# Patient Record
Sex: MALE | Race: WHITE | HISPANIC OR LATINO | Employment: OTHER | URBAN - METROPOLITAN AREA
[De-identification: names, ages, dates, MRNs, and addresses within clinical notes are randomized per-mention and may not be internally consistent; named-entity substitution may affect disease eponyms.]

---

## 2017-02-08 ENCOUNTER — OFFICE VISIT (OUTPATIENT)
Dept: FAMILY MEDICINE | Facility: CLINIC | Age: 77
End: 2017-02-08
Payer: MEDICARE

## 2017-02-08 ENCOUNTER — HOSPITAL ENCOUNTER (OUTPATIENT)
Dept: RADIOLOGY | Facility: HOSPITAL | Age: 77
Discharge: HOME | End: 2017-02-08
Attending: FAMILY MEDICINE
Payer: MEDICARE

## 2017-02-08 VITALS
WEIGHT: 205.69 LBS | OXYGEN SATURATION: 97 % | TEMPERATURE: 99 F | SYSTOLIC BLOOD PRESSURE: 120 MMHG | DIASTOLIC BLOOD PRESSURE: 78 MMHG | HEIGHT: 72 IN | HEART RATE: 78 BPM | BODY MASS INDEX: 27.86 KG/M2

## 2017-02-08 DIAGNOSIS — L97.912 LEG ULCER, RIGHT, WITH FAT LAYER EXPOSED: ICD-10-CM

## 2017-02-08 DIAGNOSIS — I87.8 VENOUS STASIS: ICD-10-CM

## 2017-02-08 DIAGNOSIS — I87.8 VENOUS STASIS: Primary | ICD-10-CM

## 2017-02-08 DIAGNOSIS — H40.9 GLAUCOMA, UNSPECIFIED GLAUCOMA, UNSPECIFIED LATERALITY: ICD-10-CM

## 2017-02-08 DIAGNOSIS — I10 ESSENTIAL HYPERTENSION: ICD-10-CM

## 2017-02-08 DIAGNOSIS — E78.5 HYPERLIPIDEMIA, UNSPECIFIED HYPERLIPIDEMIA TYPE: ICD-10-CM

## 2017-02-08 PROCEDURE — 93971 EXTREMITY STUDY: CPT | Mod: TC,PO

## 2017-02-08 PROCEDURE — 1157F ADVNC CARE PLAN IN RCRD: CPT | Mod: S$GLB,,, | Performed by: FAMILY MEDICINE

## 2017-02-08 PROCEDURE — 1125F AMNT PAIN NOTED PAIN PRSNT: CPT | Mod: S$GLB,,, | Performed by: FAMILY MEDICINE

## 2017-02-08 PROCEDURE — 1159F MED LIST DOCD IN RCRD: CPT | Mod: S$GLB,,, | Performed by: FAMILY MEDICINE

## 2017-02-08 PROCEDURE — 93926 LOWER EXTREMITY STUDY: CPT | Mod: TC,PO

## 2017-02-08 PROCEDURE — 99214 OFFICE O/P EST MOD 30 MIN: CPT | Mod: S$GLB,,, | Performed by: FAMILY MEDICINE

## 2017-02-08 PROCEDURE — 99499 UNLISTED E&M SERVICE: CPT | Mod: S$GLB,,, | Performed by: FAMILY MEDICINE

## 2017-02-08 PROCEDURE — 1160F RVW MEDS BY RX/DR IN RCRD: CPT | Mod: S$GLB,,, | Performed by: FAMILY MEDICINE

## 2017-02-08 PROCEDURE — 3074F SYST BP LT 130 MM HG: CPT | Mod: S$GLB,,, | Performed by: FAMILY MEDICINE

## 2017-02-08 PROCEDURE — 3078F DIAST BP <80 MM HG: CPT | Mod: S$GLB,,, | Performed by: FAMILY MEDICINE

## 2017-02-08 RX ORDER — SULFAMETHOXAZOLE AND TRIMETHOPRIM 800; 160 MG/1; MG/1
1 TABLET ORAL 2 TIMES DAILY
Qty: 20 TABLET | Refills: 0 | Status: SHIPPED | OUTPATIENT
Start: 2017-02-08 | End: 2017-02-18

## 2017-02-08 RX ORDER — MUPIROCIN 20 MG/G
OINTMENT TOPICAL DAILY
Qty: 30 G | Refills: 1 | Status: SHIPPED | OUTPATIENT
Start: 2017-02-08 | End: 2018-10-15 | Stop reason: SDUPTHER

## 2017-02-08 RX ORDER — FUROSEMIDE 40 MG/1
40 TABLET ORAL DAILY
Qty: 30 TABLET | Refills: 0 | Status: SHIPPED | OUTPATIENT
Start: 2017-02-08 | End: 2017-03-14 | Stop reason: ALTCHOICE

## 2017-02-08 NOTE — MR AVS SNAPSHOT
[place] [address] [place] [address]  Phone: [phone]  Fax: [phone]                  Osorio Boggs   2017 11:00 AM   Office Visit    Description:  Male : 1940   Provider:  Keaton Adan MD   Department:  [facility]           Reason for Visit     Follow-up     Wound Check           Diagnoses this Visit        Comments    Venous stasis    -  Primary     Leg ulcer, right, with fat layer exposed         Glaucoma, unspecified glaucoma, unspecified laterality         Essential hypertension         Hyperlipidemia, unspecified hyperlipidemia type                To Do List           Goals (5 Years of Data)     None      Follow-Up and Disposition     Return in about 1 week (around 2/15/2017).       These Medications        Disp Refills Start End    sulfamethoxazole-trimethoprim 800-160mg (BACTRIM DS) 800-160 mg Tab 20 tablet 0 2017    Take 1 tablet by mouth 2 (two) times daily. - Oral    Pharmacy: [facility] [address] [place] [address] Ph #: [phone]       furosemide (LASIX) 40 MG tablet 30 tablet 0 2017    Take 1 tablet (40 mg total) by mouth once daily. - Oral    Pharmacy: [address] Ph #: [phone]       mupirocin (BACTROBAN) 2 % ointment 30 g 1 2017    Apply topically once daily. - Topical (Top)    Pharmacy: [address] Ph #: [phone]         [facility]     [facility] On Call Nurse Care Line -  Assistance  Registered nurses in the [facility] On Call Center provide clinical advisement, health education, appointment booking, and other advisory services.  Call for this free service at [phone].             Medications           Message regarding Medications     Verify the changes and/or additions to your medication regime listed below are the same as discussed with your clinician  today.  If any of these changes or additions are incorrect, please notify your healthcare provider.        START taking these NEW medications        Refills    sulfamethoxazole-trimethoprim 800-160mg (BACTRIM DS) 800-160 mg Tab 0    Sig: Take 1 tablet by mouth 2 (two) times daily.    Class: Normal    Route: Oral    furosemide (LASIX) 40 MG tablet 0    Sig: Take 1 tablet (40 mg total) by mouth once daily.    Class: Normal    Route: Oral    mupirocin (BACTROBAN) 2 % ointment 1    Sig: Apply topically once daily.    Class: Normal    Route: Topical (Top)      STOP taking these medications     cyanocobalamin, vitamin B-12, (VITAMIN B-12) 1,000 mcg/mL Drop Take 1 mL by mouth once daily.           Verify that the below list of medications is an accurate representation of the medications you are currently taking.  If none reported, the list may be blank. If incorrect, please contact your healthcare provider. Carry this list with you in case of emergency.           Current Medications     abacavir-lamivudine (EPZICOM) 600-300 mg per tablet Take 1 tablet by mouth once daily.    aspirin (ECOTRIN) 81 MG EC tablet Take 81 mg by mouth once daily.    brimonidine 0.1% (ALPHAGAN P) 0.1 % Drop Place 1 drop into both eyes 3 (three) times daily.    CHOLECALCIFEROL, VITAMIN D3, (VITAMIN D3 ORAL) Take 1 tablet by mouth once daily.     efavirenz (SUSTIVA) 600 mg Tab Take 600 mg by mouth every evening.    EVOTAZ 300-150 mg Tab Take 1 tablet by mouth once daily.    ezetimibe (ZETIA) 10 mg tablet Take 1 tablet (10 mg total) by mouth once daily. For cholesterol    fish oil-omega-3 fatty acids 300-1,000 mg capsule Take 2 g by mouth once daily.    latanoprost 0.005 % ophthalmic solution 1 drop every evening.    olmesartan (BENICAR) 40 MG tablet Take 40 mg by mouth once daily.    fenofibrate (TRICOR) 145 MG tablet Take 1 tablet by mouth once daily.    furosemide (LASIX) 40 MG tablet Take 1 tablet (40 mg total) by mouth once daily.    mupirocin  (BACTROBAN) 2 % ointment Apply topically once daily.    sulfamethoxazole-trimethoprim 800-160mg (BACTRIM DS) 800-160 mg Tab Take 1 tablet by mouth 2 (two) times daily.           Clinical Reference Information           Your Vitals Were     BP Pulse Temp Height Weight SpO2    120/78 78 98.5 °F (36.9 °C) (Oral) 6' (1.829 m) 93.3 kg (205 lb 11 oz) 97%    BMI                27.9 kg/m2          Blood Pressure          Most Recent Value    BP  120/78      Allergies as of 2/8/2017     Lipitor [Atorvastatin]      Immunizations Administered on Date of Encounter - 2/8/2017     None      Orders Placed During Today's Visit     Future Labs/Procedures Expected by Expires    US Lower Extremity Veins Right  2/8/2017 2/8/2018    VAS Ultrasound doppler arterial leg right  As directed 2/8/2018      [facility] Sign-Up     Activating your [facility] account is as easy as 1-2-3!     1) Visit [email or web address], select Sign Up Now, enter this activation code and your date of birth, then select Next.  [email or web address]  Expires: 3/25/2017 12:21 PM      2) Create a username and password to use when you visit [facility] in the future and select a security question in case you lose your password and select Next.    3) Enter your e-mail address and click Sign Up!    Additional Information  If you have questions, please e-mail [email or web address] or call [phone] to talk to our [facility] staff. Remember, [facility] is NOT to be used for urgent needs. For medical emergencies, dial 911.         Language Assistance Services     ATTENTION: Language assistance services are available, free of charge. Please call [phone].      ATENCIÓN: Si habla español, tiene a marcus disposición servicios gratuitos de asistencia lingüística. Llame al [phone].     CHÚ Ý: N?u b?n nói Ti?ng Vi?t, có các d?ch v? h? tr? ngôn ng? mi?n phí dành cho b?n. G?i s? [phone].         [facility] complies with applicable Federal civil rights laws and  does not discriminate on the basis of race, color, national origin, age, disability, or sex.
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
## 2017-02-09 ENCOUNTER — TELEPHONE (OUTPATIENT)
Dept: FAMILY MEDICINE | Facility: CLINIC | Age: 77
End: 2017-02-09

## 2017-02-09 DIAGNOSIS — E78.5 HYPERLIPIDEMIA, UNSPECIFIED HYPERLIPIDEMIA TYPE: ICD-10-CM

## 2017-02-09 DIAGNOSIS — H40.9 GLAUCOMA, UNSPECIFIED GLAUCOMA, UNSPECIFIED LATERALITY: ICD-10-CM

## 2017-02-09 DIAGNOSIS — I87.8 VENOUS STASIS: ICD-10-CM

## 2017-02-09 DIAGNOSIS — Z21 HIV POSITIVE: ICD-10-CM

## 2017-02-09 DIAGNOSIS — L97.912 ULCER OF RIGHT LOWER EXTREMITY WITH FAT LAYER EXPOSED: ICD-10-CM

## 2017-02-09 DIAGNOSIS — I10 ESSENTIAL HYPERTENSION: Primary | ICD-10-CM

## 2017-02-09 DIAGNOSIS — I73.9 PAD (PERIPHERAL ARTERY DISEASE): ICD-10-CM

## 2017-02-09 NOTE — TELEPHONE ENCOUNTER
Left detailed message for patient to return my call to discuss ultrasound results and schedule with cardiology.

## 2017-02-09 NOTE — TELEPHONE ENCOUNTER
----- Message from Keaton Adan MD sent at 2/9/2017  6:40 AM CST -----  Some blockage in right leg arteries.  Referring to cardiology, Live for further evaluation. Help pt get appt.

## 2017-02-15 ENCOUNTER — OFFICE VISIT (OUTPATIENT)
Dept: FAMILY MEDICINE | Facility: CLINIC | Age: 77
End: 2017-02-15
Payer: MEDICARE

## 2017-02-15 VITALS
WEIGHT: 202.19 LBS | TEMPERATURE: 98 F | HEIGHT: 72 IN | HEART RATE: 83 BPM | OXYGEN SATURATION: 98 % | SYSTOLIC BLOOD PRESSURE: 126 MMHG | BODY MASS INDEX: 27.39 KG/M2 | DIASTOLIC BLOOD PRESSURE: 84 MMHG

## 2017-02-15 DIAGNOSIS — I10 ESSENTIAL HYPERTENSION: ICD-10-CM

## 2017-02-15 DIAGNOSIS — H40.9 GLAUCOMA, UNSPECIFIED GLAUCOMA, UNSPECIFIED LATERALITY: ICD-10-CM

## 2017-02-15 DIAGNOSIS — I73.9 PAD (PERIPHERAL ARTERY DISEASE): ICD-10-CM

## 2017-02-15 DIAGNOSIS — E78.5 HYPERLIPIDEMIA, UNSPECIFIED HYPERLIPIDEMIA TYPE: ICD-10-CM

## 2017-02-15 DIAGNOSIS — Z21 HIV POSITIVE: ICD-10-CM

## 2017-02-15 DIAGNOSIS — L97.912 ULCER OF RIGHT LOWER EXTREMITY WITH FAT LAYER EXPOSED: Primary | ICD-10-CM

## 2017-02-15 DIAGNOSIS — I87.8 VENOUS STASIS: ICD-10-CM

## 2017-02-15 PROCEDURE — 1159F MED LIST DOCD IN RCRD: CPT | Mod: S$GLB,,, | Performed by: FAMILY MEDICINE

## 2017-02-15 PROCEDURE — 1126F AMNT PAIN NOTED NONE PRSNT: CPT | Mod: S$GLB,,, | Performed by: FAMILY MEDICINE

## 2017-02-15 PROCEDURE — 3079F DIAST BP 80-89 MM HG: CPT | Mod: S$GLB,,, | Performed by: FAMILY MEDICINE

## 2017-02-15 PROCEDURE — 1160F RVW MEDS BY RX/DR IN RCRD: CPT | Mod: S$GLB,,, | Performed by: FAMILY MEDICINE

## 2017-02-15 PROCEDURE — 1157F ADVNC CARE PLAN IN RCRD: CPT | Mod: S$GLB,,, | Performed by: FAMILY MEDICINE

## 2017-02-15 PROCEDURE — 99499 UNLISTED E&M SERVICE: CPT | Mod: S$GLB,,, | Performed by: FAMILY MEDICINE

## 2017-02-15 PROCEDURE — 99213 OFFICE O/P EST LOW 20 MIN: CPT | Mod: S$GLB,,, | Performed by: FAMILY MEDICINE

## 2017-02-15 PROCEDURE — 3074F SYST BP LT 130 MM HG: CPT | Mod: S$GLB,,, | Performed by: FAMILY MEDICINE

## 2017-02-15 NOTE — PROGRESS NOTES
Subjective:      Patient ID: Osorio Boggs is a 76 y.o. male.    Chief Complaint: Follow-up (1 week f/u)    HPI Comments: Right leg infection follow up; no pain; doing better; less swelling; taking rx;     Review of Systems   Constitutional: Negative for appetite change, fatigue, fever and unexpected weight change.   HENT: Negative for congestion, ear pain, sinus pressure and sore throat.    Eyes: Negative for pain and visual disturbance.   Respiratory: Negative for shortness of breath.    Cardiovascular: Negative for chest pain.   Gastrointestinal: Negative for abdominal pain, constipation and diarrhea.   Endocrine: Negative for polyuria.   Genitourinary: Negative for difficulty urinating and frequency.   Musculoskeletal: Negative for arthralgias, back pain and myalgias.   Skin: Positive for wound. Negative for color change.   Allergic/Immunologic: Negative.    Neurological: Negative for syncope, weakness and headaches.   Hematological: Does not bruise/bleed easily.   Psychiatric/Behavioral: Negative for dysphoric mood and suicidal ideas. The patient is not nervous/anxious.    All other systems reviewed and are negative.    Objective:     Physical Exam   Constitutional: He is oriented to person, place, and time. He appears well-developed and well-nourished. No distress.   HENT:   Head: Normocephalic and atraumatic.   Right Ear: External ear normal.   Left Ear: External ear normal.   Mouth/Throat: Oropharynx is clear and moist. No oropharyngeal exudate.   Eyes: Conjunctivae and EOM are normal. Pupils are equal, round, and reactive to light. Right eye exhibits no discharge. Left eye exhibits no discharge. No scleral icterus.   Neck: Normal range of motion. Neck supple. No JVD present. No tracheal deviation present. No thyromegaly present.   Cardiovascular: Normal rate and regular rhythm.  Exam reveals no gallop and no friction rub.    No murmur heard.  Pulmonary/Chest: Effort normal and breath sounds normal. No  stridor. No respiratory distress. He has no wheezes. He has no rales. He exhibits no tenderness.   Abdominal: Soft. He exhibits no distension and no mass. There is no tenderness. There is no rebound and no guarding.   Musculoskeletal: Normal range of motion. He exhibits no edema or tenderness.   Lymphadenopathy:     He has no cervical adenopathy.   Neurological: He is alert and oriented to person, place, and time. He has normal reflexes. He displays normal reflexes. No cranial nerve deficit. He exhibits normal muscle tone. Coordination normal.   Skin: Skin is warm and dry. No rash noted. He is not diaphoretic. No erythema. No pallor.   Right lower leg infected ulcer looking much better   Psychiatric: He has a normal mood and affect. His behavior is normal. Judgment and thought content normal.   Nursing note and vitals reviewed.    Assessment:     1. Ulcer of right lower extremity with fat layer exposed    2. Essential hypertension    3. PAD (peripheral artery disease)    4. Venous stasis    5. Hyperlipidemia, unspecified hyperlipidemia type    6. Glaucoma, unspecified glaucoma, unspecified laterality    7. HIV positive      Plan:     New Prescriptions    No medications on file     Discontinued Medications    No medications on file     Modified Medications    No medications on file       Ulcer of right lower extremity with fat layer exposed    Essential hypertension    PAD (peripheral artery disease)    Venous stasis    Hyperlipidemia, unspecified hyperlipidemia type    Glaucoma, unspecified glaucoma, unspecified laterality    HIV positive    Cont wound treatment the same; finish abx; referred to cardiology for pad found on ultrasound

## 2017-02-15 NOTE — MR AVS SNAPSHOT
[address] [address]  Phone: [phone]  Fax: [phone]                  Osorio Boggs   2/15/2017 11:20 AM   Office Visit    Description:  Male : 1940   Provider:  Keaton Adan MD   Department:  [facility]           Reason for Visit     Follow-up           Diagnoses this Visit        Comments    Ulcer of right lower extremity with fat layer exposed    -  Primary     Essential hypertension         PAD (peripheral artery disease)         Venous stasis         Hyperlipidemia, unspecified hyperlipidemia type         Glaucoma, unspecified glaucoma, unspecified laterality         HIV positive                To Do List           Future Appointments        Provider Department Dept Phone    2017 8:00 AM Gene Ramos MD [facility] Cardiology [phone]    3/1/2017 11:00 AM Keaton Adan MD [facility] [phone]      Goals (5 Years of Data)     None      Follow-Up and Disposition     Return in about 2 weeks (around 3/1/2017).      [facility] On Call     [facility] On Call Nurse [facility] Line - 24/7 Assistance  Registered nurses in the [facility] On Call Center provide clinical advisement, health education, appointment booking, and other advisory services.  Call for this free service at [phone].             Medications           Message regarding Medications     Verify the changes and/or additions to your medication regime listed below are the same as discussed with your clinician today.  If any of these changes or additions are incorrect, please notify your healthcare provider.             Verify that the below list of medications is an accurate representation of the medications you are currently taking.  If none reported, the list may be blank. If incorrect, please contact your healthcare provider. Carry this list with you in case of emergency.           Current Medications     abacavir-lamivudine (EPZICOM) 600-300 mg per  tablet Take 1 tablet by mouth once daily.    aspirin (ECOTRIN) 81 MG EC tablet Take 81 mg by mouth once daily.    brimonidine 0.1% (ALPHAGAN P) 0.1 % Drop Place 1 drop into both eyes 2 (two) times daily.     CHOLECALCIFEROL, VITAMIN D3, (VITAMIN D3 ORAL) Take 50,000 Units by mouth once a week.     efavirenz (SUSTIVA) 600 mg Tab Take 600 mg by mouth every evening.    EVOTAZ 300-150 mg Tab Take 1 tablet by mouth once daily.    ezetimibe (ZETIA) 10 mg tablet Take 1 tablet (10 mg total) by mouth once daily. For cholesterol    fenofibrate (TRICOR) 145 MG tablet Take 1 tablet by mouth once daily.    fish oil-omega-3 fatty acids 300-1,000 mg capsule Take 2 g by mouth 2 (two) times daily.     furosemide (LASIX) 40 MG tablet Take 1 tablet (40 mg total) by mouth once daily.    latanoprost 0.005 % ophthalmic solution 1 drop every evening.    mupirocin (BACTROBAN) 2 % ointment Apply topically once daily.    olmesartan (BENICAR) 40 MG tablet Take 40 mg by mouth once daily.    sulfamethoxazole-trimethoprim 800-160mg (BACTRIM DS) 800-160 mg Tab Take 1 tablet by mouth 2 (two) times daily.           Clinical Reference Information           Your Vitals Were     BP Pulse Temp Height Weight SpO2    126/84 83 98.4 °F (36.9 °C) (Oral) 6' (1.829 m) 91.7 kg (202 lb 2.6 oz) 98%    BMI                27.42 kg/m2          Blood Pressure          Most Recent Value    BP  126/84      Allergies as of 2/15/2017     Lipitor [Atorvastatin]      Immunizations Administered on Date of Encounter - 2/15/2017     None      [facility] Sign-Up     Activating your [facility] account is as easy as 1-2-3!     1) Visit [email or web address], select Sign Up Now, enter this activation code and your date of birth, then select Next.  [email or web address]  Expires: 3/25/2017 12:21 PM      2) Create a username and password to use when you visit [facility] in the future and select a security question in case you lose your password and select Next.    3) Enter your e-mail address  and click Sign Up!    Additional Information  If you have questions, please e-mail [email or web address] or call [phone] to talk to our [facility] staff. Remember, [facility] is NOT to be used for urgent needs. For medical emergencies, dial 911.         Language Assistance Services     ATTENTION: Language assistance services are available, free of charge. Please call [phone].      ATENCIÓN: Si habla yandel, tiene a marcus disposición servicios gratuitos de asistencia lingüística. Llame al [phone].     CHÚ Ý: N?u b?n nói Ti?ng Vi?t, có các d?ch v? h? tr? ngôn ng? mi?n phí dành cho b?n. G?i s? [phone].         [facility] complies with applicable Federal civil rights laws and does not discriminate on the basis of race, color, national origin, age, disability, or sex.

## 2017-02-21 ENCOUNTER — OFFICE VISIT (OUTPATIENT)
Dept: CARDIOLOGY | Facility: CLINIC | Age: 77
End: 2017-02-21
Payer: MEDICARE

## 2017-02-21 VITALS
HEIGHT: 72 IN | WEIGHT: 196 LBS | HEART RATE: 65 BPM | DIASTOLIC BLOOD PRESSURE: 57 MMHG | OXYGEN SATURATION: 99 % | SYSTOLIC BLOOD PRESSURE: 91 MMHG | BODY MASS INDEX: 26.55 KG/M2

## 2017-02-21 DIAGNOSIS — L97.912 ULCER OF RIGHT LOWER EXTREMITY WITH FAT LAYER EXPOSED: Primary | ICD-10-CM

## 2017-02-21 DIAGNOSIS — I87.8 VENOUS STASIS: ICD-10-CM

## 2017-02-21 DIAGNOSIS — I73.9 PAD (PERIPHERAL ARTERY DISEASE): ICD-10-CM

## 2017-02-21 PROCEDURE — 99999 PR PBB SHADOW E&M-EST. PATIENT-LVL III: CPT | Mod: PBBFAC,,, | Performed by: INTERNAL MEDICINE

## 2017-02-21 PROCEDURE — 1159F MED LIST DOCD IN RCRD: CPT | Mod: S$GLB,,, | Performed by: INTERNAL MEDICINE

## 2017-02-21 PROCEDURE — 3078F DIAST BP <80 MM HG: CPT | Mod: S$GLB,,, | Performed by: INTERNAL MEDICINE

## 2017-02-21 PROCEDURE — 99203 OFFICE O/P NEW LOW 30 MIN: CPT | Mod: S$GLB,,, | Performed by: INTERNAL MEDICINE

## 2017-02-21 PROCEDURE — 99499 UNLISTED E&M SERVICE: CPT | Mod: S$PBB,,, | Performed by: INTERNAL MEDICINE

## 2017-02-21 PROCEDURE — 1126F AMNT PAIN NOTED NONE PRSNT: CPT | Mod: S$GLB,,, | Performed by: INTERNAL MEDICINE

## 2017-02-21 PROCEDURE — 1157F ADVNC CARE PLAN IN RCRD: CPT | Mod: S$GLB,,, | Performed by: INTERNAL MEDICINE

## 2017-02-21 PROCEDURE — 3074F SYST BP LT 130 MM HG: CPT | Mod: S$GLB,,, | Performed by: INTERNAL MEDICINE

## 2017-02-21 NOTE — LETTER
February 21, 2017      Keaton Adan MD  [address] [facility] [address]           [facility] - Cardiology  [address], [address]  [facility] [address]  Phone: [phone]  Fax: [phone]          Patient: Osorio Boggs   MR Number: [id number]   YOB: 1940   Date of Visit: 2/21/2017       Dear Dr. Keaton Adan:    Thank you for referring Osorio Boggs to me for evaluation. Attached you will find relevant portions of my assessment and plan of care.    If you have questions, please do not hesitate to call me. I look forward to following Osorio Boggs along with you.    Sincerely,    Gene Ramos MD    Enclosure  CC:  No Recipients    If you would like to receive this communication electronically, please contact [email or web address] or [phone] to request more information on [employer] Link access.    For providers and/or their staff who would like to refer a patient to [facility], please contact us through our one-stop-shop provider referral line, [facility], at [phone].    If you feel you have received this communication in error or would no longer like to receive these types of communications, please e-mail [email or web address]
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
## 2017-02-22 ENCOUNTER — TELEPHONE (OUTPATIENT)
Dept: CARDIOLOGY | Facility: CLINIC | Age: 77
End: 2017-02-22

## 2017-02-22 NOTE — TELEPHONE ENCOUNTER
----- Message from Catherine Chang sent at 2/22/2017 11:34 AM CST -----  Contact: self/[phone]  He is calling to see if his fu appointments have been scheduled.

## 2017-02-22 NOTE — TELEPHONE ENCOUNTER
Evaluated on 2/21/17, recommendations to consult wound care and get ultrasound.  Informed patient I am still working on correlating both appointments as he requested.  Will call back towards the end of the business day.  Voiced understanding.

## 2017-02-23 NOTE — TELEPHONE ENCOUNTER
Spoke with patient on yesterday, appointments scheduled accordingly.  Patient asked if he should see Dr Adan now since he will be evaluated by wound care.  Spoke with Dr Adan, it was recommended to follow up with wound care and complete work up, then follow up with Dr Adan.  Patient informed, voiced understanding.

## 2017-03-02 ENCOUNTER — HOSPITAL ENCOUNTER (OUTPATIENT)
Dept: WOUND CARE | Facility: HOSPITAL | Age: 77
Discharge: HOME | End: 2017-03-02
Attending: INTERNAL MEDICINE
Payer: MEDICARE

## 2017-03-02 VITALS
SYSTOLIC BLOOD PRESSURE: 121 MMHG | HEIGHT: 72 IN | BODY MASS INDEX: 27.77 KG/M2 | TEMPERATURE: 98 F | WEIGHT: 205 LBS | HEART RATE: 56 BPM | DIASTOLIC BLOOD PRESSURE: 59 MMHG

## 2017-03-02 DIAGNOSIS — L97.912 ULCER OF RIGHT LOWER EXTREMITY WITH FAT LAYER EXPOSED: Primary | ICD-10-CM

## 2017-03-02 PROCEDURE — 99214 OFFICE O/P EST MOD 30 MIN: CPT

## 2017-03-02 NOTE — PROGRESS NOTES
Much of the documentation for this visit was completed in the [facility] system. Please see the attached documentation for further details about this patient's care.     Antonia Jackson RN

## 2017-03-02 NOTE — PROGRESS NOTES
Much of the documentation for this visit was completed in the [facility] system. Please see the attached documentation for further details about this patient's care.     Oz Rodriguez MD

## 2017-03-07 ENCOUNTER — TELEPHONE (OUTPATIENT)
Dept: FAMILY MEDICINE | Facility: CLINIC | Age: 77
End: 2017-03-07

## 2017-03-07 DIAGNOSIS — R79.89 AZOTEMIA: Primary | ICD-10-CM

## 2017-03-07 NOTE — TELEPHONE ENCOUNTER
---- Message from Lynn Cross sent at 3/3/2017  9:58 AM CST -----  Contact: Janet Robins-nurse practitioner ph# [phone]  She has been monitoring patient's HIV. Lab results show elevated kidney function.   Creatinine 2.25 & GFR 31;   Complete lab results faxed over ( scanned into media)      She has advised pt to hold his Lasix until he hears from Dr Adan.

## 2017-03-08 ENCOUNTER — HOSPITAL ENCOUNTER (OUTPATIENT)
Dept: WOUND CARE | Facility: HOSPITAL | Age: 77
Discharge: HOME | End: 2017-03-08
Attending: SURGERY
Payer: MEDICARE

## 2017-03-08 VITALS
BODY MASS INDEX: 27.77 KG/M2 | HEIGHT: 72 IN | SYSTOLIC BLOOD PRESSURE: 147 MMHG | TEMPERATURE: 98 F | HEART RATE: 60 BPM | DIASTOLIC BLOOD PRESSURE: 67 MMHG | WEIGHT: 205 LBS

## 2017-03-08 PROCEDURE — 99213 OFFICE O/P EST LOW 20 MIN: CPT

## 2017-03-10 ENCOUNTER — HOSPITAL ENCOUNTER (OUTPATIENT)
Dept: RADIOLOGY | Facility: HOSPITAL | Age: 77
Discharge: HOME | End: 2017-03-10
Attending: INTERNAL MEDICINE
Payer: MEDICARE

## 2017-03-10 DIAGNOSIS — I87.8 VENOUS STASIS: ICD-10-CM

## 2017-03-10 DIAGNOSIS — L97.912 ULCER OF RIGHT LOWER EXTREMITY WITH FAT LAYER EXPOSED: ICD-10-CM

## 2017-03-10 PROCEDURE — 93970 EXTREMITY STUDY: CPT | Mod: TC

## 2017-03-10 PROCEDURE — 93970 EXTREMITY STUDY: CPT | Mod: 26,,, | Performed by: RADIOLOGY

## 2017-03-14 ENCOUNTER — TELEPHONE (OUTPATIENT)
Dept: CARDIOLOGY | Facility: CLINIC | Age: 77
End: 2017-03-14

## 2017-03-14 ENCOUNTER — OFFICE VISIT (OUTPATIENT)
Dept: CARDIOLOGY | Facility: CLINIC | Age: 77
End: 2017-03-14
Payer: MEDICARE

## 2017-03-14 VITALS
HEIGHT: 72 IN | BODY MASS INDEX: 27.89 KG/M2 | HEART RATE: 55 BPM | OXYGEN SATURATION: 99 % | WEIGHT: 205.88 LBS | DIASTOLIC BLOOD PRESSURE: 70 MMHG | SYSTOLIC BLOOD PRESSURE: 150 MMHG

## 2017-03-14 DIAGNOSIS — I10 ESSENTIAL HYPERTENSION: ICD-10-CM

## 2017-03-14 DIAGNOSIS — L97.912 ULCER OF RIGHT LOWER EXTREMITY WITH FAT LAYER EXPOSED: ICD-10-CM

## 2017-03-14 DIAGNOSIS — I87.2 CHRONIC VENOUS INSUFFICIENCY: Primary | Chronic | ICD-10-CM

## 2017-03-14 DIAGNOSIS — E78.5 HYPERLIPIDEMIA, UNSPECIFIED HYPERLIPIDEMIA TYPE: ICD-10-CM

## 2017-03-14 PROCEDURE — 1157F ADVNC CARE PLAN IN RCRD: CPT | Mod: S$GLB,,, | Performed by: INTERNAL MEDICINE

## 2017-03-14 PROCEDURE — 1159F MED LIST DOCD IN RCRD: CPT | Mod: S$GLB,,, | Performed by: INTERNAL MEDICINE

## 2017-03-14 PROCEDURE — 3077F SYST BP >= 140 MM HG: CPT | Mod: S$GLB,,, | Performed by: INTERNAL MEDICINE

## 2017-03-14 PROCEDURE — 1126F AMNT PAIN NOTED NONE PRSNT: CPT | Mod: S$GLB,,, | Performed by: INTERNAL MEDICINE

## 2017-03-14 PROCEDURE — 99999 PR PBB SHADOW E&M-EST. PATIENT-LVL III: CPT | Mod: PBBFAC,,, | Performed by: INTERNAL MEDICINE

## 2017-03-14 PROCEDURE — 3078F DIAST BP <80 MM HG: CPT | Mod: S$GLB,,, | Performed by: INTERNAL MEDICINE

## 2017-03-14 PROCEDURE — 99213 OFFICE O/P EST LOW 20 MIN: CPT | Mod: S$GLB,,, | Performed by: INTERNAL MEDICINE

## 2017-03-14 PROCEDURE — 99499 UNLISTED E&M SERVICE: CPT | Mod: S$PBB,,, | Performed by: INTERNAL MEDICINE

## 2017-03-14 PROCEDURE — 1160F RVW MEDS BY RX/DR IN RCRD: CPT | Mod: S$GLB,,, | Performed by: INTERNAL MEDICINE

## 2017-03-14 RX ORDER — ATAZANAVIR 300 MG/1
1 CAPSULE, GELATIN COATED ORAL DAILY
COMMUNITY
Start: 2017-03-09 | End: 2017-03-14 | Stop reason: ALTCHOICE

## 2017-03-14 RX ORDER — RITONAVIR 100 MG/1
1 TABLET, FILM COATED ORAL DAILY
COMMUNITY
Start: 2017-03-09 | End: 2017-03-14 | Stop reason: ALTCHOICE

## 2017-03-14 NOTE — TELEPHONE ENCOUNTER
----- Message from Dennys Nicole sent at 3/14/2017  9:32 AM CDT -----  Contact: Smiley dugan/[facility]   [phone]  She would like to speak with you concerning a prescription for compression stockings.  Please advise
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
## 2017-03-14 NOTE — TELEPHONE ENCOUNTER
Spoke with Smiley, patient asked her to call office to see if numbers on orders I gave him was what [facility] should order.  Smiley stated she explained to patient, its the pressure amount that is of concern, number on order form is from our system, but [facility] has to order by pressure amount, any .  Smiley will reiterate to patient.  Informed her he is welcome to call office if further clarification is needed.

## 2017-03-14 NOTE — MR AVS SNAPSHOT
[facility] - Cardiology  [address] [place], [place] [address], [address]  [place] [address]  Phone: [phone]  Fax: [phone]                  Osorio Boggs   3/14/2017 8:20 AM   Office Visit    Description:  Male : 1940   Provider:  Gene Ramos MD   Department:  [facility] - Cardiology           Reason for Visit     Results           Diagnoses this Visit        Comments    Chronic venous insufficiency    -  Primary     Essential hypertension         Ulcer of right lower extremity with fat layer exposed         Hyperlipidemia, unspecified hyperlipidemia type                To Do List           Future Appointments        Provider Department Dept Phone    3/20/2017 8:00 AM Keaton Adan MD [facility] [phone]    3/22/2017 8:30 AM Oz Rodriguez MD [facility] [phone]    2017 8:00 AM Gene Ramos MD [facility] [phone]      Goals (5 Years of Data)     None      [facility]     [facility] Care [facility] - 24/7 Assistance  Registered nurses in the [facility] provide clinical advisement, health education, appointment booking, and other advisory services.  Call for this free service at [phone].             Medications           Message regarding Medications     Verify the changes and/or additions to your medication regime listed below are the same as discussed with your clinician today.  If any of these changes or additions are incorrect, please notify your healthcare provider.        STOP taking these medications     furosemide (LASIX) 40 MG tablet Take 1 tablet (40 mg total) by mouth once daily.    NORVIR 100 mg Tab tablet Take 1 tablet by mouth once daily.    REYATAZ 300 mg Cap Take 1 capsule by mouth once daily.           Verify that the below list of medications is an accurate representation of the medications you are currently taking.  If none reported, the list may be blank. If incorrect, please contact your  healthcare provider. Carry this list with you in case of emergency.           Current Medications     abacavir-lamivudine (EPZICOM) 600-300 mg per tablet Take 1 tablet by mouth once daily.    aspirin (ECOTRIN) 81 MG EC tablet Take 81 mg by mouth once daily.    brimonidine 0.1% (ALPHAGAN P) 0.1 % Drop Place 1 drop into both eyes 2 (two) times daily.     CHOLECALCIFEROL, VITAMIN D3, (VITAMIN D3 ORAL) Take 50,000 Units by mouth once a week.     efavirenz (SUSTIVA) 600 mg Tab Take 600 mg by mouth every evening.    EVOTAZ 300-150 mg Tab Take 1 tablet by mouth once daily.    ezetimibe (ZETIA) 10 mg tablet Take 1 tablet (10 mg total) by mouth once daily. For cholesterol    fenofibrate (TRICOR) 145 MG tablet Take 1 tablet by mouth once daily.    fish oil-omega-3 fatty acids 300-1,000 mg capsule Take 2 g by mouth 2 (two) times daily.     latanoprost 0.005 % ophthalmic solution 1 drop every evening.    olmesartan (BENICAR) 40 MG tablet Take 40 mg by mouth once daily.           Clinical Reference Information           Your Vitals Were     BP Pulse Height Weight SpO2 BMI    150/70 55 6' (1.829 m) 93.4 kg (205 lb 14.4 oz) 99% 27.93 kg/m2      Blood Pressure          Most Recent Value    Right Arm BP - Sitting  150/70    Left Arm BP - Sitting  153/63    BP  (!)  150/70      Allergies as of 3/14/2017     Lipitor [Atorvastatin]      Immunizations Administered on Date of Encounter - 3/14/2017     None      Orders Placed During Today's Visit      Normal Orders This Visit    COMPRESSION STOCKINGS       [employer] Sign-Up     Activating your [facility] account is as easy as 1-2-3!     1) Visit [email or web address], select Sign Up Now, enter this activation code and your date of birth, then select Next.  [email or web address]  Expires: 3/25/2017  1:21 PM      2) Create a username and password to use when you visit [facility] in the future and select a security question in case you lose your password and select Next.    3) Enter your e-mail address  and click Sign Up!    Additional Information  If you have questions, please e-mail [email or web address] or call [phone] to talk to our [facility] staff. Remember, [facility] is NOT to be used for urgent needs. For medical emergencies, dial 911.         Language Assistance Services     ATTENTION: Language assistance services are available, free of charge. Please call [phone].      ATENCIÓN: Si habla yandel, tiene a marcus disposición servicios gratuitos de asistencia lingüística. Llame al [phone].     CHÚ Ý: N?u b?n nói Ti?ng Vi?t, có các d?ch v? h? tr? ngôn ng? mi?n phí dành cho b?n. G?i s? [phone].         [facility] complies with applicable Federal civil rights laws and does not discriminate on the basis of race, color, national origin, age, disability, or sex.

## 2017-03-15 ENCOUNTER — HOSPITAL ENCOUNTER (OUTPATIENT)
Dept: CARDIOLOGY | Facility: HOSPITAL | Age: 77
Discharge: HOME | End: 2017-03-15
Payer: MEDICARE

## 2017-03-15 DIAGNOSIS — R60.9 EDEMA: ICD-10-CM

## 2017-03-15 DIAGNOSIS — I10 ESSENTIAL HYPERTENSION, MALIGNANT: ICD-10-CM

## 2017-03-15 DIAGNOSIS — R06.03 ACUTE RESPIRATORY DISTRESS: ICD-10-CM

## 2017-03-20 ENCOUNTER — OFFICE VISIT (OUTPATIENT)
Dept: FAMILY MEDICINE | Facility: CLINIC | Age: 77
End: 2017-03-20
Payer: MEDICARE

## 2017-03-20 VITALS
HEIGHT: 72 IN | WEIGHT: 210.56 LBS | OXYGEN SATURATION: 99 % | TEMPERATURE: 98 F | SYSTOLIC BLOOD PRESSURE: 126 MMHG | DIASTOLIC BLOOD PRESSURE: 70 MMHG | BODY MASS INDEX: 28.52 KG/M2 | HEART RATE: 67 BPM

## 2017-03-20 DIAGNOSIS — R07.89 CHEST DISCOMFORT: Primary | ICD-10-CM

## 2017-03-20 DIAGNOSIS — I10 ESSENTIAL HYPERTENSION: ICD-10-CM

## 2017-03-20 DIAGNOSIS — E78.5 HYPERLIPIDEMIA, UNSPECIFIED HYPERLIPIDEMIA TYPE: ICD-10-CM

## 2017-03-20 DIAGNOSIS — I87.2 CHRONIC VENOUS INSUFFICIENCY: Chronic | ICD-10-CM

## 2017-03-20 DIAGNOSIS — I73.9 PAD (PERIPHERAL ARTERY DISEASE): ICD-10-CM

## 2017-03-20 DIAGNOSIS — L97.912 ULCER OF RIGHT LOWER EXTREMITY WITH FAT LAYER EXPOSED: ICD-10-CM

## 2017-03-20 DIAGNOSIS — I87.8 VENOUS STASIS: ICD-10-CM

## 2017-03-20 DIAGNOSIS — Z21 HIV POSITIVE: ICD-10-CM

## 2017-03-20 DIAGNOSIS — R79.89 AZOTEMIA: ICD-10-CM

## 2017-03-20 PROCEDURE — 1160F RVW MEDS BY RX/DR IN RCRD: CPT | Mod: S$GLB,,, | Performed by: FAMILY MEDICINE

## 2017-03-20 PROCEDURE — 1126F AMNT PAIN NOTED NONE PRSNT: CPT | Mod: S$GLB,,, | Performed by: FAMILY MEDICINE

## 2017-03-20 PROCEDURE — 1157F ADVNC CARE PLAN IN RCRD: CPT | Mod: S$GLB,,, | Performed by: FAMILY MEDICINE

## 2017-03-20 PROCEDURE — 3074F SYST BP LT 130 MM HG: CPT | Mod: S$GLB,,, | Performed by: FAMILY MEDICINE

## 2017-03-20 PROCEDURE — 3078F DIAST BP <80 MM HG: CPT | Mod: S$GLB,,, | Performed by: FAMILY MEDICINE

## 2017-03-20 PROCEDURE — 1159F MED LIST DOCD IN RCRD: CPT | Mod: S$GLB,,, | Performed by: FAMILY MEDICINE

## 2017-03-20 PROCEDURE — 99214 OFFICE O/P EST MOD 30 MIN: CPT | Mod: S$GLB,,, | Performed by: FAMILY MEDICINE

## 2017-03-20 PROCEDURE — 99499 UNLISTED E&M SERVICE: CPT | Mod: S$GLB,,, | Performed by: FAMILY MEDICINE

## 2017-03-20 RX ORDER — ESOMEPRAZOLE MAGNESIUM 40 MG/1
40 CAPSULE, DELAYED RELEASE ORAL
Qty: 30 CAPSULE | Refills: 0 | Status: SHIPPED | OUTPATIENT
Start: 2017-03-20 | End: 2017-06-06

## 2017-03-20 NOTE — MR AVS SNAPSHOT
[facility]  [address] [place] [address]  Phone: [phone]  Fax: [phone]                  Osorio Boggs   3/20/2017 8:00 AM   Office Visit    Description:  Male : 1940   Provider:  Keaton Adan MD   Department:  [facility]           Reason for Visit     Gastroesophageal Reflux           Diagnoses this Visit        Comments    Chest discomfort    -  Primary     Chronic venous insufficiency         Essential hypertension         PAD (peripheral artery disease)         Ulcer of right lower extremity with fat layer exposed         Venous stasis         Hyperlipidemia, unspecified hyperlipidemia type         HIV positive         Azotemia                To Do List           Future Appointments        Provider Department Dept Phone    3/22/2017 8:30 AM Oz Rodriguez MD [facility] [phone]    2017 8:00 AM Gene Ramos MD [facility] Cardiology [phone]      Goals (5 Years of Data)     None       These Medications        Disp Refills Start End    esomeprazole (NEXIUM) 40 MG capsule 30 capsule 0 3/20/2017 3/20/2018    Take 1 capsule (40 mg total) by mouth before breakfast. - Oral    Pharmacy: [facility] [address] [place] [address] AIRLINE [facility] Ph #: [phone]         [facility]     [facility] -  Assistance  Registered nurses in the [facility] provide clinical advisement, health education, appointment booking, and other advisory services.  Call for this free service at [phone].             Medications           Message regarding Medications     Verify the changes and/or additions to your medication regime listed below are the same as discussed with your clinician today.  If any of these changes or additions are incorrect, please notify your healthcare provider.        START taking these NEW medications        Refills    esomeprazole (NEXIUM) 40 MG capsule 0     Sig: Take 1 capsule (40 mg total) by mouth before breakfast.    Class: Normal    Route: Oral      STOP taking these medications     fish oil-omega-3 fatty acids 300-1,000 mg capsule Take 2 g by mouth 2 (two) times daily.            Verify that the below list of medications is an accurate representation of the medications you are currently taking.  If none reported, the list may be blank. If incorrect, please contact your healthcare provider. Carry this list with you in case of emergency.           Current Medications     abacavir-lamivudine (EPZICOM) 600-300 mg per tablet Take 1 tablet by mouth once daily.    aspirin (ECOTRIN) 81 MG EC tablet Take 81 mg by mouth once daily.    brimonidine 0.1% (ALPHAGAN P) 0.1 % Drop Place 1 drop into both eyes 2 (two) times daily.     CHOLECALCIFEROL, VITAMIN D3, (VITAMIN D3 ORAL) Take 50,000 Units by mouth once a week.     efavirenz (SUSTIVA) 600 mg Tab Take 600 mg by mouth every evening.    EVOTAZ 300-150 mg Tab Take 1 tablet by mouth once daily.    ezetimibe (ZETIA) 10 mg tablet Take 1 tablet (10 mg total) by mouth once daily. For cholesterol    fenofibrate (TRICOR) 145 MG tablet Take 1 tablet by mouth once daily.    latanoprost 0.005 % ophthalmic solution 1 drop every evening.    olmesartan (BENICAR) 40 MG tablet Take 40 mg by mouth once daily.    esomeprazole (NEXIUM) 40 MG capsule Take 1 capsule (40 mg total) by mouth before breakfast.           Clinical Reference Information           Your Vitals Were     BP Pulse Temp Height Weight SpO2    126/70 67 98.2 °F (36.8 °C) 6' (1.829 m) 95.5 kg (210 lb 8.6 oz) 99%    BMI                28.55 kg/m2          Blood Pressure          Most Recent Value    BP  126/70      Allergies as of 3/20/2017     Lipitor [Atorvastatin]      Immunizations Administered on Date of Encounter - 3/20/2017     None      Orders Placed During Today's Visit     Future Labs/Procedures Expected by Expires    US Abdomen Complete  3/20/2017 3/21/2018    X-Ray  Chest PA And Lateral  3/20/2017 3/20/2018    Cardiac treadmill stress test  As directed 3/21/2018      Language Assistance Services     ATTENTION: Language assistance services are available, free of charge. Please call [phone].      ATENCIÓN: Si lei humphries, tiene a marcus disposición servicios gratuitos de asistencia lingüística. Llame al [phone].     CHÚ Ý: N?u b?n nói Ti?ng Vi?t, có các d?ch v? h? tr? ngôn ng? mi?n phí dành cho b?n. G?i s? [phone].         [facility] complies with applicable Federal civil rights laws and does not discriminate on the basis of race, color, national origin, age, disability, or sex.

## 2017-03-20 NOTE — PROGRESS NOTES
Subjective:      Patient ID: Osorio Boggs is a 76 y.o. male.    Chief Complaint: Gastroesophageal Reflux (wakes him up at night, passing alot of gas)    HPI Comments: Months of chest discomfort when supine, relieved by sitting up; right parasternal; 6/10; sticking in quality; no other symptoms; non radiatig;it is not just supine, normally it is; no other treatment feels like needs to burp but doesn't; sips of water helps; not his ticker; had an EKG; normal; sees Dr Rodriguez for venous ulcer; stockings put on    Gastroesophageal Reflux   He complains of chest pain.     Review of Systems   Cardiovascular: Positive for chest pain.   Gastrointestinal:        Burp, gas   Skin: Positive for wound.   All other systems reviewed and are negative.    Objective:     Physical Exam   Constitutional: He is oriented to person, place, and time. He appears well-developed and well-nourished. No distress.   HENT:   Head: Normocephalic and atraumatic.   Right Ear: External ear normal.   Left Ear: External ear normal.   Mouth/Throat: Oropharynx is clear and moist. No oropharyngeal exudate.   Eyes: Conjunctivae and EOM are normal. Pupils are equal, round, and reactive to light. Right eye exhibits no discharge. Left eye exhibits no discharge. No scleral icterus.   Neck: Normal range of motion. Neck supple. No JVD present. No tracheal deviation present. No thyromegaly present.   Cardiovascular: Normal rate and regular rhythm.  Exam reveals no gallop and no friction rub.    No murmur heard.  Pulmonary/Chest: Effort normal and breath sounds normal. No stridor. No respiratory distress. He has no wheezes. He has no rales. He exhibits no tenderness.   Abdominal: Soft. He exhibits no distension and no mass. There is no tenderness. There is no rebound and no guarding.   Musculoskeletal: Normal range of motion. He exhibits no edema or tenderness.   Lymphadenopathy:     He has no cervical adenopathy.   Neurological: He is alert and oriented to  person, place, and time. He has normal reflexes. He displays normal reflexes. No cranial nerve deficit. He exhibits normal muscle tone. Coordination normal.   Skin: Skin is warm and dry. No rash noted. He is not diaphoretic. No erythema. No pallor.   ulcer   Psychiatric: He has a normal mood and affect. His behavior is normal. Judgment and thought content normal.   Nursing note and vitals reviewed.    Assessment:     1. Chest discomfort    2. Chronic venous insufficiency    3. Essential hypertension    4. PAD (peripheral artery disease)    5. Ulcer of right lower extremity with fat layer exposed    6. Venous stasis    7. Hyperlipidemia, unspecified hyperlipidemia type    8. HIV positive    9. Azotemia      Plan:     New Prescriptions    ESOMEPRAZOLE (NEXIUM) 40 MG CAPSULE    Take 1 capsule (40 mg total) by mouth before breakfast.     Discontinued Medications    FISH OIL-OMEGA-3 FATTY ACIDS 300-1,000 MG CAPSULE    Take 2 g by mouth 2 (two) times daily.      Modified Medications    No medications on file       Chest discomfort  -     X-Ray Chest PA And Lateral; Future; Expected date: 3/20/17  -     US Abdomen Complete; Future; Expected date: 3/20/17  -     Cardiac treadmill stress test; Future    Chronic venous insufficiency  -     US Abdomen Complete; Future; Expected date: 3/20/17  -     Cardiac treadmill stress test; Future    Essential hypertension  -     US Abdomen Complete; Future; Expected date: 3/20/17  -     Cardiac treadmill stress test; Future    PAD (peripheral artery disease)  -     US Abdomen Complete; Future; Expected date: 3/20/17  -     Cardiac treadmill stress test; Future    Ulcer of right lower extremity with fat layer exposed  -     US Abdomen Complete; Future; Expected date: 3/20/17  -     Cardiac treadmill stress test; Future    Venous stasis  -     US Abdomen Complete; Future; Expected date: 3/20/17  -     Cardiac treadmill stress test; Future    Hyperlipidemia, unspecified hyperlipidemia  type  -     US Abdomen Complete; Future; Expected date: 3/20/17  -     Cardiac treadmill stress test; Future    HIV positive  -     US Abdomen Complete; Future; Expected date: 3/20/17  -     Cardiac treadmill stress test; Future    Azotemia  -     US Abdomen Complete; Future; Expected date: 3/20/17  -     Cardiac treadmill stress test; Future    Other orders  -     esomeprazole (NEXIUM) 40 MG capsule; Take 1 capsule (40 mg total) by mouth before breakfast.  Dispense: 30 capsule; Refill: 0

## 2017-03-21 ENCOUNTER — TELEPHONE (OUTPATIENT)
Dept: CARDIOLOGY | Facility: CLINIC | Age: 77
End: 2017-03-21

## 2017-03-21 NOTE — TELEPHONE ENCOUNTER
----- Message from Bertha Stiles sent at 3/21/2017 12:47 PM CDT -----  Contact: [phone]  Patient is requesting to speak with the nurse

## 2017-03-22 ENCOUNTER — HOSPITAL ENCOUNTER (OUTPATIENT)
Dept: WOUND CARE | Facility: HOSPITAL | Age: 77
Discharge: HOME | End: 2017-03-22
Attending: SURGERY
Payer: MEDICARE

## 2017-03-22 VITALS
DIASTOLIC BLOOD PRESSURE: 66 MMHG | BODY MASS INDEX: 28.44 KG/M2 | HEIGHT: 72 IN | SYSTOLIC BLOOD PRESSURE: 142 MMHG | HEART RATE: 62 BPM | TEMPERATURE: 99 F | WEIGHT: 210 LBS

## 2017-03-22 DIAGNOSIS — R60.9 EDEMA, UNSPECIFIED TYPE: ICD-10-CM

## 2017-03-22 DIAGNOSIS — S81.809A OPEN WOUND OF KNEE, LEG (EXCEPT THIGH), AND ANKLE, WITHOUT MENTION OF COMPLICATION: ICD-10-CM

## 2017-03-22 DIAGNOSIS — L97.909: ICD-10-CM

## 2017-03-22 DIAGNOSIS — S81.009A OPEN WOUND OF KNEE, LEG (EXCEPT THIGH), AND ANKLE, WITHOUT MENTION OF COMPLICATION: ICD-10-CM

## 2017-03-22 DIAGNOSIS — S91.009A OPEN WOUND OF KNEE, LEG (EXCEPT THIGH), AND ANKLE, WITHOUT MENTION OF COMPLICATION: ICD-10-CM

## 2017-03-22 PROCEDURE — 29581 APPL MULTLAYER CMPRN SYS LEG: CPT

## 2017-03-22 NOTE — TELEPHONE ENCOUNTER
Saw patient in wound care dept today.  Called yesterday to report its difficult to put compression stockings on and requires help, although patient stated the stockings is keeping the swelling down.  Advised to continue wearing stockings as he is no longer on diuretic (patient stated he was taken off of lasix due to elevated kidney function)  Labs done at [facility] by IFEANYI CORNELL (most recent 3/17/17) and prefers not to take diuretics anymore.  Voiced understanding.  Please advise of any new recommendations.  Thanks.

## 2017-03-27 ENCOUNTER — TELEPHONE (OUTPATIENT)
Dept: FAMILY MEDICINE | Facility: CLINIC | Age: 77
End: 2017-03-27

## 2017-03-27 NOTE — TELEPHONE ENCOUNTER
----- Message from Yamileth Russ sent at 3/27/2017  8:27 AM CDT -----  Contact: pt [phone]  Patient would like a call to discuss why he's having these test done tomorrow. US Abdomin, COLOR FLOW DOPPLER ECHO,  XRAY, TREADMILL STRESS TEST. Please advise. Patient was advised that he didn't have these test done yet but still would like to speak with a nurse.

## 2017-03-28 ENCOUNTER — HOSPITAL ENCOUNTER (OUTPATIENT)
Dept: RADIOLOGY | Facility: HOSPITAL | Age: 77
Discharge: HOME | End: 2017-03-28
Attending: FAMILY MEDICINE
Payer: MEDICARE

## 2017-03-28 ENCOUNTER — HOSPITAL ENCOUNTER (OUTPATIENT)
Dept: CARDIOLOGY | Facility: HOSPITAL | Age: 77
Discharge: HOME | End: 2017-03-28
Attending: FAMILY MEDICINE
Payer: MEDICARE

## 2017-03-28 DIAGNOSIS — I10 ESSENTIAL HYPERTENSION: ICD-10-CM

## 2017-03-28 DIAGNOSIS — I87.8 VENOUS STASIS: ICD-10-CM

## 2017-03-28 DIAGNOSIS — I87.2 CHRONIC VENOUS INSUFFICIENCY: Chronic | ICD-10-CM

## 2017-03-28 DIAGNOSIS — Z21 HIV POSITIVE: ICD-10-CM

## 2017-03-28 DIAGNOSIS — L97.912 ULCER OF RIGHT LOWER EXTREMITY WITH FAT LAYER EXPOSED: ICD-10-CM

## 2017-03-28 DIAGNOSIS — I73.9 PAD (PERIPHERAL ARTERY DISEASE): ICD-10-CM

## 2017-03-28 DIAGNOSIS — R07.89 CHEST DISCOMFORT: ICD-10-CM

## 2017-03-28 DIAGNOSIS — E78.5 HYPERLIPIDEMIA, UNSPECIFIED HYPERLIPIDEMIA TYPE: ICD-10-CM

## 2017-03-28 DIAGNOSIS — R79.89 AZOTEMIA: ICD-10-CM

## 2017-03-28 LAB
AORTIC VALVE REGURGITATION: ABNORMAL
DIASTOLIC DYSFUNCTION: NO
DIASTOLIC DYSFUNCTION: YES
ESTIMATED PA SYSTOLIC PRESSURE: 40.95
GLOBAL PERICARDIAL EFFUSION: ABNORMAL
MITRAL VALVE REGURGITATION: ABNORMAL
RETIRED EF AND QEF - SEE NOTES: 60 (ref 55–65)
TRICUSPID VALVE REGURGITATION: ABNORMAL

## 2017-03-28 PROCEDURE — 93306 TTE W/DOPPLER COMPLETE: CPT | Mod: 26,,, | Performed by: INTERNAL MEDICINE

## 2017-03-28 PROCEDURE — 76700 US EXAM ABDOM COMPLETE: CPT | Mod: TC,PO

## 2017-03-28 PROCEDURE — 93017 CV STRESS TEST TRACING ONLY: CPT | Mod: PO

## 2017-03-28 PROCEDURE — 93018 CV STRESS TEST I&R ONLY: CPT | Mod: ,,, | Performed by: INTERNAL MEDICINE

## 2017-03-28 PROCEDURE — 93306 TTE W/DOPPLER COMPLETE: CPT | Mod: PO

## 2017-03-28 PROCEDURE — 71020 XR CHEST PA AND LATERAL: CPT | Mod: TC,PO

## 2017-03-28 PROCEDURE — 93016 CV STRESS TEST SUPVJ ONLY: CPT | Mod: ,,, | Performed by: INTERNAL MEDICINE

## 2017-03-29 ENCOUNTER — HOSPITAL ENCOUNTER (OUTPATIENT)
Dept: WOUND CARE | Facility: HOSPITAL | Age: 77
Discharge: HOME | End: 2017-03-29
Attending: SURGERY
Payer: MEDICARE

## 2017-03-29 VITALS — DIASTOLIC BLOOD PRESSURE: 73 MMHG | TEMPERATURE: 98 F | HEART RATE: 70 BPM | SYSTOLIC BLOOD PRESSURE: 138 MMHG

## 2017-03-29 PROCEDURE — 29581 APPL MULTLAYER CMPRN SYS LEG: CPT

## 2017-03-29 NOTE — PROGRESS NOTES
Much of the documentation for this visit was completed in the [facility] system. Please see the attached documentation for further details about this patient's care.     Pam Birmingham

## 2017-03-30 ENCOUNTER — TELEPHONE (OUTPATIENT)
Dept: FAMILY MEDICINE | Facility: CLINIC | Age: 77
End: 2017-03-30

## 2017-03-30 DIAGNOSIS — R94.39 ABNORMAL STRESS TEST: Primary | ICD-10-CM

## 2017-03-30 DIAGNOSIS — K80.20 CALCULUS OF GALLBLADDER WITHOUT CHOLECYSTITIS WITHOUT OBSTRUCTION: ICD-10-CM

## 2017-03-30 NOTE — TELEPHONE ENCOUNTER
Patient scheduled to see Dr. Ramos on 4/4/2017 at 8:00AM for abnormal stress test. Will schedule appt with general surgery after he sees cardiology.

## 2017-03-30 NOTE — TELEPHONE ENCOUNTER
----- Message from Keaton Adan MD sent at 3/30/2017  6:21 AM CDT -----  Several findings that he needs to see cardiologist for, in addition to abnormal stress test; leaky heart valve, pressure in lungs too high and heart muscle stiff.  Abnormal stress test needs to be addressed soon; no rush on echo findings

## 2017-03-30 NOTE — TELEPHONE ENCOUNTER
----- Message from Keaton Adan MD sent at 3/30/2017  6:46 AM CDT -----  Gallstones; see surgeon after heart evaluated; referral placed to Dr Rosario

## 2017-03-30 NOTE — TELEPHONE ENCOUNTER
----- Message from Keaton Adan MD sent at 3/30/2017  6:18 AM CDT -----  Stress test not normal; needs to see cardiologist; referral placed

## 2017-04-04 ENCOUNTER — OFFICE VISIT (OUTPATIENT)
Dept: CARDIOLOGY | Facility: CLINIC | Age: 77
End: 2017-04-04
Payer: MEDICARE

## 2017-04-04 VITALS
BODY MASS INDEX: 27.99 KG/M2 | SYSTOLIC BLOOD PRESSURE: 171 MMHG | WEIGHT: 206.69 LBS | HEART RATE: 67 BPM | HEIGHT: 72 IN | OXYGEN SATURATION: 98 % | DIASTOLIC BLOOD PRESSURE: 82 MMHG

## 2017-04-04 DIAGNOSIS — E78.5 HYPERLIPIDEMIA, UNSPECIFIED HYPERLIPIDEMIA TYPE: ICD-10-CM

## 2017-04-04 DIAGNOSIS — I87.2 CHRONIC VENOUS INSUFFICIENCY: Primary | Chronic | ICD-10-CM

## 2017-04-04 DIAGNOSIS — I10 ESSENTIAL HYPERTENSION: ICD-10-CM

## 2017-04-04 DIAGNOSIS — R07.9 CHEST PAIN, UNSPECIFIED TYPE: ICD-10-CM

## 2017-04-04 PROCEDURE — 3079F DIAST BP 80-89 MM HG: CPT | Mod: S$GLB,,, | Performed by: INTERNAL MEDICINE

## 2017-04-04 PROCEDURE — 1160F RVW MEDS BY RX/DR IN RCRD: CPT | Mod: S$GLB,,, | Performed by: INTERNAL MEDICINE

## 2017-04-04 PROCEDURE — 3077F SYST BP >= 140 MM HG: CPT | Mod: S$GLB,,, | Performed by: INTERNAL MEDICINE

## 2017-04-04 PROCEDURE — 99999 PR PBB SHADOW E&M-EST. PATIENT-LVL III: CPT | Mod: PBBFAC,,, | Performed by: INTERNAL MEDICINE

## 2017-04-04 PROCEDURE — 1125F AMNT PAIN NOTED PAIN PRSNT: CPT | Mod: S$GLB,,, | Performed by: INTERNAL MEDICINE

## 2017-04-04 PROCEDURE — 99215 OFFICE O/P EST HI 40 MIN: CPT | Mod: S$GLB,,, | Performed by: INTERNAL MEDICINE

## 2017-04-04 PROCEDURE — 1159F MED LIST DOCD IN RCRD: CPT | Mod: S$GLB,,, | Performed by: INTERNAL MEDICINE

## 2017-04-04 PROCEDURE — 1157F ADVNC CARE PLAN IN RCRD: CPT | Mod: S$GLB,,, | Performed by: INTERNAL MEDICINE

## 2017-04-04 PROCEDURE — 99499 UNLISTED E&M SERVICE: CPT | Mod: S$PBB,,, | Performed by: INTERNAL MEDICINE
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
## 2017-04-05 ENCOUNTER — TELEPHONE (OUTPATIENT)
Dept: UROLOGY | Facility: CLINIC | Age: 77
End: 2017-04-05

## 2017-04-05 ENCOUNTER — TELEPHONE (OUTPATIENT)
Dept: SURGERY | Facility: CLINIC | Age: 77
End: 2017-04-05

## 2017-04-05 NOTE — TELEPHONE ENCOUNTER
----- Message from Zeny Soto sent at 4/5/2017 12:44 PM CDT -----  Contact: Self/[phone]  Patient said he would like to speak with you regarding a brace. Please advise

## 2017-04-05 NOTE — TELEPHONE ENCOUNTER
"4-5-17 Patient called and cancelled his appointment for 5-1-17 to be seen about Gallbladder problems. Patient states." I have other medical problems I want to take care of first, my wife has problems. It is not a good time right now for this appointment. I will call when I am ready to be seen."   Appointment cancelled.  "

## 2017-04-06 ENCOUNTER — HOSPITAL ENCOUNTER (OUTPATIENT)
Dept: WOUND CARE | Facility: HOSPITAL | Age: 77
Discharge: HOME | End: 2017-04-06
Attending: SURGERY
Payer: MEDICARE

## 2017-04-06 VITALS
HEART RATE: 60 BPM | DIASTOLIC BLOOD PRESSURE: 71 MMHG | WEIGHT: 206 LBS | HEIGHT: 72 IN | BODY MASS INDEX: 27.9 KG/M2 | SYSTOLIC BLOOD PRESSURE: 148 MMHG | TEMPERATURE: 98 F

## 2017-04-06 PROCEDURE — 99211 OFF/OP EST MAY X REQ PHY/QHP: CPT

## 2017-04-07 ENCOUNTER — OFFICE VISIT (OUTPATIENT)
Dept: FAMILY MEDICINE | Facility: CLINIC | Age: 77
End: 2017-04-07
Payer: MEDICARE

## 2017-04-07 VITALS
HEIGHT: 72 IN | DIASTOLIC BLOOD PRESSURE: 86 MMHG | WEIGHT: 201.75 LBS | RESPIRATION RATE: 18 BRPM | HEART RATE: 72 BPM | BODY MASS INDEX: 27.33 KG/M2 | TEMPERATURE: 98 F | OXYGEN SATURATION: 98 % | SYSTOLIC BLOOD PRESSURE: 132 MMHG

## 2017-04-07 DIAGNOSIS — J40 BRONCHITIS: Primary | ICD-10-CM

## 2017-04-07 PROCEDURE — 99213 OFFICE O/P EST LOW 20 MIN: CPT | Mod: S$GLB,,, | Performed by: FAMILY MEDICINE

## 2017-04-07 PROCEDURE — 3075F SYST BP GE 130 - 139MM HG: CPT | Mod: S$GLB,,, | Performed by: FAMILY MEDICINE

## 2017-04-07 PROCEDURE — 1159F MED LIST DOCD IN RCRD: CPT | Mod: S$GLB,,, | Performed by: FAMILY MEDICINE

## 2017-04-07 PROCEDURE — 1160F RVW MEDS BY RX/DR IN RCRD: CPT | Mod: S$GLB,,, | Performed by: FAMILY MEDICINE

## 2017-04-07 PROCEDURE — 3079F DIAST BP 80-89 MM HG: CPT | Mod: S$GLB,,, | Performed by: FAMILY MEDICINE

## 2017-04-07 PROCEDURE — 1126F AMNT PAIN NOTED NONE PRSNT: CPT | Mod: S$GLB,,, | Performed by: FAMILY MEDICINE

## 2017-04-07 PROCEDURE — 1157F ADVNC CARE PLAN IN RCRD: CPT | Mod: S$GLB,,, | Performed by: FAMILY MEDICINE

## 2017-04-07 RX ORDER — OLMESARTAN MEDOXOMIL 20 MG/1
2 TABLET ORAL 2 TIMES DAILY
COMMUNITY
Start: 2017-04-04 | End: 2017-04-07 | Stop reason: SDUPTHER

## 2017-04-07 RX ORDER — ALBUTEROL SULFATE 90 UG/1
2 AEROSOL, METERED RESPIRATORY (INHALATION) EVERY 6 HOURS PRN
Qty: 18 G | Refills: 1 | Status: SHIPPED | OUTPATIENT
Start: 2017-04-07 | End: 2017-06-06

## 2017-04-07 RX ORDER — ABACAVIR AND LAMIVUDINE 600; 300 MG/1; MG/1
TABLET, FILM COATED ORAL DAILY
COMMUNITY
Start: 2017-04-04 | End: 2017-04-07 | Stop reason: SDUPTHER

## 2017-04-07 NOTE — MR AVS SNAPSHOT
[facility]  [address] [place] [address]  [place] [address]  Phone: [phone]  Fax: [phone]                  Osorio Boggs   2017 10:00 AM   Office Visit    Description:  Male : 1940   Provider:  Keaton Adan MD   Department:  [facility]           Reason for Visit     Follow-up           Diagnoses this Visit        Comments    Bronchitis    -  Primary            To Do List           Future Appointments        Provider Department Dept Phone    2017 8:00 AM Gene Ramos MD [facility] Cardiology [phone]      Goals (5 Years of Data)     None      Follow-Up and Disposition     Return if symptoms worsen or fail to improve.       These Medications        Disp Refills Start End    albuterol 90 mcg/actuation inhaler 18 g 1 2017    Inhale 2 puffs into the lungs every 6 (six) hours as needed for Wheezing. - Inhalation    Pharmacy: [facility] [address] [place], [place] [address] [facility] Ph #: [phone]         [facility] On Call     [facility] On Call Nurse Care Line -  Assistance  Unless otherwise directed by your provider, please contact [facility], our nurse care line that is available for  assistance.     Registered nurses in the [facility] provide: appointment scheduling, clinical advisement, health education, and other advisory services.  Call: [phone] (toll free)               Medications           Message regarding Medications     Verify the changes and/or additions to your medication regime listed below are the same as discussed with your clinician today.  If any of these changes or additions are incorrect, please notify your healthcare provider.        START taking these NEW medications        Refills    albuterol 90 mcg/actuation inhaler 1    Sig: Inhale 2 puffs into the lungs every 6 (six) hours as needed for Wheezing.    Class: Normal    Route: Inhalation           Verify that the below list of  medications is an accurate representation of the medications you are currently taking.  If none reported, the list may be blank. If incorrect, please contact your healthcare provider. Carry this list with you in case of emergency.           Current Medications     abacavir-lamivudine (EPZICOM) 600-300 mg per tablet Take 1 tablet by mouth once daily.    aspirin (ECOTRIN) 81 MG EC tablet Take 81 mg by mouth once daily.    brimonidine 0.1% (ALPHAGAN P) 0.1 % Drop Place 1 drop into both eyes 2 (two) times daily.     CHOLECALCIFEROL, VITAMIN D3, (VITAMIN D3 ORAL) Take 50,000 Units by mouth once a week.     efavirenz (SUSTIVA) 600 mg Tab Take 600 mg by mouth every evening.    esomeprazole (NEXIUM) 40 MG capsule Take 1 capsule (40 mg total) by mouth before breakfast.    EVOTAZ 300-150 mg Tab Take 1 tablet by mouth once daily.    ezetimibe (ZETIA) 10 mg tablet Take 1 tablet (10 mg total) by mouth once daily. For cholesterol    fenofibrate (TRICOR) 145 MG tablet Take 1 tablet by mouth once daily.    latanoprost 0.005 % ophthalmic solution 1 drop every evening.    olmesartan (BENICAR) 40 MG tablet Take 40 mg by mouth once daily.    albuterol 90 mcg/actuation inhaler Inhale 2 puffs into the lungs every 6 (six) hours as needed for Wheezing.           Clinical Reference Information           Your Vitals Were     BP Pulse Temp Resp Height Weight    132/86 (BP Location: Right arm, Patient Position: Sitting, BP Method: Manual) 72 98 °F (36.7 °C) (Oral) 18 6' (1.829 m) 91.5 kg (201 lb 11.5 oz)    SpO2 BMI             98% 27.36 kg/m2         Blood Pressure          Most Recent Value    BP  132/86      Allergies as of 4/7/2017     Lipitor [Atorvastatin]      Immunizations Administered on Date of Encounter - 4/7/2017     None      Orders Placed During Today's Visit     Future Labs/Procedures Expected by Expires    Complete PFT without bronchodilator  As directed 4/8/2018      Language Assistance Services     ATTENTION: Language  assistance services are available, free of charge. Please call [phone].      ATENCIÓN: Si lei humphries, tiene a marcus disposición servicios gratuitos de asistencia lingüística. Llame al [phone].     CHÚ Ý: N?u b?n nói Ti?ng Vi?t, có các d?ch v? h? tr? ngôn ng? mi?n phí dành cho b?n. G?i s? [phone].         [facility] complies with applicable Federal civil rights laws and does not discriminate on the basis of race, color, national origin, age, disability, or sex.

## 2017-04-07 NOTE — PROGRESS NOTES
Subjective:      Patient ID: Osorio Boggs is a 76 y.o. male.    Chief Complaint: Follow-up    HPI Comments: Cough wheeze again; same in past; no fever, some mucous, not sob; ex smoker; daughter had asthma;    Review of Systems   Respiratory: Positive for cough and wheezing. Negative for shortness of breath.    All other systems reviewed and are negative.    Objective:     Physical Exam   Constitutional: He is oriented to person, place, and time. He appears well-developed and well-nourished. No distress.   HENT:   Head: Normocephalic and atraumatic.   Right Ear: External ear normal.   Left Ear: External ear normal.   Mouth/Throat: Oropharynx is clear and moist. No oropharyngeal exudate.   Eyes: Conjunctivae and EOM are normal. Pupils are equal, round, and reactive to light. Right eye exhibits no discharge. Left eye exhibits no discharge. No scleral icterus.   Neck: Normal range of motion. Neck supple. No JVD present. No tracheal deviation present. No thyromegaly present.   Cardiovascular: Normal rate and regular rhythm.  Exam reveals no gallop and no friction rub.    No murmur heard.  Pulmonary/Chest: Effort normal. No stridor. No respiratory distress. He has wheezes. He has no rales. He exhibits no tenderness.   Abdominal: Soft. He exhibits no distension and no mass. There is no tenderness. There is no rebound and no guarding.   Musculoskeletal: Normal range of motion. He exhibits no edema or tenderness.   Lymphadenopathy:     He has no cervical adenopathy.   Neurological: He is alert and oriented to person, place, and time. He has normal reflexes. He displays normal reflexes. No cranial nerve deficit. He exhibits normal muscle tone. Coordination normal.   Skin: Skin is warm and dry. No rash noted. He is not diaphoretic. No erythema. No pallor.   Psychiatric: He has a normal mood and affect. His behavior is normal. Judgment and thought content normal.   Nursing note and vitals reviewed.    Assessment:     1.  Bronchitis      Plan:     New Prescriptions    ALBUTEROL 90 MCG/ACTUATION INHALER    Inhale 2 puffs into the lungs every 6 (six) hours as needed for Wheezing.     Discontinued Medications    ABACAVIR-LAMIVUDINE (EPZICOM) 600-300 MG PER TABLET    Take by mouth once daily.    OLMESARTAN (BENICAR) 20 MG TABLET    Take 2 tablets by mouth 2 (two) times daily.     Modified Medications    No medications on file       Bronchitis  -     Complete PFT without bronchodilator; Future    Other orders  -     albuterol 90 mcg/actuation inhaler; Inhale 2 puffs into the lungs every 6 (six) hours as needed for Wheezing.  Dispense: 18 g; Refill: 1

## 2017-04-13 ENCOUNTER — TELEPHONE (OUTPATIENT)
Dept: CARDIOLOGY | Facility: CLINIC | Age: 77
End: 2017-04-13

## 2017-04-13 NOTE — TELEPHONE ENCOUNTER
Patient ready to proceed with [facility].  He will not be available on the following dates:  5/1/17, 7/10

## 2017-05-03 NOTE — TELEPHONE ENCOUNTER
Patient has an appointment with Dr Ramos on 5/16 in [place].  Will plan for angiogram at that time and sign consents.

## 2017-05-04 ENCOUNTER — TELEPHONE (OUTPATIENT)
Dept: CARDIOLOGY | Facility: CLINIC | Age: 77
End: 2017-05-04

## 2017-05-04 NOTE — TELEPHONE ENCOUNTER
----- Message from Kandy Gilbert sent at 5/4/2017  8:29 AM CDT -----  Contact: [phone]/self   Pt called stating last time he was in the office the nurse told him his next appointment was schedule on 05/16/17. Pt its calling this morning requesting to speak with nurse to have this appointment reschedule because he is going out of town  . I offered to reschedule pt's appointment and he didn't want me to reschedule it . Please advise

## 2017-05-30 ENCOUNTER — TELEPHONE (OUTPATIENT)
Dept: CARDIOLOGY | Facility: CLINIC | Age: 77
End: 2017-05-30

## 2017-05-30 NOTE — TELEPHONE ENCOUNTER
----- Message from Therese Lucas sent at 2017 12:48 PM CDT -----  Contact: self, [phone]  Patient states he is back after mother's  and requests to speak with you regarding his ongoing illness. Please advise.

## 2017-05-30 NOTE — TELEPHONE ENCOUNTER
Returned call, no answer for patient. An appointment has been made to follow up with Dr Ramos as originally planned.

## 2017-06-06 ENCOUNTER — OFFICE VISIT (OUTPATIENT)
Dept: CARDIOLOGY | Facility: CLINIC | Age: 77
End: 2017-06-06
Payer: MEDICARE

## 2017-06-06 VITALS
OXYGEN SATURATION: 98 % | HEART RATE: 60 BPM | HEIGHT: 72 IN | SYSTOLIC BLOOD PRESSURE: 120 MMHG | BODY MASS INDEX: 26.68 KG/M2 | DIASTOLIC BLOOD PRESSURE: 68 MMHG | WEIGHT: 197 LBS

## 2017-06-06 DIAGNOSIS — I10 ESSENTIAL HYPERTENSION: ICD-10-CM

## 2017-06-06 DIAGNOSIS — R07.9 CHEST PAIN, UNSPECIFIED TYPE: Primary | ICD-10-CM

## 2017-06-06 PROCEDURE — 99214 OFFICE O/P EST MOD 30 MIN: CPT | Mod: S$GLB,,, | Performed by: INTERNAL MEDICINE

## 2017-06-06 PROCEDURE — 1159F MED LIST DOCD IN RCRD: CPT | Mod: S$GLB,,, | Performed by: INTERNAL MEDICINE

## 2017-06-06 PROCEDURE — 99499 UNLISTED E&M SERVICE: CPT | Mod: S$PBB,,, | Performed by: INTERNAL MEDICINE

## 2017-06-06 PROCEDURE — 1126F AMNT PAIN NOTED NONE PRSNT: CPT | Mod: S$GLB,,, | Performed by: INTERNAL MEDICINE

## 2017-06-06 PROCEDURE — 99999 PR PBB SHADOW E&M-EST. PATIENT-LVL IV: CPT | Mod: PBBFAC,,, | Performed by: INTERNAL MEDICINE
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
## 2017-06-12 ENCOUNTER — TELEPHONE (OUTPATIENT)
Dept: CARDIOLOGY | Facility: CLINIC | Age: 77
End: 2017-06-12

## 2017-06-12 NOTE — TELEPHONE ENCOUNTER
----- Message from Kandy Gilbert sent at 6/12/2017  9:30 AM CDT -----  Contact: [phone]  Pt requesting to speak with you in regarding his procedure coming up on 06/19/17. Please advise

## 2017-06-13 NOTE — TELEPHONE ENCOUNTER
Patient called back today.  Questioning instructions that were given to him after clinical visit with Dr Ramos.  Informed patient he is to be NPO after midnight, take ASA as usual, do not change regimen.  Re-reviewed allergies.  Questioned metformin and brillanta, plavix.  Informed patient he is not taking those medications, therefore, it does not apply to him.  Asked patient to look at information that was marked off especially for him.  Patient voiced understanding, all questions answered before disconnection of conversation.

## 2017-06-14 NOTE — PRE ADMISSION SCREENING
Called and spoke with pt, arrival time 10am. Verbalizes full understanding to all pre-op instructions and denies questions.

## 2017-06-19 ENCOUNTER — SURGERY (OUTPATIENT)
Age: 77
End: 2017-06-19

## 2017-06-19 ENCOUNTER — HOSPITAL ENCOUNTER (OUTPATIENT)
Facility: HOSPITAL | Age: 77
Discharge: HOME | End: 2017-06-20
Attending: INTERNAL MEDICINE | Admitting: INTERNAL MEDICINE
Payer: MEDICARE

## 2017-06-19 DIAGNOSIS — I25.10 CORONARY ARTERY DISEASE, ANGINA PRESENCE UNSPECIFIED, UNSPECIFIED VESSEL OR LESION TYPE, UNSPECIFIED WHETHER NATIVE OR TRANSPLANTED HEART: Primary | ICD-10-CM

## 2017-06-19 DIAGNOSIS — R07.9 CHEST PAIN: ICD-10-CM

## 2017-06-19 DIAGNOSIS — I10 ESSENTIAL HYPERTENSION: ICD-10-CM

## 2017-06-19 DIAGNOSIS — I10 ESSENTIAL (PRIMARY) HYPERTENSION: ICD-10-CM

## 2017-06-19 LAB
ANION GAP SERPL CALC-SCNC: 8 MMOL/L
BASOPHILS # BLD AUTO: 0.02 K/UL
BASOPHILS NFR BLD: 0.2 %
BUN SERPL-MCNC: 29 MG/DL
CALCIUM SERPL-MCNC: 8.9 MG/DL
CHLORIDE SERPL-SCNC: 107 MMOL/L
CO2 SERPL-SCNC: 22 MMOL/L
CORONARY STENOSIS: ABNORMAL
CORONARY STENT: YES
CREAT SERPL-MCNC: 1.4 MG/DL
DIFFERENTIAL METHOD: ABNORMAL
EOSINOPHIL # BLD AUTO: 0.5 K/UL
EOSINOPHIL NFR BLD: 5.6 %
ERYTHROCYTE [DISTWIDTH] IN BLOOD BY AUTOMATED COUNT: 13.8 %
EST. GFR  (AFRICAN AMERICAN): 55 ML/MIN/1.73 M^2
EST. GFR  (NON AFRICAN AMERICAN): 48 ML/MIN/1.73 M^2
GLUCOSE SERPL-MCNC: 96 MG/DL
HCT VFR BLD AUTO: 39.7 %
HGB BLD-MCNC: 13.2 G/DL
INR PPP: 1
LYMPHOCYTES # BLD AUTO: 3.8 K/UL
LYMPHOCYTES NFR BLD: 41.2 %
MCH RBC QN AUTO: 32.8 PG
MCHC RBC AUTO-ENTMCNC: 33.2 %
MCV RBC AUTO: 99 FL
MONOCYTES # BLD AUTO: 0.8 K/UL
MONOCYTES NFR BLD: 8.7 %
NEUTROPHILS # BLD AUTO: 4.1 K/UL
NEUTROPHILS NFR BLD: 44.3 %
PLATELET # BLD AUTO: 221 K/UL
PMV BLD AUTO: 11.5 FL
POTASSIUM SERPL-SCNC: 4.3 MMOL/L
PROTHROMBIN TIME: 10.7 SEC
RBC # BLD AUTO: 4.02 M/UL
SODIUM SERPL-SCNC: 137 MMOL/L
WBC # BLD AUTO: 9.21 K/UL

## 2017-06-19 PROCEDURE — 93005 ELECTROCARDIOGRAM TRACING: CPT

## 2017-06-19 PROCEDURE — 25000003 PHARM REV CODE 250: Performed by: INTERNAL MEDICINE

## 2017-06-19 PROCEDURE — 27200085 CATH LAB PROCEDURE

## 2017-06-19 PROCEDURE — 92979 ENDOLUMINL IVUS OCT C EA: CPT | Mod: 26,,, | Performed by: INTERNAL MEDICINE

## 2017-06-19 PROCEDURE — 85025 COMPLETE CBC W/AUTO DIFF WBC: CPT

## 2017-06-19 PROCEDURE — 92928 PRQ TCAT PLMT NTRAC ST 1 LES: CPT | Mod: LC,,, | Performed by: INTERNAL MEDICINE

## 2017-06-19 PROCEDURE — 80048 BASIC METABOLIC PNL TOTAL CA: CPT

## 2017-06-19 PROCEDURE — 85610 PROTHROMBIN TIME: CPT

## 2017-06-19 PROCEDURE — 63600175 PHARM REV CODE 636 W HCPCS

## 2017-06-19 PROCEDURE — 93010 ELECTROCARDIOGRAM REPORT: CPT | Mod: S$GLB,,, | Performed by: INTERNAL MEDICINE

## 2017-06-19 PROCEDURE — 92978 ENDOLUMINL IVUS OCT C 1ST: CPT | Mod: 26,,, | Performed by: INTERNAL MEDICINE

## 2017-06-19 PROCEDURE — 25000003 PHARM REV CODE 250

## 2017-06-19 PROCEDURE — 99152 MOD SED SAME PHYS/QHP 5/>YRS: CPT | Mod: ,,, | Performed by: INTERNAL MEDICINE

## 2017-06-19 PROCEDURE — C1753 CATH, INTRAVAS ULTRASOUND: HCPCS

## 2017-06-19 PROCEDURE — 92920 PRQ TRLUML C ANGIOP 1ART&/BR: CPT | Mod: RC,59,, | Performed by: INTERNAL MEDICINE

## 2017-06-19 PROCEDURE — 93458 L HRT ARTERY/VENTRICLE ANGIO: CPT | Mod: 26,59,, | Performed by: INTERNAL MEDICINE

## 2017-06-19 RX ORDER — LAMIVUDINE 150 MG/1
300 TABLET, FILM COATED ORAL DAILY
Status: DISCONTINUED | OUTPATIENT
Start: 2017-06-20 | End: 2017-06-20 | Stop reason: HOSPADM

## 2017-06-19 RX ORDER — ABACAVIR 300 MG/1
600 TABLET ORAL DAILY
Status: DISCONTINUED | OUTPATIENT
Start: 2017-06-20 | End: 2017-06-20 | Stop reason: HOSPADM

## 2017-06-19 RX ORDER — ISOSORBIDE MONONITRATE 30 MG/1
30 TABLET, EXTENDED RELEASE ORAL DAILY
Status: DISCONTINUED | OUTPATIENT
Start: 2017-06-19 | End: 2017-06-20 | Stop reason: HOSPADM

## 2017-06-19 RX ORDER — CLOPIDOGREL BISULFATE 75 MG/1
75 TABLET ORAL DAILY
Status: DISCONTINUED | OUTPATIENT
Start: 2017-06-20 | End: 2017-06-20 | Stop reason: HOSPADM

## 2017-06-19 RX ORDER — ABACAVIR AND LAMIVUDINE 600; 300 MG/1; MG/1
1 TABLET, FILM COATED ORAL DAILY
Status: DISCONTINUED | OUTPATIENT
Start: 2017-06-20 | End: 2017-06-19

## 2017-06-19 RX ORDER — OXYCODONE HYDROCHLORIDE 5 MG/1
5 TABLET ORAL EVERY 4 HOURS PRN
Status: DISCONTINUED | OUTPATIENT
Start: 2017-06-19 | End: 2017-06-20 | Stop reason: HOSPADM

## 2017-06-19 RX ORDER — ASPIRIN 325 MG
325 TABLET ORAL ONCE
Status: COMPLETED | OUTPATIENT
Start: 2017-06-19 | End: 2017-06-19

## 2017-06-19 RX ORDER — ONDANSETRON 8 MG/1
8 TABLET, ORALLY DISINTEGRATING ORAL EVERY 8 HOURS PRN
Status: DISCONTINUED | OUTPATIENT
Start: 2017-06-19 | End: 2017-06-20 | Stop reason: HOSPADM

## 2017-06-19 RX ORDER — ASPIRIN 81 MG/1
81 TABLET ORAL DAILY
Status: DISCONTINUED | OUTPATIENT
Start: 2017-06-20 | End: 2017-06-20 | Stop reason: HOSPADM

## 2017-06-19 RX ORDER — BRIMONIDINE TARTRATE 1 MG/ML
1 SOLUTION/ DROPS OPHTHALMIC 2 TIMES DAILY
Status: DISCONTINUED | OUTPATIENT
Start: 2017-06-19 | End: 2017-06-19

## 2017-06-19 RX ORDER — BRIMONIDINE TARTRATE 1.5 MG/ML
1 SOLUTION/ DROPS OPHTHALMIC 2 TIMES DAILY
Status: DISCONTINUED | OUTPATIENT
Start: 2017-06-19 | End: 2017-06-20 | Stop reason: HOSPADM

## 2017-06-19 RX ORDER — SODIUM CHLORIDE 9 MG/ML
INJECTION, SOLUTION INTRAVENOUS ONCE
Status: COMPLETED | OUTPATIENT
Start: 2017-06-19 | End: 2017-06-19

## 2017-06-19 RX ORDER — LATANOPROST 50 UG/ML
1 SOLUTION/ DROPS OPHTHALMIC NIGHTLY
Status: DISCONTINUED | OUTPATIENT
Start: 2017-06-19 | End: 2017-06-20 | Stop reason: HOSPADM

## 2017-06-19 RX ORDER — EZETIMIBE 10 MG/1
10 TABLET ORAL DAILY
Status: DISCONTINUED | OUTPATIENT
Start: 2017-06-20 | End: 2017-06-20 | Stop reason: HOSPADM

## 2017-06-19 RX ORDER — EFAVIRENZ 600 MG/1
600 TABLET, FILM COATED ORAL NIGHTLY
Status: DISCONTINUED | OUTPATIENT
Start: 2017-06-19 | End: 2017-06-19

## 2017-06-19 RX ORDER — EFAVIRENZ, EMTRICITABINE AND TENOFOVIR DISOPROXIL FUMARATE 600; 200; 300 MG/1; MG/1; MG/1
1 TABLET, FILM COATED ORAL DAILY
Status: DISCONTINUED | OUTPATIENT
Start: 2017-06-20 | End: 2017-06-19

## 2017-06-19 RX ORDER — CLOPIDOGREL BISULFATE 75 MG/1
600 TABLET ORAL ONCE
Status: COMPLETED | OUTPATIENT
Start: 2017-06-19 | End: 2017-06-19

## 2017-06-19 RX ORDER — OLMESARTAN MEDOXOMIL 20 MG/1
40 TABLET ORAL DAILY
Status: DISCONTINUED | OUTPATIENT
Start: 2017-06-20 | End: 2017-06-20 | Stop reason: HOSPADM

## 2017-06-19 RX ORDER — EFAVIRENZ 600 MG/1
600 TABLET, FILM COATED ORAL DAILY
Status: DISCONTINUED | OUTPATIENT
Start: 2017-06-20 | End: 2017-06-20 | Stop reason: HOSPADM

## 2017-06-19 RX ORDER — SODIUM CHLORIDE 9 MG/ML
3 INJECTION, SOLUTION INTRAVENOUS CONTINUOUS
Status: ACTIVE | OUTPATIENT
Start: 2017-06-19 | End: 2017-06-19

## 2017-06-19 RX ADMIN — CLOPIDOGREL BISULFATE 600 MG: 75 TABLET ORAL at 10:06

## 2017-06-19 RX ADMIN — LATANOPROST 1 DROP: 50 SOLUTION OPHTHALMIC at 09:06

## 2017-06-19 RX ADMIN — SODIUM CHLORIDE 3 ML/KG/HR: 0.9 INJECTION, SOLUTION INTRAVENOUS at 02:06

## 2017-06-19 RX ADMIN — ISOSORBIDE MONONITRATE 30 MG: 30 TABLET, EXTENDED RELEASE ORAL at 05:06

## 2017-06-19 RX ADMIN — SODIUM CHLORIDE: 0.9 INJECTION, SOLUTION INTRAVENOUS at 10:06

## 2017-06-19 RX ADMIN — ASPIRIN 325 MG ORAL TABLET 325 MG: 325 PILL ORAL at 10:06

## 2017-06-19 NOTE — PLAN OF CARE
Patient arrived on unit via stretcher from cath lab. In no apparent distress. VSS. Family at bedside. Will continue to monitor.

## 2017-06-19 NOTE — INTERVAL H&P NOTE
The patient has been examined and the H&P has been reviewed:    I concur with the findings and no changes have occurred since H&P was written.    Anesthesia/Surgery risks, benefits and alternative options discussed and understood by patient/family.          There are no hospital problems to display for this patient.        Gene Ramos MD, FACC, CCDS  Interventional Cardiology  [facility]

## 2017-06-19 NOTE — NURSING TRANSFER
Report called to [facility]. Pt transported on tele monitor. Tele box applied upon arrival to room. Right radial site reviewed with RN at BS. Site WNL, no swelling, bleeding, oozing, or hematoma present. Pt walked to bed with no problems. Vitals stable. Pt with no complaints. All belongings sent with pt to room. Family at BS.
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
## 2017-06-19 NOTE — PLAN OF CARE
Problem: Patient Care Overview  Goal: Plan of Care Review  Outcome: Ongoing (interventions implemented as appropriate)  Plan of care reviewed with patient. Patient verbalized understanding. Right radial puncture site clean, dry and intact. Right radial pressure device to be removed. Patient denies pain/discomfort. Neurovascular checks intact. Patient's family is at bedside. Patient sinus bradycardia on telemetry monitoring, HR 40's-50's, with no true red alarms noted. Bed is low and locked, bed alarm is activated, call light is within reach. Fall contract signed and in chart. Patient has been instructed to call if in need of assistance. Verbalized understanding.

## 2017-06-19 NOTE — NURSING
First 2 cc air removed from vasc band per protocol, site WNL. No swelling, oozing, bleeding, or hematoma present. Will continue to remove air per protocol and will continue to monitor.

## 2017-06-20 VITALS
WEIGHT: 197 LBS | BODY MASS INDEX: 26.68 KG/M2 | OXYGEN SATURATION: 98 % | RESPIRATION RATE: 18 BRPM | TEMPERATURE: 99 F | DIASTOLIC BLOOD PRESSURE: 76 MMHG | HEART RATE: 60 BPM | HEIGHT: 72 IN | SYSTOLIC BLOOD PRESSURE: 160 MMHG

## 2017-06-20 LAB
ANION GAP SERPL CALC-SCNC: 5 MMOL/L
BUN SERPL-MCNC: 29 MG/DL
CALCIUM SERPL-MCNC: 8 MG/DL
CHLORIDE SERPL-SCNC: 110 MMOL/L
CO2 SERPL-SCNC: 22 MMOL/L
CREAT SERPL-MCNC: 1.3 MG/DL
EST. GFR  (AFRICAN AMERICAN): >60 ML/MIN/1.73 M^2
EST. GFR  (NON AFRICAN AMERICAN): 52 ML/MIN/1.73 M^2
GLUCOSE SERPL-MCNC: 92 MG/DL
POTASSIUM SERPL-SCNC: 4.5 MMOL/L
SODIUM SERPL-SCNC: 137 MMOL/L

## 2017-06-20 PROCEDURE — 25500020 PHARM REV CODE 255

## 2017-06-20 PROCEDURE — 25000003 PHARM REV CODE 250: Performed by: INTERNAL MEDICINE

## 2017-06-20 PROCEDURE — 80048 BASIC METABOLIC PNL TOTAL CA: CPT

## 2017-06-20 PROCEDURE — 99217 PR OBSERVATION CARE DISCHARGE: CPT | Mod: ,,, | Performed by: INTERNAL MEDICINE

## 2017-06-20 PROCEDURE — 36415 COLL VENOUS BLD VENIPUNCTURE: CPT

## 2017-06-20 RX ORDER — CLOPIDOGREL BISULFATE 75 MG/1
75 TABLET ORAL DAILY
Qty: 30 TABLET | Refills: 11 | Status: SHIPPED | OUTPATIENT
Start: 2017-06-20 | End: 2018-06-24 | Stop reason: SDUPTHER

## 2017-06-20 RX ORDER — ISOSORBIDE MONONITRATE 30 MG/1
30 TABLET, EXTENDED RELEASE ORAL DAILY
Qty: 30 TABLET | Refills: 11 | Status: SHIPPED | OUTPATIENT
Start: 2017-06-20 | End: 2018-01-02 | Stop reason: ALTCHOICE

## 2017-06-20 RX ADMIN — EFAVIRENZ 600 MG: 600 TABLET, FILM COATED ORAL at 08:06

## 2017-06-20 RX ADMIN — OLMESARTAN MEDOXOMIL 40 MG: 20 TABLET, FILM COATED ORAL at 08:06

## 2017-06-20 RX ADMIN — ISOSORBIDE MONONITRATE 30 MG: 30 TABLET, EXTENDED RELEASE ORAL at 08:06

## 2017-06-20 RX ADMIN — ABACAVIR SULFATE 600 MG: 300 TABLET, FILM COATED ORAL at 08:06

## 2017-06-20 RX ADMIN — EZETIMIBE 10 MG: 10 TABLET ORAL at 08:06

## 2017-06-20 RX ADMIN — ASPIRIN 81 MG: 81 TABLET, COATED ORAL at 08:06

## 2017-06-20 RX ADMIN — CLOPIDOGREL BISULFATE 75 MG: 75 TABLET ORAL at 08:06

## 2017-06-20 RX ADMIN — LAMIVUDINE 300 MG: 150 TABLET, FILM COATED ORAL at 08:06

## 2017-06-20 RX ADMIN — BRIMONIDINE TARTRATE 1 DROP: 1.5 SOLUTION OPHTHALMIC at 08:06

## 2017-06-20 NOTE — PLAN OF CARE
Future Appointments  Date Time Provider Department Center   6/27/2017 4:00 PM Gene Ramos MD [facility] CARDIO [facility]        06/20/17 0932   Final Note   Assessment Type Final Discharge Note   Discharge Disposition Home   Discharge planning education complete? Yes   Hospital Follow Up  Appt(s) scheduled? Yes   Right Care Referral Info   Post Acute Recommendation No Care

## 2017-06-20 NOTE — PLAN OF CARE
Problem: Patient Care Overview  Goal: Plan of Care Review  Outcome: Ongoing (interventions implemented as appropriate)  Reviewed plan of care with pt. R radial site dressing CDI. No hematoma noted. VSS. Pt sinus peg 40's-50's on tele. Pt denies any pain overnight. Safety measures are in place, bed low and in lock position, call light in reach, and bed alarm is on. Pt verbalizes full understanding of their plan of care.

## 2017-06-20 NOTE — DISCHARGE INSTRUCTIONS
CARDIAC CATHETERIZATION, DISCHARGE INSTRUCTIONS FOR (ENGLISH) View Edit Remove   CARDIAC CATHETERIZATION, TRANSRADIAL, UNDERSTANDING (ENGLISH) View Edit Remove   ISOSORBIDE MONONITRATE EXTENDED-RELEASE TABLETS (ENGLISH) View Edit Remove   CLOPIDOGREL BISULFATE ORAL TABLET (ENGLISH) View Edit Remove   EATING HEART-HEALTHY FOODS (ENGLISH) View Edit Remove
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
## 2017-06-20 NOTE — PLAN OF CARE
Discharge instructions reviewed with patient. Patient verbalized understanding of discharge instructions. PIV discontinued, IV tip intact. Patient to be discharged home.

## 2017-06-20 NOTE — PLAN OF CARE
06/20/17 0924   Discharge Assessment   Assessment Type Discharge Planning Assessment   Confirmed/corrected address and phone number on facesheet? Yes   Assessment information obtained from? Patient   Expected Length of Stay (days) 1   Communicated expected length of stay with patient/caregiver yes   Prior to hospitilization cognitive status: Alert/Oriented   Prior to hospitalization functional status: Independent   Current cognitive status: Alert/Oriented   Current Functional Status: Independent   Arrived From home or self-care   Lives With spouse   Able to Return to Prior Arrangements yes   Is patient able to care for self after discharge? Yes   How many people do you have in your home that can help with your care after discharge? 1   Who are your caregiver(s) and their phone number(s)? [phone]   Patient's perception of discharge disposition home or selfcare   Readmission Within The Last 30 Days no previous admission in last 30 days   Patient currently being followed by outpatient case management? No   Patient currently receives home health services? No   Does the patient currently use HME? No   Patient currently receives private duty nursing? No   Patient currently receives any other outside agency services? No   Equipment Currently Used at Home none   Do you have any problems affording any of your prescribed medications? No   Is the patient taking medications as prescribed? yes   Do you have any financial concerns preventing you from receiving the healthcare you need? No   Does the patient have transportation to healthcare appointments? Yes   Transportation Available family or friend will provide   On Dialysis? No   Does the patient receive services at the Coumadin Clinic? No   Are there any open cases? No   Discharge Plan A Home with family   Discharge Plan B Home   Patient/Family In Agreement With Plan yes

## 2017-06-26 LAB
POC ACTIVATED CLOTTING TIME K: 186 SEC (ref 74–137)
POC ACTIVATED CLOTTING TIME K: 202 SEC (ref 74–137)
POC ACTIVATED CLOTTING TIME K: 230 SEC (ref 74–137)
SAMPLE: ABNORMAL

## 2017-06-27 ENCOUNTER — OFFICE VISIT (OUTPATIENT)
Dept: CARDIOLOGY | Facility: CLINIC | Age: 77
End: 2017-06-27
Payer: MEDICARE

## 2017-06-27 VITALS
HEIGHT: 72 IN | SYSTOLIC BLOOD PRESSURE: 147 MMHG | BODY MASS INDEX: 26.82 KG/M2 | OXYGEN SATURATION: 97 % | WEIGHT: 198 LBS | HEART RATE: 60 BPM | DIASTOLIC BLOOD PRESSURE: 74 MMHG

## 2017-06-27 DIAGNOSIS — I10 ESSENTIAL HYPERTENSION: ICD-10-CM

## 2017-06-27 DIAGNOSIS — E78.5 HYPERLIPIDEMIA, UNSPECIFIED HYPERLIPIDEMIA TYPE: ICD-10-CM

## 2017-06-27 DIAGNOSIS — I87.2 CHRONIC VENOUS INSUFFICIENCY: Primary | Chronic | ICD-10-CM

## 2017-06-27 DIAGNOSIS — I73.9 PAD (PERIPHERAL ARTERY DISEASE): ICD-10-CM

## 2017-06-27 DIAGNOSIS — I25.10 CORONARY ARTERY DISEASE, ANGINA PRESENCE UNSPECIFIED, UNSPECIFIED VESSEL OR LESION TYPE, UNSPECIFIED WHETHER NATIVE OR TRANSPLANTED HEART: ICD-10-CM

## 2017-06-27 PROCEDURE — 99499 UNLISTED E&M SERVICE: CPT | Mod: S$PBB,,, | Performed by: INTERNAL MEDICINE

## 2017-06-27 PROCEDURE — 99999 PR PBB SHADOW E&M-EST. PATIENT-LVL III: CPT | Mod: PBBFAC,,, | Performed by: INTERNAL MEDICINE

## 2017-06-27 PROCEDURE — 99214 OFFICE O/P EST MOD 30 MIN: CPT | Mod: S$GLB,,, | Performed by: INTERNAL MEDICINE

## 2017-06-27 PROCEDURE — 1125F AMNT PAIN NOTED PAIN PRSNT: CPT | Mod: S$GLB,,, | Performed by: INTERNAL MEDICINE

## 2017-06-27 PROCEDURE — 1159F MED LIST DOCD IN RCRD: CPT | Mod: S$GLB,,, | Performed by: INTERNAL MEDICINE
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
## 2017-06-29 ENCOUNTER — TELEPHONE (OUTPATIENT)
Dept: CARDIOLOGY | Facility: CLINIC | Age: 77
End: 2017-06-29

## 2017-06-29 NOTE — TELEPHONE ENCOUNTER
Clinical notes, wound care notes, orders for compression stockings, and medical necessity form submitted to [facility] for review.

## 2017-06-29 NOTE — TELEPHONE ENCOUNTER
----- Message from Ana Aguirre sent at 6/29/2017  3:22 PM CDT -----  Contact: Christy / [facility] [phone] opt [phone] fax [phone]  Christy is requesting a medical necessity form, orders and clinical notes for compression stockings. Please fax back to [phone].

## 2017-07-06 ENCOUNTER — SURGERY (OUTPATIENT)
Age: 77
End: 2017-07-06

## 2017-07-06 ENCOUNTER — HOSPITAL ENCOUNTER (OUTPATIENT)
Facility: HOSPITAL | Age: 77
Discharge: HOME | End: 2017-07-07
Attending: INTERNAL MEDICINE | Admitting: INTERNAL MEDICINE
Payer: MEDICARE

## 2017-07-06 DIAGNOSIS — I25.10 CORONARY ARTERY DISEASE INVOLVING NATIVE CORONARY ARTERY OF NATIVE HEART WITHOUT ANGINA PECTORIS: ICD-10-CM

## 2017-07-06 DIAGNOSIS — I25.10 CORONARY ARTERY DISEASE, ANGINA PRESENCE UNSPECIFIED, UNSPECIFIED VESSEL OR LESION TYPE, UNSPECIFIED WHETHER NATIVE OR TRANSPLANTED HEART: Primary | ICD-10-CM

## 2017-07-06 DIAGNOSIS — H40.9 GLAUCOMA: ICD-10-CM

## 2017-07-06 LAB
ANION GAP SERPL CALC-SCNC: 11 MMOL/L
BASOPHILS # BLD AUTO: 0.03 K/UL
BASOPHILS NFR BLD: 0.4 %
BUN SERPL-MCNC: 21 MG/DL
CALCIUM SERPL-MCNC: 9.5 MG/DL
CHLORIDE SERPL-SCNC: 104 MMOL/L
CO2 SERPL-SCNC: 21 MMOL/L
CORONARY STENOSIS: ABNORMAL
CREAT SERPL-MCNC: 1.3 MG/DL
DIFFERENTIAL METHOD: ABNORMAL
EOSINOPHIL # BLD AUTO: 0.4 K/UL
EOSINOPHIL NFR BLD: 5.5 %
ERYTHROCYTE [DISTWIDTH] IN BLOOD BY AUTOMATED COUNT: 13.4 %
EST. GFR  (AFRICAN AMERICAN): >60 ML/MIN/1.73 M^2
EST. GFR  (NON AFRICAN AMERICAN): 52 ML/MIN/1.73 M^2
GLUCOSE SERPL-MCNC: 86 MG/DL
HCT VFR BLD AUTO: 38.4 %
HGB BLD-MCNC: 13.3 G/DL
INR PPP: 1.1
LYMPHOCYTES # BLD AUTO: 3.7 K/UL
LYMPHOCYTES NFR BLD: 45.7 %
MCH RBC QN AUTO: 32.5 PG
MCHC RBC AUTO-ENTMCNC: 34.6 %
MCV RBC AUTO: 94 FL
MONOCYTES # BLD AUTO: 0.6 K/UL
MONOCYTES NFR BLD: 6.8 %
NEUTROPHILS # BLD AUTO: 3.3 K/UL
NEUTROPHILS NFR BLD: 41.4 %
PERIPHERAL STENOSIS: ABNORMAL
PLATELET # BLD AUTO: 229 K/UL
PMV BLD AUTO: 10.9 FL
POTASSIUM SERPL-SCNC: 4 MMOL/L
PROTHROMBIN TIME: 11.3 SEC
RBC # BLD AUTO: 4.09 M/UL
SODIUM SERPL-SCNC: 136 MMOL/L
WBC # BLD AUTO: 8.07 K/UL

## 2017-07-06 PROCEDURE — 92921 CATH LAB PROCEDURE: CPT | Mod: LD,,, | Performed by: INTERNAL MEDICINE

## 2017-07-06 PROCEDURE — 25500020 PHARM REV CODE 255

## 2017-07-06 PROCEDURE — 93005 ELECTROCARDIOGRAM TRACING: CPT

## 2017-07-06 PROCEDURE — 92928 PRQ TCAT PLMT NTRAC ST 1 LES: CPT | Mod: LD,,, | Performed by: INTERNAL MEDICINE

## 2017-07-06 PROCEDURE — 36415 COLL VENOUS BLD VENIPUNCTURE: CPT

## 2017-07-06 PROCEDURE — C1769 GUIDE WIRE: HCPCS

## 2017-07-06 PROCEDURE — C1753 CATH, INTRAVAS ULTRASOUND: HCPCS

## 2017-07-06 PROCEDURE — 25000003 PHARM REV CODE 250: Performed by: INTERNAL MEDICINE

## 2017-07-06 PROCEDURE — 80048 BASIC METABOLIC PNL TOTAL CA: CPT

## 2017-07-06 PROCEDURE — 85610 PROTHROMBIN TIME: CPT

## 2017-07-06 PROCEDURE — 92978 ENDOLUMINL IVUS OCT C 1ST: CPT | Mod: 26,,, | Performed by: INTERNAL MEDICINE

## 2017-07-06 PROCEDURE — 99152 MOD SED SAME PHYS/QHP 5/>YRS: CPT | Mod: ,,, | Performed by: INTERNAL MEDICINE

## 2017-07-06 PROCEDURE — 63600175 PHARM REV CODE 636 W HCPCS

## 2017-07-06 PROCEDURE — 85025 COMPLETE CBC W/AUTO DIFF WBC: CPT

## 2017-07-06 PROCEDURE — 25000003 PHARM REV CODE 250

## 2017-07-06 RX ORDER — DIAZEPAM 5 MG/1
5 TABLET ORAL EVERY 6 HOURS PRN
Status: DISCONTINUED | OUTPATIENT
Start: 2017-07-06 | End: 2017-07-07 | Stop reason: HOSPADM

## 2017-07-06 RX ORDER — ISOSORBIDE MONONITRATE 30 MG/1
30 TABLET, EXTENDED RELEASE ORAL DAILY
Status: DISCONTINUED | OUTPATIENT
Start: 2017-07-07 | End: 2017-07-07 | Stop reason: HOSPADM

## 2017-07-06 RX ORDER — FENOFIBRATE 145 MG/1
145 TABLET, FILM COATED ORAL DAILY
Status: DISCONTINUED | OUTPATIENT
Start: 2017-07-07 | End: 2017-07-07 | Stop reason: HOSPADM

## 2017-07-06 RX ORDER — ONDANSETRON 2 MG/ML
4 INJECTION INTRAMUSCULAR; INTRAVENOUS EVERY 12 HOURS PRN
Status: DISCONTINUED | OUTPATIENT
Start: 2017-07-06 | End: 2017-07-07 | Stop reason: HOSPADM

## 2017-07-06 RX ORDER — CLOPIDOGREL BISULFATE 75 MG/1
75 TABLET ORAL DAILY
Status: DISCONTINUED | OUTPATIENT
Start: 2017-07-07 | End: 2017-07-07 | Stop reason: HOSPADM

## 2017-07-06 RX ORDER — HYDRALAZINE HYDROCHLORIDE 20 MG/ML
INJECTION INTRAMUSCULAR; INTRAVENOUS
Status: COMPLETED
Start: 2017-07-06 | End: 2017-07-06

## 2017-07-06 RX ORDER — BRIMONIDINE TARTRATE 1.5 MG/ML
1 SOLUTION/ DROPS OPHTHALMIC 2 TIMES DAILY
Status: DISCONTINUED | OUTPATIENT
Start: 2017-07-06 | End: 2017-07-07 | Stop reason: HOSPADM

## 2017-07-06 RX ORDER — OXYCODONE HYDROCHLORIDE 5 MG/1
5 TABLET ORAL EVERY 4 HOURS PRN
Status: DISCONTINUED | OUTPATIENT
Start: 2017-07-06 | End: 2017-07-07 | Stop reason: HOSPADM

## 2017-07-06 RX ORDER — EZETIMIBE 10 MG/1
10 TABLET ORAL DAILY
Status: DISCONTINUED | OUTPATIENT
Start: 2017-07-07 | End: 2017-07-07 | Stop reason: HOSPADM

## 2017-07-06 RX ORDER — OLMESARTAN MEDOXOMIL 20 MG/1
40 TABLET ORAL DAILY
Status: DISCONTINUED | OUTPATIENT
Start: 2017-07-07 | End: 2017-07-07 | Stop reason: HOSPADM

## 2017-07-06 RX ORDER — ASPIRIN 81 MG/1
81 TABLET ORAL DAILY
Status: DISCONTINUED | OUTPATIENT
Start: 2017-07-07 | End: 2017-07-07 | Stop reason: HOSPADM

## 2017-07-06 RX ORDER — SODIUM CHLORIDE 9 MG/ML
INJECTION, SOLUTION INTRAVENOUS ONCE
Status: DISCONTINUED | OUTPATIENT
Start: 2017-07-06 | End: 2017-07-06

## 2017-07-06 RX ORDER — LATANOPROST 50 UG/ML
1 SOLUTION/ DROPS OPHTHALMIC NIGHTLY
Status: DISCONTINUED | OUTPATIENT
Start: 2017-07-06 | End: 2017-07-07 | Stop reason: HOSPADM

## 2017-07-06 RX ORDER — HYDRALAZINE HYDROCHLORIDE 20 MG/ML
10 INJECTION INTRAMUSCULAR; INTRAVENOUS ONCE
Status: COMPLETED | OUTPATIENT
Start: 2017-07-06 | End: 2017-07-06

## 2017-07-06 RX ORDER — SODIUM CHLORIDE 9 MG/ML
3 INJECTION, SOLUTION INTRAVENOUS CONTINUOUS
Status: ACTIVE | OUTPATIENT
Start: 2017-07-06 | End: 2017-07-06

## 2017-07-06 RX ADMIN — LATANOPROST 1 DROP: 50 SOLUTION OPHTHALMIC at 09:07

## 2017-07-06 RX ADMIN — HYDRALAZINE HYDROCHLORIDE 10 MG: 20 INJECTION INTRAMUSCULAR; INTRAVENOUS at 04:07

## 2017-07-06 RX ADMIN — SODIUM CHLORIDE 3 ML/KG/HR: 0.9 INJECTION, SOLUTION INTRAVENOUS at 02:07

## 2017-07-06 RX ADMIN — BRIMONIDINE TARTRATE 1 DROP: 1.5 SOLUTION OPHTHALMIC at 09:07

## 2017-07-06 RX ADMIN — OXYCODONE HYDROCHLORIDE 5 MG: 5 TABLET ORAL at 09:07

## 2017-07-06 RX ADMIN — DIAZEPAM 5 MG: 5 TABLET ORAL at 09:07

## 2017-07-06 NOTE — NURSING
Pt's BP is in the 190's. Pt is not symptomatic.   Informed Dr. Ramos and he ordered a one time dose  10mg Hydralazine IV.
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
## 2017-07-06 NOTE — INTERVAL H&P NOTE
The patient has been examined and the H&P has been reviewed:    I concur with the findings and changes have been noted since the H&P was written: Patient underwent PCI of circumflex in June 2017.  Also RCA disease treated with PTCA but no stent large enough available.  Returns for PCI of RCA with bare metal stent and FFR guided PCI of LAD.    Anesthesia/Surgery risks, benefits and alternative options discussed and understood by patient/family.          There are no hospital problems to display for this patient.

## 2017-07-06 NOTE — PLAN OF CARE
Problem: Patient Care Overview  Goal: Plan of Care Review  Outcome: Ongoing (interventions implemented as appropriate)  Pt transferred from cathlab today. R groin site CDI. No swelling, bleeding or hematoma. No distress noted. Will continue to monitor.

## 2017-07-07 VITALS
DIASTOLIC BLOOD PRESSURE: 64 MMHG | HEART RATE: 70 BPM | RESPIRATION RATE: 18 BRPM | SYSTOLIC BLOOD PRESSURE: 124 MMHG | OXYGEN SATURATION: 97 % | WEIGHT: 198 LBS | HEIGHT: 72 IN | TEMPERATURE: 98 F | BODY MASS INDEX: 26.82 KG/M2

## 2017-07-07 PROBLEM — I25.10 CORONARY ARTERY DISEASE: Status: RESOLVED | Noted: 2017-06-19 | Resolved: 2017-07-07

## 2017-07-07 PROBLEM — R07.9 CHEST PAIN: Status: RESOLVED | Noted: 2017-04-04 | Resolved: 2017-07-07

## 2017-07-07 LAB
ANION GAP SERPL CALC-SCNC: 9 MMOL/L
BUN SERPL-MCNC: 20 MG/DL
CALCIUM SERPL-MCNC: 8.7 MG/DL
CHLORIDE SERPL-SCNC: 105 MMOL/L
CO2 SERPL-SCNC: 21 MMOL/L
CREAT SERPL-MCNC: 1.2 MG/DL
EST. GFR  (AFRICAN AMERICAN): >60 ML/MIN/1.73 M^2
EST. GFR  (NON AFRICAN AMERICAN): 58 ML/MIN/1.73 M^2
GLUCOSE SERPL-MCNC: 96 MG/DL
POTASSIUM SERPL-SCNC: 3.8 MMOL/L
SODIUM SERPL-SCNC: 135 MMOL/L

## 2017-07-07 PROCEDURE — 80048 BASIC METABOLIC PNL TOTAL CA: CPT

## 2017-07-07 PROCEDURE — 25000003 PHARM REV CODE 250: Performed by: INTERNAL MEDICINE

## 2017-07-07 PROCEDURE — 99217 PR OBSERVATION CARE DISCHARGE: CPT | Mod: ,,, | Performed by: INTERNAL MEDICINE

## 2017-07-07 PROCEDURE — 36415 COLL VENOUS BLD VENIPUNCTURE: CPT

## 2017-07-07 PROCEDURE — 94761 N-INVAS EAR/PLS OXIMETRY MLT: CPT

## 2017-07-07 RX ADMIN — EZETIMIBE 10 MG: 10 TABLET ORAL at 08:07

## 2017-07-07 RX ADMIN — ASPIRIN 81 MG: 81 TABLET, COATED ORAL at 08:07

## 2017-07-07 RX ADMIN — CLOPIDOGREL BISULFATE 75 MG: 75 TABLET ORAL at 08:07

## 2017-07-07 RX ADMIN — ISOSORBIDE MONONITRATE 30 MG: 30 TABLET, EXTENDED RELEASE ORAL at 08:07

## 2017-07-07 RX ADMIN — OLMESARTAN MEDOXOMIL 40 MG: 20 TABLET, FILM COATED ORAL at 08:07

## 2017-07-07 RX ADMIN — BRIMONIDINE TARTRATE 1 DROP: 1.5 SOLUTION OPHTHALMIC at 08:07

## 2017-07-07 RX ADMIN — FENOFIBRATE 145 MG: 145 TABLET ORAL at 08:07

## 2017-07-07 NOTE — PLAN OF CARE
Future Appointments  Date Time Provider Department Center   7/11/2017 2:40 PM Gene Ramos MD [facility] CARDIO [place]     Follow-up With  Details  Why  Contact Info   Gene Ramos MD  On 7/11/2017  at 2:40 pm  [address]  [address]  [place] [address]  [phone]        Noa Macedo RN  Transition Navigator  [phone]

## 2017-07-07 NOTE — PLAN OF CARE
Pt voices no discharge needs.     07/07/17 0917   Discharge Assessment   Assessment Type Discharge Planning Assessment   Confirmed/corrected address and phone number on facesheet? Yes   Assessment information obtained from? Patient   Expected Length of Stay (days) 11   Communicated expected length of stay with patient/caregiver yes   Prior to hospitilization cognitive status: Alert/Oriented   Prior to hospitalization functional status: Independent   Current cognitive status: Alert/Oriented   Current Functional Status: Independent   Arrived From home or self-care   Able to Return to Prior Arrangements yes   Is patient able to care for self after discharge? Yes   How many people do you have in your home that can help with your care after discharge? 1   Who are your caregiver(s) and their phone number(s)? Lori (spouse) [phone]   Patient's perception of discharge disposition home or selfcare   Readmission Within The Last 30 Days no previous admission in last 30 days   If YES, was patient admitted for the same reason? No   Patient currently being followed by outpatient case management? No   Patient currently receives home health services? No   Patient currently receives private duty nursing? No   Patient currently receives any other outside agency services? No   Equipment Currently Used at Home none   Do you have any problems affording any of your prescribed medications? No   Is the patient taking medications as prescribed? yes   Do you have any financial concerns preventing you from receiving the healthcare you need? No   Does the patient have transportation to healthcare appointments? Yes   Transportation Available car;family or friend will provide   On Dialysis? No   Does the patient receive services at the Coumadin Clinic? No   Are there any open cases? No   Discharge Plan A Home   Discharge Plan B Home with family

## 2017-07-07 NOTE — DISCHARGE SUMMARY
[facility]  Cardiology  Discharge Summary      Patient Name: Osorio Boggs  MRN: [id number]  Admission Date: 7/6/2017  Hospital Length of Stay: 0 days  Discharge Date and Time:  07/07/2017 11:32 AM  Attending Physician: Gene Ramos MD  Discharging Provider: Gene Ramos MD  Primary Care Physician: Keaton Adan MD    HPI: Mr. Boggs is a 76 year-old male who underwent Cardiac cath in June 2017 for CCS III angina.  3V CAD discovered.  PCI of LCX performed at that time.  Patient scheduled for elective staged PCI of RCA and LAD.    Procedure(s) (LRB):  STENT-CORONARY  PCI OF RCA  AND LAD (Right)     Indwelling Lines/Drains at time of discharge:  Lines/Drains/Airways          No matching active lines, drains, or airways          Hospital Course (synopsis of major diagnoses, care, treatment, and services provided during the course of the hospital stay): elective PCI of RCA and LAD performed unevenfully/successful on 7/8/17 by transfemoral access.  Patient observed at [facility] on telemetry.  Discharged home.  Will discontinue Imdur due to headache - evaluate on f/u for additional need for antiantinginal therapy. Patient will continue GDMT for CAD to include DAPT and statin.    Consults: None    Significant Diagnostic Studies: Labs:   BMP:   Recent Labs  Lab 07/06/17  1030 07/07/17  0730   GLU 86 96    135*   K 4.0 3.8    105   CO2 21* 21*   BUN 21 20   CREATININE 1.3 1.2   CALCIUM 9.5 8.7   , CBC   Recent Labs  Lab 07/06/17  1030   WBC 8.07   HGB 13.3*   HCT 38.4*      , INR   Lab Results   Component Value Date    INR 1.1 07/06/2017    INR 1.0 06/19/2017    INR 1.0 03/29/2011    and Lipid Panel No results found for: CHOL, HDL, LDLCALC, TRIG, CHOLHDL    Pending Diagnostic Studies:     None          Final Active Diagnoses:    Diagnosis Date Noted POA    PRINCIPAL PROBLEM:  Coronary artery disease involving native coronary artery of native heart without angina pectoris [I25.10]  07/06/2017 Yes    Hypertension [I10] 02/08/2017 Yes    Hyperlipidemia [E78.5] 02/08/2017 Yes      Problems Resolved During this Admission:    Diagnosis Date Noted Date Resolved POA       Discharged Condition: good    Follow Up:  Follow-up Information     Gene Ramos MD On 7/11/2017.    Specialties:  Cardiology, INTERVENTIONAL CARDIOLOGY  Why:  at 2:40 pm  Contact information:  [address]  [address]  [phone]                 Patient Instructions:   No discharge procedures on file.  Medications:  Reconciled Home Medications:   Current Discharge Medication List      CONTINUE these medications which have NOT CHANGED    Details   abacavir-lamivudine (EPZICOM) 600-300 mg per tablet Take 1 tablet by mouth once daily.      aspirin (ECOTRIN) 81 MG EC tablet Take 81 mg by mouth once daily.      brimonidine 0.1% (ALPHAGAN P) 0.1 % Drop Place 1 drop into both eyes 2 (two) times daily.       CHOLECALCIFEROL, VITAMIN D3, (VITAMIN D3 ORAL) Take 50,000 Units by mouth once a week.       clopidogrel (PLAVIX) 75 mg tablet Take 1 tablet (75 mg total) by mouth once daily.  Qty: 30 tablet, Refills: 11      efavirenz (SUSTIVA) 600 mg Tab Take 600 mg by mouth every evening.      EVOTAZ 300-150 mg Tab Take 1 tablet by mouth once daily.      ezetimibe (ZETIA) 10 mg tablet Take 1 tablet (10 mg total) by mouth once daily. For cholesterol  Qty: 90 tablet, Refills: 3      fenofibrate (TRICOR) 145 MG tablet Take 1 tablet by mouth once daily.      isosorbide mononitrate (IMDUR) 30 MG 24 hr tablet Take 1 tablet (30 mg total) by mouth once daily.  Qty: 30 tablet, Refills: 11      latanoprost 0.005 % ophthalmic solution 1 drop every evening.      olmesartan (BENICAR) 40 MG tablet Take 40 mg by mouth once daily.             Time spent on the discharge of patient: 20 minutes    Gene Ramos MD  Cardiology  [facility]

## 2017-07-07 NOTE — PLAN OF CARE
Problem: Patient Care Overview  Goal: Plan of Care Review  Room air SpO2   99%. Pt with no apparent distess noted. Will continue to monitor.

## 2017-07-07 NOTE — PLAN OF CARE
Problem: Patient Care Overview  Goal: Plan of Care Review  Outcome: Ongoing (interventions implemented as appropriate)  Patient resting throughout shift. Respirations even and unlabored. Skin warm,dry, intact and normal tone of ethnicity. Vital signs stable. Patient complains of generalized pain. Analgesics given as ordered. Relief reported Patient voiding independently without complications in urinal. Telemetry monitor on and audible displaying no true red alarms. All safety measures maintained. Will continue to monitor.    Problem: Fall Risk (Adult)  Goal: Absence of Falls  Patient will demonstrate the desired outcomes by discharge/transition of care.   Outcome: Ongoing (interventions implemented as appropriate)  Bed in low position and locked. Alarms on and audible. Call light within reach. Education given on preventing falls and using call light. Undersatnding verbalized

## 2017-07-07 NOTE — PLAN OF CARE
Future Appointments  Date Time Provider Department Center   7/11/2017 2:40 PM Gene Ramos MD [facility] CARDIO [place]        07/07/17 1133   Final Note   Assessment Type Final Discharge Note   Discharge Disposition Home   Discharge planning education complete? Yes   Hospital Follow Up  Appt(s) scheduled? Yes   Discharge plans and expectations educations in teach back method with documentation complete? Yes   Offered [facility] Bedside Delivery? Yes   Discharge/Hospital Encounter Summary to ([facility]) PCP No   Referral to Outpatient Case Management complete? No   Referral to / orders for Home Health Complete? No   Any social issues identified prior to discharge? No   Did you assess the readiness or willingness of the family or caregiver to support self management of care? No   Right Care Referral Info   Post Acute Recommendation No Care   \  Yeimi Linton, RN, CCM, CMSRN  RN Transition Navigator  [phone]

## 2017-07-07 NOTE — PLAN OF CARE
Problem: Patient Care Overview  Goal: Plan of Care Review  Outcome: Ongoing (interventions implemented as appropriate)  PT iv and tele box removed. Pt given education on diet, medications and follow up care. PT verbalized understanding of dc instructions. OK for pt to drive self home per cardiology. No distress noted.

## 2017-07-07 NOTE — DISCHARGE INSTRUCTIONS
Discharge Instructions for Cardiac Catheterization  Cardiac catheterization is a procedure to look for blocked areas in the blood vessels that send blood to the heart. A thin, flexible tube (catheter) is put in a blood vessel in your groin or arm. The healthcare provider injects contrast fluid into your blood, which then flows to your heart. X-rays pictures are taken of your heart. Your provider will review the results with you. Be sure to ask any questions you have before you leave. This sheet will help you take care of yourself at home.  Home care  · Only do light and easy activities for the next 2 to 3 days. Ask for help with chores and errands while you recover. Have someone drive you to your appointments.  · Don't lift anything heavy for a while. Your healthcare team will tell you when it's safe to lift again.  · Ask your healthcare team when you can expect to return to work. Unless your job involves lifting, you may be able to return to your normal activities within a couple of days.  · Take your medicines as directed. Don't skip doses.  · Drink 6 to 8 glasses of water a day. This is to help flush the contrast dye out of your body. Call your healthcare team if your urine has any change in color.  · Take your temperature each day for 7 days. If you feel cold and clammy or start sweating, take your temperature right away and call your healthcare team.  · Check your incisions every day for signs of infection. These include redness, swelling, and drainage. It is normal to have a small bruise or bump where the catheter was inserted. A bruise that is getting larger is not normal and should be reported to your healthcare team. If you see blood forming in the incision, call your healthcare team. Go to the emergency department if you have uncontrolled bleeding from the artery site. This is especially true if you take medicines that make it difficult for your blood to clot. Examples are aspirin, clopidogrel, and  warfarin.  · Eat a healthy diet. Make sure it is low in fat, salt, and cholesterol. Ask your healthcare team for diet information.  · Stop smoking. Enroll in a stop-smoking program or ask your healthcare team for help. Stop-smoking programs can be life saving.  · Exercise as your healthcare team tells you to. Your healthcare team may recommend you start a cardiac rehabilitation program. Cardiac rehab is an exercise program in which trained healthcare staff watch your progress and stress on your heart while you exercise. Ask your team how to enroll.  · Don't swim or take baths until your healthcare team says its OK. You can shower the day after the procedure. Keep the site clean and dry. This keeps the incision from getting wet and infected until the skin and artery can heal.  Follow-up care  · Make a follow-up appointment as advised by our staff. It's common to have a follow-up appointment 2 to 4 weeks after an angioplasty or coronary stent procedure.  · Make a yearly appointment, too. This is to make sure you are still doing well and not having any new symptoms.  · Don't wait for a follow-up appointment if your medicines aren't working or you are having heart-related symptoms.  When to seek medical care  Call your healthcare provider right away if you have any of the following:  · Chest pain  · Constant or increasing pain or numbness in your leg  · Fever of 100.4°F (38.0°C) or higher, or as directed by your healthcare provider  · Symptoms of infection. These include redness, swelling, drainage, or warmth at the incision site.  · Shortness of breath  · A leg that feels cold or appears blue  · Bleeding, bruising, or a lot of swelling where the catheter was inserted  · Blood in your urine  · Black or tarry stools  · Any unusual bleeding   Date Last Reviewed: 10/1/2016  © 0653-7036 [employer]. [address] [place] [address], [place] [address]. All rights reserved. This information is not intended as a  substitute for professional medical care. Always follow your healthcare professional's instructions.    CORONARY ARTERY DISEASE (CAD), UNDERSTANDING (ENGLISH) View Edit Remove  EATING HEART-HEALTHY FOODS (ENGLISH) View Edit Remove  ANGIOPLASTY, CORONARY (ENGLISH) View Edit Remove  CORONARY ANGIOPLASTY, DISCHARGE INSTRUCTIONS FOR (ENGLISH) View Edit Remove

## 2017-07-11 ENCOUNTER — OFFICE VISIT (OUTPATIENT)
Dept: CARDIOLOGY | Facility: CLINIC | Age: 77
End: 2017-07-11
Payer: MEDICARE

## 2017-07-11 VITALS
DIASTOLIC BLOOD PRESSURE: 66 MMHG | BODY MASS INDEX: 26.55 KG/M2 | HEIGHT: 72 IN | SYSTOLIC BLOOD PRESSURE: 111 MMHG | OXYGEN SATURATION: 98 % | WEIGHT: 196 LBS | HEART RATE: 63 BPM

## 2017-07-11 DIAGNOSIS — L97.912 ULCER OF RIGHT LOWER EXTREMITY WITH FAT LAYER EXPOSED: ICD-10-CM

## 2017-07-11 DIAGNOSIS — I87.8 VENOUS STASIS: ICD-10-CM

## 2017-07-11 DIAGNOSIS — I87.2 VENOUS INSUFFICIENCY: Primary | ICD-10-CM

## 2017-07-11 DIAGNOSIS — I25.10 CORONARY ARTERY DISEASE INVOLVING NATIVE CORONARY ARTERY OF NATIVE HEART WITHOUT ANGINA PECTORIS: ICD-10-CM

## 2017-07-11 DIAGNOSIS — I10 ESSENTIAL HYPERTENSION: ICD-10-CM

## 2017-07-11 DIAGNOSIS — I73.9 PAD (PERIPHERAL ARTERY DISEASE): ICD-10-CM

## 2017-07-11 DIAGNOSIS — I87.2 CHRONIC VENOUS INSUFFICIENCY: Chronic | ICD-10-CM

## 2017-07-11 DIAGNOSIS — E78.5 HYPERLIPIDEMIA, UNSPECIFIED HYPERLIPIDEMIA TYPE: ICD-10-CM

## 2017-07-11 PROCEDURE — 99214 OFFICE O/P EST MOD 30 MIN: CPT | Mod: S$GLB,,, | Performed by: INTERNAL MEDICINE

## 2017-07-11 PROCEDURE — 99499 UNLISTED E&M SERVICE: CPT | Mod: S$PBB,,, | Performed by: INTERNAL MEDICINE

## 2017-07-11 PROCEDURE — 1126F AMNT PAIN NOTED NONE PRSNT: CPT | Mod: S$GLB,,, | Performed by: INTERNAL MEDICINE

## 2017-07-11 PROCEDURE — 99999 PR PBB SHADOW E&M-EST. PATIENT-LVL III: CPT | Mod: PBBFAC,,, | Performed by: INTERNAL MEDICINE

## 2017-07-11 PROCEDURE — 1159F MED LIST DOCD IN RCRD: CPT | Mod: S$GLB,,, | Performed by: INTERNAL MEDICINE
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
## 2017-07-12 NOTE — ASSESSMENT & PLAN NOTE
PCI LAD July 2017:  2.75 x 22mm  R-ZES  PCI RCA July 2017:  5.0 x 28mm ML Ultra BMS    PCI LCX June 2017:  3.0 x 15mm R-ZES; 2.25 x 22mm R-ZES    ETT March 2017:    3.6 minutes JAZMIN  DTS -4:  1-2 mm ST depression

## 2017-07-17 LAB
POC ACTIVATED CLOTTING TIME K: 224 SEC (ref 74–137)
POC ACTIVATED CLOTTING TIME K: 252 SEC (ref 74–137)
POC ACTIVATED CLOTTING TIME K: 252 SEC (ref 74–137)
SAMPLE: ABNORMAL

## 2017-08-11 ENCOUNTER — TELEPHONE (OUTPATIENT)
Dept: CARDIOLOGY | Facility: CLINIC | Age: 77
End: 2017-08-11

## 2017-08-11 NOTE — TELEPHONE ENCOUNTER
----- Message from Zahra Messina sent at 8/10/2017 10:34 AM CDT -----  Contact: self  Pt is taking two medications  Clopidogrel and Isosorbide.  He states that when he stands he has to pause for a minute before getting going. Pt thinks that it may be caused by these meds.  Please call pt tp discuss [phone]

## 2017-08-11 NOTE — TELEPHONE ENCOUNTER
Spoke with patient.  Taking clopidogrel 75 mg daily and isosorbide mono 30 mg every morning.  Complains of lightheaded/dizziness upon standing, he has to standstill to keep a steady gait.  Asked patient to take start taking medications at bedtime, I will call him on Monday with further recommendations.  Is there any alternative?  Please advise.

## 2017-08-14 NOTE — TELEPHONE ENCOUNTER
Called patient with recommendations, since taking medications (plavix and imdur) at bedtime, symptoms have lessen, but still there.  Patient stated he will continue this regimen and will call back if he discontinues imdur.

## 2017-10-03 ENCOUNTER — OFFICE VISIT (OUTPATIENT)
Dept: CARDIOLOGY | Facility: CLINIC | Age: 77
End: 2017-10-03
Payer: MEDICARE

## 2017-10-03 VITALS
DIASTOLIC BLOOD PRESSURE: 70 MMHG | HEART RATE: 64 BPM | WEIGHT: 195.38 LBS | HEIGHT: 72 IN | OXYGEN SATURATION: 98 % | SYSTOLIC BLOOD PRESSURE: 130 MMHG | BODY MASS INDEX: 26.46 KG/M2

## 2017-10-03 DIAGNOSIS — I10 ESSENTIAL HYPERTENSION: ICD-10-CM

## 2017-10-03 DIAGNOSIS — E78.5 HYPERLIPIDEMIA, UNSPECIFIED HYPERLIPIDEMIA TYPE: ICD-10-CM

## 2017-10-03 DIAGNOSIS — I87.8 VENOUS STASIS: ICD-10-CM

## 2017-10-03 DIAGNOSIS — Z78.9 STATIN INTOLERANCE: Chronic | ICD-10-CM

## 2017-10-03 DIAGNOSIS — I73.9 PAD (PERIPHERAL ARTERY DISEASE): ICD-10-CM

## 2017-10-03 DIAGNOSIS — I87.2 CHRONIC VENOUS INSUFFICIENCY: Chronic | ICD-10-CM

## 2017-10-03 DIAGNOSIS — I25.10 CORONARY ARTERY DISEASE INVOLVING NATIVE CORONARY ARTERY OF NATIVE HEART WITHOUT ANGINA PECTORIS: Primary | ICD-10-CM

## 2017-10-03 PROCEDURE — 99999 PR PBB SHADOW E&M-EST. PATIENT-LVL III: CPT | Mod: PBBFAC,,, | Performed by: INTERNAL MEDICINE

## 2017-10-03 PROCEDURE — 99215 OFFICE O/P EST HI 40 MIN: CPT | Mod: S$GLB,,, | Performed by: INTERNAL MEDICINE

## 2017-10-03 PROCEDURE — 99499 UNLISTED E&M SERVICE: CPT | Mod: S$PBB,,, | Performed by: INTERNAL MEDICINE

## 2017-10-03 NOTE — PATIENT INSTRUCTIONS
1.  Get your blood tested for cholesterol.  YOu should fast.    2.  You may benefit from a shot (Repatha) for treating your cholesterol and heart disease.  This would replace Zetia and Fenofibrate.        Geen Ramos MD, FACC, CCDS  Interventional Cardiology  [facility]
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
## 2017-10-10 ENCOUNTER — CLINICAL SUPPORT (OUTPATIENT)
Dept: FAMILY MEDICINE | Facility: CLINIC | Age: 77
End: 2017-10-10
Payer: MEDICARE

## 2017-10-10 DIAGNOSIS — Z23 NEED FOR IMMUNIZATION AGAINST INFLUENZA: Primary | ICD-10-CM

## 2017-10-10 PROCEDURE — 90662 IIV NO PRSV INCREASED AG IM: CPT | Mod: S$GLB,,, | Performed by: FAMILY MEDICINE

## 2017-10-10 PROCEDURE — G0008 ADMIN INFLUENZA VIRUS VAC: HCPCS | Mod: S$GLB,,, | Performed by: FAMILY MEDICINE

## 2017-10-12 ENCOUNTER — TELEPHONE (OUTPATIENT)
Dept: CARDIOLOGY | Facility: CLINIC | Age: 77
End: 2017-10-12

## 2017-10-12 NOTE — TELEPHONE ENCOUNTER
Returned call, spoke with patient.  Confirmed lab appointment scheduled for 10/17 in [place].  Confirmed.  Reminded patient to fast, NPO after midnight.  Voiced understanding.

## 2017-10-12 NOTE — TELEPHONE ENCOUNTER
----- Message from Dennys Nicole sent at 10/12/2017  1:15 PM CDT -----  Contact: [phone]/self  Pt requesting to speak with the nurse only concerning an upcoming appointment.  Please call and advise

## 2017-10-17 ENCOUNTER — LAB VISIT (OUTPATIENT)
Dept: LAB | Facility: HOSPITAL | Age: 77
End: 2017-10-17
Attending: INTERNAL MEDICINE
Payer: MEDICARE

## 2017-10-17 DIAGNOSIS — I25.10 CORONARY ARTERY DISEASE INVOLVING NATIVE CORONARY ARTERY OF NATIVE HEART WITHOUT ANGINA PECTORIS: ICD-10-CM

## 2017-10-17 DIAGNOSIS — E78.5 HYPERLIPIDEMIA, UNSPECIFIED HYPERLIPIDEMIA TYPE: ICD-10-CM

## 2017-10-17 LAB
ANION GAP SERPL CALC-SCNC: 11 MMOL/L
BUN SERPL-MCNC: 26 MG/DL
CALCIUM SERPL-MCNC: 9.5 MG/DL
CHLORIDE SERPL-SCNC: 106 MMOL/L
CHOLEST SERPL-MCNC: 238 MG/DL
CHOLEST/HDLC SERPL: 3.6 {RATIO}
CO2 SERPL-SCNC: 21 MMOL/L
CREAT SERPL-MCNC: 1.57 MG/DL
EST. GFR  (AFRICAN AMERICAN): 47.8 ML/MIN/1.73 M^2
EST. GFR  (NON AFRICAN AMERICAN): 41.3 ML/MIN/1.73 M^2
GLUCOSE SERPL-MCNC: 75 MG/DL
HDLC SERPL-MCNC: 67 MG/DL
HDLC SERPL: 28.2 %
LDLC SERPL CALC-MCNC: 160.2 MG/DL
NONHDLC SERPL-MCNC: 171 MG/DL
POTASSIUM SERPL-SCNC: 4.8 MMOL/L
SODIUM SERPL-SCNC: 138 MMOL/L
TRIGL SERPL-MCNC: 54 MG/DL

## 2017-10-17 PROCEDURE — 80048 BASIC METABOLIC PNL TOTAL CA: CPT | Mod: PO

## 2017-10-17 PROCEDURE — 80061 LIPID PANEL: CPT

## 2017-10-17 PROCEDURE — 36415 COLL VENOUS BLD VENIPUNCTURE: CPT | Mod: PO

## 2017-10-18 ENCOUNTER — PATIENT MESSAGE (OUTPATIENT)
Dept: CARDIOLOGY | Facility: CLINIC | Age: 77
End: 2017-10-18

## 2017-10-20 ENCOUNTER — TELEPHONE (OUTPATIENT)
Dept: CARDIOLOGY | Facility: CLINIC | Age: 77
End: 2017-10-20

## 2017-10-20 NOTE — TELEPHONE ENCOUNTER
----- Message from Gemma Ontiveros sent at 10/20/2017  9:07 AM CDT -----  Contact: [phone]/self  Patient has questions concerning medication prescribed for him. Please call and advise.

## 2017-10-20 NOTE — TELEPHONE ENCOUNTER
Returned call, spoke with patient.  Misplaced information regarding repatha and requested information again.  Verbal given.  Patient stated he is ready to proceed with process of Repatha prescription

## 2017-10-24 DIAGNOSIS — I25.10 CORONARY ARTERY DISEASE INVOLVING NATIVE CORONARY ARTERY OF NATIVE HEART WITHOUT ANGINA PECTORIS: Primary | ICD-10-CM

## 2017-10-25 ENCOUNTER — TELEPHONE (OUTPATIENT)
Dept: PHARMACY | Facility: CLINIC | Age: 77
End: 2017-10-25

## 2017-10-25 NOTE — TELEPHONE ENCOUNTER
[facility] received a prescription for Repatha and it will require a prior authorization with their insurance company. We will update patient of status as more information is received.

## 2017-10-26 ENCOUNTER — TELEPHONE (OUTPATIENT)
Dept: CARDIOLOGY | Facility: CLINIC | Age: 77
End: 2017-10-26

## 2017-10-26 NOTE — TELEPHONE ENCOUNTER
----- Message from Dennys Nicole sent at 10/26/2017  4:33 PM CDT -----  Contact: Cristela dugan/[employer]  [phone]  Cristela requesting to speak with you concerning repatha PA   Please call and advise

## 2017-10-26 NOTE — TELEPHONE ENCOUNTER
Spoke with patient.  Myopathy during treatment with atorvastatin lasting approximately greater than 2 weeks and interfered with ADL's.    Contacted Kadie at [facility] to inform.  She will finalize PA by tomorrow evening.

## 2017-10-26 NOTE — TELEPHONE ENCOUNTER
Documentation Only:    Faxed Prior Authorization form and supporting documentation for Repatha to insurance on 10/26/2017

## 2017-10-26 NOTE — TELEPHONE ENCOUNTER
Returned call, spoke with Cristela.  Questioning if myopathy due to taking atorvastatin in the past lasted more than 2 weeks.  Informed I would call back after speaking with patient.  Voiced understanding.

## 2017-10-27 NOTE — TELEPHONE ENCOUNTER
Documentation Only:    Prior Authorization for Repatha has been approved for one year    Approval dates:  10/27/2017 through 10/26/2018    Patient co-pay:  $0.00

## 2017-11-28 NOTE — TELEPHONE ENCOUNTER
Initial Repatha 140mg/ml Sureclick Pens consult completed on 17 . Repatha 140mg/ml Sureclick Pens will be shipped on 17 to arrive at patient's home on 17 via [facility]. $ 0 copay (004). Patient will start Repatha 140mg/ml Sureclick Pens  on  17 - pending  RepathaReady nursing visit. Address confirmed - no signature requirement requested. Confirmed 2 patient identifiers - name and . Therapy Appropriate.  --Injection experience: none, but wife used insulin pens.    Counseled patient on administration directions:  - Inject Repatha 140mg/ml Sureclick Pens into the skin every 14 days.   - Take out of the refrigerator 30-60 minutes prior to injection.  - Wash hands before and after injection.  - Monthly RX will come with gauze, bandaids, and alcohol swabs.  - Patient may inject in either the tops of the thighs, abdomen- but at least 2 inches away from her belly button, or the outer part of her upper arm.  Patient was instructed to rotate injections sites.  - Patient is to wipe down the injection site with the alcohol pad, wait to dry.  Gently squeeze the area of the cleaned skin and hold it firmly.   Place the pen flat against the raised area of skin that is being squeezed, then push down on the button and release,  in 10-15 seconds you will hear a click and the window will go from from clear to yellow, indicating injection is complete.  - Patient should rotate injection sites.   - Patient will use sharps container; once full, per [place] law, she/ he may lock the sharps container and place in her trash. She/ he can then contact the Pharmacy and we will replace the sharps at no additional charge.    Patient was counseled on possible side effects:  - Injection site reaction: redness, soreness, itching, bruising, which should resolve within 3-5 days.  - flu-like symptoms    Patient understands to continue ALL medications until further notice, Repatha being added to regimen.     Patient did not have any  further questions. She was advised to keep a calendar to stay compliant.  Consultation included: indication; goals of treatment; administration; storage and handling; side effects; how to handle side effects; the importance of compliance; how to handle missed doses; the importance of laboratory monitoring; the importance of keeping all follow up appointments.  Patient understands to report any medication changes to [facility] and provider. All questions answered and addressed to patients satisfaction. I will f/u with her in 1 week from start, [facility] to contact patient in 3 weeks for refills.

## 2017-12-05 ENCOUNTER — PATIENT MESSAGE (OUTPATIENT)
Dept: RESEARCH | Facility: HOSPITAL | Age: 77
End: 2017-12-05

## 2017-12-07 ENCOUNTER — TELEPHONE (OUTPATIENT)
Dept: CARDIOLOGY | Facility: CLINIC | Age: 77
End: 2017-12-07

## 2017-12-07 NOTE — TELEPHONE ENCOUNTER
Repatha Sureclick follow-up. Confirmed 2 patient identifiers - name and . Start date confirmed - 17, Ursula nurse came out today for 1st dose. No side effects or missed doses reported. Patient confirms dosing, no difficulties with administration. He/she confirms no changes to insurance or health and has no further questions at this time.  All questions answered and addressed to patients satisfaction. [facility] will continue to reach out to patient monthly to arrange refills.

## 2017-12-07 NOTE — TELEPHONE ENCOUNTER
----- Message from Zahra Messina sent at 12/7/2017  1:24 PM CST -----  Contact: Taryn Velásquez [facility] nurse [phone]  The Repatha nurse came in the office to tell us Mr Boggs had his first injection today.  He on schedule to receive two injections a month his next one should be Dec 21st. And he should have f/u labs mid January.  He's on the 140 sure click.   I scanned her business card in Mr. Boggs media.

## 2017-12-07 NOTE — TELEPHONE ENCOUNTER
----- Message from Helen Clemens sent at 12/7/2017  1:12 PM CST -----  Contact: [phone] self  Patient called in requesting to speak with you. Patient prefers to speak with a nurse. Please advise.

## 2017-12-18 ENCOUNTER — TELEPHONE (OUTPATIENT)
Dept: CARDIOLOGY | Facility: CLINIC | Age: 77
End: 2017-12-18

## 2017-12-18 NOTE — TELEPHONE ENCOUNTER
"Repatha Sureclick follow-up. Confirmed 2 patient identifiers - name and . Start date confirmed - 17, next dose due 17. No side effects or missed doses reported. He noticed a "burning with in the bone" of his right ankle that occurred once where it woke him in the night, but it has since gone away. He will continue to monitor to see if it returns and if it is related to Repatha doses. Patient confirms dosing, no difficulties with administration. His second pen is being stored outside at room temperature b/c [facility]. He understands it must be used within 30 days. He/she confirms no changes to insurance or health and has no further questions at this time.  All questions answered and addressed to patients satisfaction. [facility] will continue to reach out to patient monthly to arrange refills.  "

## 2017-12-18 NOTE — TELEPHONE ENCOUNTER
----- Message from Vadim Hwang sent at 12/18/2017 12:42 PM CST -----  Contact: PT   Pt would like a call back regarding scheduling appointment Pt insist on speaking  with nurse Barahona to schedule       Pt can be reached at [phone] or [phone]

## 2017-12-21 ENCOUNTER — TELEPHONE (OUTPATIENT)
Dept: CARDIOLOGY | Facility: CLINIC | Age: 77
End: 2017-12-21

## 2017-12-21 RX ORDER — EZETIMIBE 10 MG/1
TABLET ORAL
Qty: 90 TABLET | Refills: 3 | Status: SHIPPED | OUTPATIENT
Start: 2017-12-21 | End: 2017-12-22 | Stop reason: SDUPTHER

## 2017-12-21 NOTE — TELEPHONE ENCOUNTER
----- Message from Therese Lucas sent at 12/21/2017  2:22 PM CST -----  Contact: self, [phone]  Patient requests to speak with you regarding letter he received and his doctor leaving.  Please advise.

## 2017-12-21 NOTE — TELEPHONE ENCOUNTER
Patient reports that when he went to administer his dose of Repatha today, there was a ~10 second delay before the 1st click went off. Luckily he held the device in place and the dose administered successfully. He was given Ursula's # to report malfunction to . He understands NOT to take another dose until his next scheduled time. He also reports cough, sneezing - directed to take an antihistamine if needed. He states they mostly have gone away. [facility] will be in touch next week to arrange his next refill. TTN

## 2017-12-22 RX ORDER — EZETIMIBE 10 MG/1
10 TABLET ORAL DAILY
Qty: 90 TABLET | Refills: 3 | Status: SHIPPED | OUTPATIENT
Start: 2017-12-22 | End: 2018-01-02 | Stop reason: ALTCHOICE

## 2017-12-22 NOTE — TELEPHONE ENCOUNTER
Please send to [facility]    Remove  ezetimibe (ZETIA) 10 mg tablet  Take 1 tablet (10 mg total) by mouth once daily.   Normal, Disp-90 tablet, R-3

## 2017-12-27 ENCOUNTER — TELEPHONE (OUTPATIENT)
Dept: PHARMACY | Facility: CLINIC | Age: 77
End: 2017-12-27

## 2018-01-02 ENCOUNTER — OFFICE VISIT (OUTPATIENT)
Dept: CARDIOLOGY | Facility: CLINIC | Age: 78
End: 2018-01-02
Payer: MEDICARE

## 2018-01-02 VITALS
BODY MASS INDEX: 26.28 KG/M2 | DIASTOLIC BLOOD PRESSURE: 69 MMHG | WEIGHT: 194 LBS | SYSTOLIC BLOOD PRESSURE: 139 MMHG | HEART RATE: 64 BPM | HEIGHT: 72 IN

## 2018-01-02 DIAGNOSIS — I10 ESSENTIAL HYPERTENSION: ICD-10-CM

## 2018-01-02 DIAGNOSIS — I25.10 CORONARY ARTERY DISEASE INVOLVING NATIVE CORONARY ARTERY OF NATIVE HEART WITHOUT ANGINA PECTORIS: Primary | ICD-10-CM

## 2018-01-02 DIAGNOSIS — L97.912 ULCER OF RIGHT LOWER EXTREMITY WITH FAT LAYER EXPOSED: ICD-10-CM

## 2018-01-02 DIAGNOSIS — Z78.9 STATIN INTOLERANCE: Chronic | ICD-10-CM

## 2018-01-02 DIAGNOSIS — E78.5 HYPERLIPIDEMIA, UNSPECIFIED HYPERLIPIDEMIA TYPE: ICD-10-CM

## 2018-01-02 DIAGNOSIS — I87.2 CHRONIC VENOUS INSUFFICIENCY: Chronic | ICD-10-CM

## 2018-01-02 DIAGNOSIS — I87.8 VENOUS STASIS: ICD-10-CM

## 2018-01-02 DIAGNOSIS — Z21 HIV POSITIVE: ICD-10-CM

## 2018-01-02 PROCEDURE — 99215 OFFICE O/P EST HI 40 MIN: CPT | Mod: S$GLB,,, | Performed by: INTERNAL MEDICINE

## 2018-01-02 PROCEDURE — 99999 PR PBB SHADOW E&M-EST. PATIENT-LVL III: CPT | Mod: PBBFAC,,, | Performed by: INTERNAL MEDICINE

## 2018-01-02 PROCEDURE — 99499 UNLISTED E&M SERVICE: CPT | Mod: S$GLB,,, | Performed by: INTERNAL MEDICINE

## 2018-01-02 NOTE — PATIENT INSTRUCTIONS
1.  Stop taking the following medications:    -Ezetimibe (Zetia)  -Fenofibrate (Tricor)  -Isosorbide Mononitrate (Imdur)        Gene Ramos MD, FACC, CCDS  Interventional Cardiology  [facility]
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
## 2018-01-19 ENCOUNTER — TELEPHONE (OUTPATIENT)
Dept: PHARMACY | Facility: CLINIC | Age: 78
End: 2018-01-19

## 2018-02-15 ENCOUNTER — TELEPHONE (OUTPATIENT)
Dept: PHARMACY | Facility: CLINIC | Age: 78
End: 2018-02-15

## 2018-02-15 NOTE — TELEPHONE ENCOUNTER
Good afternoon,    When Mr. Ac was reached regarding his Repatha refill he had a number of questions and concerns regarding it and he would like to be seen in office as soon as possible.     Since starting the Repatha in December he has noted back pain, left side pain when he inhales (lasted > 2 weeks), weight loss (not rapid), and swelling of the ankles that has become noticeably worse. He stated he is currently transitioning between provider due to his previous provider moving to [place], but would like to be under Dr. Metzger's care.     He was advised should any of these issues progress to report to the Emergency Room. Currently no issues with breathing or pain. He is wearing a vest, which helps with back pain. Unable to say if back pain or exhaustion could be due to any other elements.     If your office could please reach out to him and schedule appointment as appropriate.     Thanks,  Maricruz Couch, PharmD  [facility]- Clinical Pharmacist  [phone]

## 2018-02-21 ENCOUNTER — OFFICE VISIT (OUTPATIENT)
Dept: CARDIOLOGY | Facility: CLINIC | Age: 78
End: 2018-02-21
Payer: MEDICARE

## 2018-02-21 VITALS
DIASTOLIC BLOOD PRESSURE: 70 MMHG | WEIGHT: 204 LBS | HEART RATE: 64 BPM | BODY MASS INDEX: 27.63 KG/M2 | HEIGHT: 72 IN | SYSTOLIC BLOOD PRESSURE: 130 MMHG

## 2018-02-21 DIAGNOSIS — E78.2 MIXED HYPERLIPIDEMIA: ICD-10-CM

## 2018-02-21 DIAGNOSIS — I73.9 PAD (PERIPHERAL ARTERY DISEASE): ICD-10-CM

## 2018-02-21 DIAGNOSIS — I25.10 CORONARY ARTERY DISEASE INVOLVING NATIVE CORONARY ARTERY OF NATIVE HEART WITHOUT ANGINA PECTORIS: ICD-10-CM

## 2018-02-21 DIAGNOSIS — I87.2 CHRONIC VENOUS INSUFFICIENCY: Primary | Chronic | ICD-10-CM

## 2018-02-21 DIAGNOSIS — Z21 HIV POSITIVE: ICD-10-CM

## 2018-02-21 DIAGNOSIS — I10 ESSENTIAL HYPERTENSION: ICD-10-CM

## 2018-02-21 PROCEDURE — 99999 PR PBB SHADOW E&M-EST. PATIENT-LVL III: CPT | Mod: PBBFAC,,, | Performed by: INTERNAL MEDICINE

## 2018-02-21 PROCEDURE — 1126F AMNT PAIN NOTED NONE PRSNT: CPT | Mod: S$GLB,,, | Performed by: INTERNAL MEDICINE

## 2018-02-21 PROCEDURE — 99499 UNLISTED E&M SERVICE: CPT | Mod: S$GLB,,, | Performed by: INTERNAL MEDICINE

## 2018-02-21 PROCEDURE — 1159F MED LIST DOCD IN RCRD: CPT | Mod: S$GLB,,, | Performed by: INTERNAL MEDICINE

## 2018-02-21 PROCEDURE — 3008F BODY MASS INDEX DOCD: CPT | Mod: S$GLB,,, | Performed by: INTERNAL MEDICINE

## 2018-02-21 PROCEDURE — 99214 OFFICE O/P EST MOD 30 MIN: CPT | Mod: S$GLB,,, | Performed by: INTERNAL MEDICINE

## 2018-02-21 NOTE — PATIENT INSTRUCTIONS
Heart Disease Education    The heart beats 60 to 100 times per minute, 24 hours a day. This equals almost 1000,000 times a day. It pumps blood with oxygen and nutrients to the tissues and organs of the body. But the heart is a muscle and needs its own supply of blood. Blood flow to the heart is supplied by the coronary arteries. Coronary artery disease (atherosclerosis) is a result of cholesterol, saturated fat, and calcium deposits (plaques) that build up inside the walls. This causes inflammation within the coronary arteries. These plaques narrow the artery and reduce blood flow to the heart muscle. The reduction in blood flow to the heart muscle decreases oxygen supply to the heart. If the narrowing is significant enough, the oxygen supply to one or more regions of the heart can be temporarily or permanently shut down. This can cause chest pain, and possibly death of heart tissue (heart attack).  Types of chest pain  Angina is the name for pain in the heart muscle. Angina is a warning sign of serious heart disease. When untreated it can lead to a heart attack, also known as acute myocardial infarction, or AMI. Angina occurs when there is not enough blood and oxygen flowing to the heart for the amount of work it is doing. This most often happens during physical exertion, when the heart is working hardest. It is usually relieved by rest or nitroglycerin. Angina may also occur after a large meal when extra blood is sent to the digestive organs and less goes to the heart. In the case of advanced or unstable heart disease, angina can occur at rest or awaken you from sleep. Angina usually lasts from a few minutes up to 20 minutes or more. When treated early, the effects of angina can be reversed without permanent damage to the heart. Angina is a serious condition and needs to be evaluated by a medical professional immediately.  There are two types of angina -- stable and unstable:  · Stable angina usually occurs  with a predictable level of activity. Being stable, its character, severity, and occurrence do not change much over time. It usually starts with activity, and resolves with rest or taking your medicine as instructed by your doctor. The symptoms usually do not last long.  · Unstable angina changes or gets worse over time. It is different from whatever you are used to. It may feel different or worse, begin without cause, occur with exercise or exertion, wake you up from sleep, and last longer. It may not respond in the same way as it does when you take your usual medicines for an attack. This type of angina can be a warning sign of an impending heart attack.     A heart attack is usually the result of a blood clot that suddenly forms in a coronary artery that has been narrowed with plaque. When this occurs, blood flow may be cut off to a part of the heart muscle, causing the cells to die. This weakens the pumping action of the heart, which affects the delivery of blood to all the other organs in the body including the brain. This damage is not reversible. However, early treatment can limit the amount of damage.  The pain you feel with angina and a heart attack may have a similar quality. However, it is usually different in intensity and duration. Here are some typical descriptions of a heart attack:  · It is most often experienced as a squeezing, crushing, pressure-like sensation in the center of the chest.  · It is sometimes described as something heavy sitting on my chest.  · It may feel more like a bad case of indigestion.  · The pain may spread from the chest to the arm, shoulder, throat or jaw.  · Sometimes the pain is not felt in the chest at all, but only in the arm, shoulder, throat or jaw.  · There may also be nausea, vomiting, dizziness or light-headedness, sweating and trouble breathing.  · Palpitations, or your heart beating rapidly  · A new, irregular heart beat  · Unexplained weakness  You may not be  "able to tell the difference between "bad" angina and a heart attack at home. Seek help if your symptoms are different than usual. Do not be in denial or just try to "tough it out."  Call 911  This is the fastest and safest way to get to the emergency department. The paramedics can also start treatment on the way to the hospital, saving valuable time for your heart.  · If the angina gets worse, if it continues, or if it stops and returns, call 911 immediately. Do not delay. You may be having a heart attack.  · After you call 911, take a second tablet or spray unless instructed otherwise. When repeating doses, sit down if possible, because it can make you feel lightheaded or dizzy. Wait another 5 minutes. If the angina still does not go away, take a third tablet or spray. Do not take more than 3 tablets or sprays within 15 minutes. Stay on the phone with 911 for further instruction.  · Your healthcare provider may give you slightly different instructions than those above. If so, follow them carefully.  Do not wait until symptoms become severe to call 911.  Other reasons to call 911 include:  · Trouble breathing  · Feeling lightheaded, faint, or dizzy  · Rapid heart beat  · Slower than usual heart rate compared to your normal  · Angina with weakness, dizziness, fainting, heavy sweating, nausea, or vomiting  · Extreme drowsiness, confusion  · Weakness of an arm or leg or one side of the face  · Difficulty with speech or vision  When to seek medical care  Remember, the signs and symptoms of a heart attack are not always like they are on TV. Sometimes they are not so obvious. You may only feel weak, or just not right. If it is not clear or if you have any doubt, call for advice.  · Seek help if there is a change in the type of pain, if it feels different, or if your symptoms are mild.  · Do not drive yourself. Have someone else drive you. If no one can drive, call 911.  · Do not delay. Fast diagnosis and treatment can " "prevent or limit the amount of heart damage during a heart attack.  · Do not go to your doctor's office or a clinic as they may not be able to provide all the testing and treatment required for this condition.  · If your doctor has given you medicine to take when symptoms occur, take them but don't delay getting help trying to locate medicines.  What happens in the emergency department  The emergency department is connected to your local emergency medical system (EMS) through 911. That's why during a cardiac emergency, calling 911 is the fastest way to get help. The goal of the emergency department is to rapidly screen, evaluate, and treat people.  Once you are there, an electrocardiogram (ECG or heart tracing) will be done. Blood samples may be taken to look for the presence of heart enzymes that leak from damaged heart cells and show if a heart attack is occurring. You will often be evaluated by a heart specialist (cardiologist) who decides the best course of action. In the case of severe angina or early heart attack, and depending on the circumstances, powerful "clot busting" medicines can be used to dissolve blood clots in the coronary artery. In other cases, you may be taken to a cardiac catheterization lab. Here, a tiny balloon-tipped catheter is advanced through blood vessels to the heart. There the balloon is inflated pushing open the blood vessel restoring blood flow.  Risk factors for heart disease  Risk factors for heart disease are a combination of genetic and lifestyle. Many risk factors work by either directly or indirectly damaging the blood vessels of the heart, or by increasing the risk of forming blood or cholesterol clots, which then clog up and block the arteries.     Examples of physical lifestyle risk factors:  · Cigarette smoking  · High blood pressure  · High blood cholesterol  · Use of stimulant drugs such as cocaine, crack, and amphetamines  · Eating a high-fat, high-cholesterol " meal  · Diabetes   · Obesity which increases risk for diabetes and high blood pressure  · Lack of regular physical activity     Examples of emotional lifestyle factors:  · Chronic high stress levels release stress hormones. These raise blood pressure and cholesterol level and makes blood clot more easily.  · Held-in anger, hostile or cynical attitude  · Social and emotional isolation, lack of intimacy  · Loss of relationship  · Depression  Other factors that increase the risk of heart attack that you cannot control :  · Age. The older you get beyond 40, the greater is your risk of significant coronary artery disease.  · Gender. More men than women get heart disease; but once past menopause, women who are not taking estrogen replacement have the same risk as men for a heart attack.  · Family history. If your mother, father, brother or sister has coronary artery disease, your risk of having it is higher than a person your age without this family history.  What can you do to decrease your risk  To reduce your risk of heart disease:  · Get regular checkups with your doctor.  · Take your medicines for blood pressure, cholesterol or diabetes as directed.  · Watch your diet. Eat a heart healthy diet choosing fresh foods, less salt, cholesterol, and fat  · Stop smoking. Get help if needed.  · Get regular exercise.  · Manage stress.  · Carry a list of medicines and doses in your wallet.  Date Last Reviewed: 12/30/2015  © 3466-3961 [employer]. [address] [place] [address], [place] [address]. All rights reserved. This information is not intended as a substitute for professional medical care. Always follow your healthcare professional's instructions.

## 2018-02-21 NOTE — PROGRESS NOTES
Subjective:   Patient ID:  Osorio Boggs is a 77 y.o. male who presents for follow up of Coronary Artery Disease; Hyperlipidemia; Hypertension; s/p PCI; and Venous Insufficiency      HPI:       He is here to establish care after Dr. Ramos departure. He has CAD s/p PCI LAD, LCX, and RCA (2017)-CTS turned down for CABG, HTN, HLP, HIV, PAD-no claudication, and venous insufficiency presenting with complaints of recurrent leg edema, L >> R. R leg ulcers healed with wound care. He wears compression stockings and exercises regularly.       He was experiencing CCS III angina, and Dr. Adan order a stress test, which is abnormal with intermediate risk result.  Underwent cardiac cath in June - 3V CAD.  PCI of LAD and PTCA of RCA performed.  STill with LCX disease and RCA disease not stented due to insufficient stent size for RCA.  Returned for PCI of proximal RCA with BMS and DONIS of proximal LAD. Symptoms resolved.           Arterial U/S indicated monophasic wave forms of RCFA and some abnormal waveforms in popliteal system of RLE. Patient denies claudication symptoms.            Venous ultrasound 2/2017     R GSV 4 mm without reflux   R LSV 5 mm with 1168 msec reflux       L GSV 6 mm without reflux   L LSV 5 mm with 860 msec reflux                         Patient Active Problem List    Diagnosis Date Noted    Statin intolerance 10/03/2017    Coronary artery disease involving native coronary artery of native heart without angina pectoris 07/06/2017    Azotemia 03/20/2017    Chronic venous insufficiency 03/14/2017       Venous ultrasound 2/2017     R GSV 4 mm without reflux   R LSV 5 mm with 1168 msec reflux       L GSV 6 mm without reflux   L LSV 5 mm with 860 msec reflux          HIV positive 02/09/2017    PAD (peripheral artery disease) 02/09/2017    Hypertension 02/08/2017    Hyperlipidemia 02/08/2017    Ulcer of right lower extremity with fat layer exposed 02/08/2017    Venous stasis 02/08/2017    Glaucoma  2015           Right Arm BP - Sittin/70  Left Arm BP - Sittin/70        LABS    Lipid panel  Lab Results   Component Value Date    CHOL 238 (H) 10/17/2017     Lab Results   Component Value Date    HDL 67 10/17/2017     Lab Results   Component Value Date    LDLCALC 160.2 (H) 10/17/2017     Lab Results   Component Value Date    TRIG 54 10/17/2017     Lab Results   Component Value Date    CHOLHDL 28.2 10/17/2017            Review of Systems   Constitution: Negative for diaphoresis, weakness, night sweats, weight gain and weight loss.   HENT: Negative for congestion.    Eyes: Negative for blurred vision, discharge and double vision.   Cardiovascular: Positive for leg swelling. Negative for chest pain, claudication, cyanosis, dyspnea on exertion, irregular heartbeat, near-syncope, orthopnea, palpitations, paroxysmal nocturnal dyspnea and syncope.   Respiratory: Negative for cough, shortness of breath and wheezing.    Endocrine: Negative for cold intolerance, heat intolerance and polyphagia.   Hematologic/Lymphatic: Negative for adenopathy and bleeding problem. Does not bruise/bleed easily.   Skin: Negative for dry skin and nail changes.   Musculoskeletal: Negative for arthritis, back pain, falls, joint pain, myalgias and neck pain.   Gastrointestinal: Negative for bloating, abdominal pain, change in bowel habit and constipation.   Genitourinary: Negative for bladder incontinence, dysuria, flank pain, genital sores and missed menses.   Neurological: Negative for aphonia, brief paralysis, difficulty with concentration and dizziness.   Psychiatric/Behavioral: Negative for altered mental status and memory loss. The patient does not have insomnia.    Allergic/Immunologic: Negative for environmental allergies.       Objective:   Physical Exam   Constitutional: He is oriented to person, place, and time. He appears well-developed and well-nourished. He is not intubated.   HENT:   Head: Normocephalic and  atraumatic.   Right Ear: External ear normal.   Left Ear: External ear normal.   Mouth/Throat: Oropharynx is clear and moist.   Eyes: Conjunctivae and EOM are normal. Pupils are equal, round, and reactive to light. Right eye exhibits no discharge. Left eye exhibits no discharge. No scleral icterus.   Neck: Normal range of motion. Neck supple. Normal carotid pulses, no hepatojugular reflux and no JVD present. Carotid bruit is not present. No tracheal deviation present. No thyromegaly present.   Cardiovascular: Normal rate, regular rhythm, S1 normal and S2 normal.   No extrasystoles are present. PMI is not displaced.  Exam reveals no gallop, no S3, no distant heart sounds, no friction rub and no midsystolic click.    No murmur heard.  Pulses:       Carotid pulses are 2+ on the right side, and 2+ on the left side.       Radial pulses are 2+ on the right side, and 2+ on the left side.        Femoral pulses are 2+ on the right side, and 2+ on the left side.       Popliteal pulses are 2+ on the right side, and 2+ on the left side.        Dorsalis pedis pulses are 1+ on the right side, and 1+ on the left side.        Posterior tibial pulses are 1+ on the right side, and 1+ on the left side.   Pulmonary/Chest: Effort normal and breath sounds normal. No accessory muscle usage or stridor. No apnea, no tachypnea and no bradypnea. He is not intubated. No respiratory distress. He has no decreased breath sounds. He has no wheezes. He has no rales. He exhibits no tenderness and no bony tenderness.   Abdominal: He exhibits no distension, no pulsatile liver, no abdominal bruit, no ascites, no pulsatile midline mass and no mass. There is no tenderness. There is no rebound and no guarding.   Musculoskeletal: Normal range of motion. He exhibits no edema or tenderness.   Lymphadenopathy:     He has no cervical adenopathy.       2 + L leg and 1+ R leg edema         Neurological: He is alert and oriented to person, place, and time. He has  normal reflexes. No cranial nerve deficit. Coordination normal.   Skin: Skin is warm. No rash noted. No erythema. No pallor.     R shin-old scars with healed ulcers    Psychiatric: He has a normal mood and affect. His behavior is normal. Judgment and thought content normal.       Assessment:     1. Chronic venous insufficiency    2. Coronary artery disease involving native coronary artery of native heart without angina pectoris    3. PAD (peripheral artery disease)    4. Essential hypertension    5. Mixed hyperlipidemia    6. HIV positive        Plan:           Repeat venous ultrasound to assess for reflux disease   If he has superficial reflux he will have EVLT   If he has no superficial disease he will have a venogram       Compression stockings-20-30 mmHg  Elevate legs when resting  Exercise           Medical therapy for CAD and PAD   Aspirin   Plavix   Repatha   Will add acei if BP will tolerate        Continue with current medical plan and lifestyle changes.  Return sooner for concerns or questions. If symptoms persist go to the ED  I have reviewed all pertinent data on this patient       I have reviewed the patient's medical history in detail and updated the computerized patient record.    Orders Placed This Encounter   Procedures    US Lower Extremity Veins Bilateral Insuf     Standing Status:   Future     Standing Expiration Date:   2/21/2019     Order Specific Question:   May the Radiologist modify the order per protocol to meet the clinical needs of the patient?     Answer:   Yes       Follow up as scheduled. Return sooner for concerns or questions            He expressed verbal understanding and agreed with the plan        Patient's Medications   New Prescriptions    No medications on file   Previous Medications    ABACAVIR-LAMIVUDINE (EPZICOM) 600-300 MG PER TABLET    Take 1 tablet by mouth once daily.    ASPIRIN (ECOTRIN) 81 MG EC TABLET    Take 81 mg by mouth once daily.    BRIMONIDINE 0.1% (ALPHAGAN  P) 0.1 % DROP    Place 1 drop into both eyes 2 (two) times daily.     CHOLECALCIFEROL, VITAMIN D3, (VITAMIN D3 ORAL)    Take 50,000 Units by mouth once a week.     CLOPIDOGREL (PLAVIX) 75 MG TABLET    Take 1 tablet (75 mg total) by mouth once daily.    EFAVIRENZ (SUSTIVA) 600 MG TAB    Take 600 mg by mouth every evening.    EVOLOCUMAB 140 MG/ML SYRG    Inject 140 mg into the skin every 14 (fourteen) days.    EVOTAZ 300-150 MG TAB    Take 1 tablet by mouth once daily.    LATANOPROST 0.005 % OPHTHALMIC SOLUTION    1 drop every evening.    OLMESARTAN (BENICAR) 40 MG TABLET    Take 40 mg by mouth once daily.   Modified Medications    No medications on file   Discontinued Medications    No medications on file

## 2018-02-22 ENCOUNTER — TELEPHONE (OUTPATIENT)
Dept: CARDIOLOGY | Facility: CLINIC | Age: 78
End: 2018-02-22

## 2018-02-22 DIAGNOSIS — I87.2 VENOUS INSUFFICIENCY OF BOTH LOWER EXTREMITIES: Primary | ICD-10-CM

## 2018-02-22 NOTE — TELEPHONE ENCOUNTER
----- Message from Manuel Metzger MD sent at 2/22/2018  2:11 PM CST -----      Put orders for EVLT for him      Thanks      ZN

## 2018-02-26 ENCOUNTER — HOSPITAL ENCOUNTER (OUTPATIENT)
Dept: RADIOLOGY | Facility: HOSPITAL | Age: 78
Discharge: HOME | End: 2018-02-26
Attending: INTERNAL MEDICINE
Payer: MEDICARE

## 2018-02-26 DIAGNOSIS — I87.2 CHRONIC VENOUS INSUFFICIENCY: Chronic | ICD-10-CM

## 2018-02-26 PROCEDURE — 93970 EXTREMITY STUDY: CPT | Mod: 26,,, | Performed by: RADIOLOGY

## 2018-02-26 PROCEDURE — 93970 EXTREMITY STUDY: CPT | Mod: TC

## 2018-03-12 ENCOUNTER — TELEPHONE (OUTPATIENT)
Dept: FAMILY MEDICINE | Facility: CLINIC | Age: 78
End: 2018-03-12

## 2018-03-12 ENCOUNTER — OFFICE VISIT (OUTPATIENT)
Dept: FAMILY MEDICINE | Facility: CLINIC | Age: 78
End: 2018-03-12
Payer: MEDICARE

## 2018-03-12 VITALS
HEIGHT: 72 IN | HEART RATE: 68 BPM | TEMPERATURE: 98 F | WEIGHT: 205 LBS | BODY MASS INDEX: 27.77 KG/M2 | DIASTOLIC BLOOD PRESSURE: 68 MMHG | OXYGEN SATURATION: 99 % | SYSTOLIC BLOOD PRESSURE: 160 MMHG

## 2018-03-12 DIAGNOSIS — R09.89 BRUIT: ICD-10-CM

## 2018-03-12 DIAGNOSIS — R42 VERTIGO: ICD-10-CM

## 2018-03-12 DIAGNOSIS — I10 ESSENTIAL HYPERTENSION: Primary | ICD-10-CM

## 2018-03-12 DIAGNOSIS — R51.9 ACUTE NONINTRACTABLE HEADACHE, UNSPECIFIED HEADACHE TYPE: ICD-10-CM

## 2018-03-12 DIAGNOSIS — N18.9 CHRONIC KIDNEY DISEASE, UNSPECIFIED CKD STAGE: ICD-10-CM

## 2018-03-12 PROCEDURE — 99213 OFFICE O/P EST LOW 20 MIN: CPT | Mod: S$GLB,,, | Performed by: FAMILY MEDICINE

## 2018-03-12 PROCEDURE — G0009 ADMIN PNEUMOCOCCAL VACCINE: HCPCS | Mod: S$GLB,,, | Performed by: FAMILY MEDICINE

## 2018-03-12 PROCEDURE — 3077F SYST BP >= 140 MM HG: CPT | Mod: CPTII,S$GLB,, | Performed by: FAMILY MEDICINE

## 2018-03-12 PROCEDURE — 3078F DIAST BP <80 MM HG: CPT | Mod: CPTII,S$GLB,, | Performed by: FAMILY MEDICINE

## 2018-03-12 PROCEDURE — 90732 PPSV23 VACC 2 YRS+ SUBQ/IM: CPT | Mod: S$GLB,,, | Performed by: FAMILY MEDICINE

## 2018-03-12 RX ORDER — AMLODIPINE BESYLATE 5 MG/1
5 TABLET ORAL DAILY
Qty: 30 TABLET | Refills: 11 | Status: SHIPPED | OUTPATIENT
Start: 2018-03-12 | End: 2019-02-06

## 2018-03-12 RX ORDER — EVOLOCUMAB 140 MG/ML
INJECTION, SOLUTION SUBCUTANEOUS
COMMUNITY
Start: 2018-02-21 | End: 2018-03-21 | Stop reason: ALTCHOICE

## 2018-03-12 NOTE — PROGRESS NOTES
Subjective:      Patient ID: Osorio Boggs is a 77 y.o. male.    Chief Complaint: Headache (C/O pain on left side of his head for about a week ); Joint Swelling; and Dizziness    Pt reports room spinning on and off more frequently with L sided head pain starting in occipital area. Pt reports both come and go but have been happening more frequently now. Denies syncope or blurry vision. Pt's bp elevated in office. Pt report headache pain last 2 seconds when it comes. Denies OTC meds.       Review of Systems   Constitutional: Negative for appetite change, fatigue, fever and unexpected weight change.   HENT: Negative for congestion, ear pain, sinus pressure and sore throat.    Eyes: Negative for pain and visual disturbance.   Respiratory: Negative for shortness of breath.    Cardiovascular: Negative for chest pain.   Gastrointestinal: Negative for abdominal pain, constipation and diarrhea.   Endocrine: Negative for polyuria.   Genitourinary: Negative for difficulty urinating and frequency.   Musculoskeletal: Negative for arthralgias, back pain and myalgias.   Skin: Negative for color change.   Allergic/Immunologic: Negative.    Neurological: Positive for dizziness and headaches. Negative for syncope and weakness.   Hematological: Does not bruise/bleed easily.   Psychiatric/Behavioral: Negative for dysphoric mood and suicidal ideas. The patient is not nervous/anxious.      Objective:     Physical Exam   Constitutional: He is oriented to person, place, and time. He appears well-developed and well-nourished. No distress.   HENT:   Head: Normocephalic and atraumatic.   Right Ear: External ear normal.   Left Ear: External ear normal.   Mouth/Throat: Oropharynx is clear and moist. No oropharyngeal exudate.   Eyes: Conjunctivae and EOM are normal. Pupils are equal, round, and reactive to light. Right eye exhibits no discharge. Left eye exhibits no discharge. No scleral icterus.   Neck: Normal range of motion. Neck supple. No JVD  present. No tracheal deviation present. No thyromegaly present.   Cardiovascular: Normal rate and regular rhythm.  Exam reveals no gallop and no friction rub.    Murmur heard.  Pulses:       Carotid pulses are on the right side with bruit, and on the left side with bruit.  Pulmonary/Chest: Effort normal and breath sounds normal. No stridor. No respiratory distress. He has no wheezes. He has no rales. He exhibits no tenderness.   Abdominal: Soft. He exhibits no distension and no mass. There is no tenderness. There is no rebound and no guarding.   Musculoskeletal: Normal range of motion. He exhibits edema. He exhibits no tenderness.   L 3+ edema and R 2 + edema   Lymphadenopathy:     He has no cervical adenopathy.   Neurological: He is alert and oriented to person, place, and time. He has normal reflexes. He displays normal reflexes. No cranial nerve deficit. He exhibits normal muscle tone. Coordination normal.   Skin: Skin is warm and dry. No rash noted. He is not diaphoretic. No erythema. No pallor.   Psychiatric: He has a normal mood and affect. His behavior is normal. Judgment and thought content normal.   Nursing note and vitals reviewed.    Assessment:     1. Essential hypertension    2. Bruit    3. Vertigo    4. Acute nonintractable headache, unspecified headache type    5. Chronic kidney disease, unspecified CKD stage      Plan:     New Prescriptions    AMLODIPINE (NORVASC) 5 MG TABLET    Take 1 tablet (5 mg total) by mouth once daily.     Discontinued Medications    No medications on file     Modified Medications    No medications on file       Essential hypertension  Comments:  start norvasc  Orders:  -     US Carotid Bilateral; Future; Expected date: 03/12/2018  -     Lipid panel; Future    Bruit  Comments:  ultrasound  Orders:  -     US Carotid Bilateral; Future; Expected date: 03/12/2018  -     MRI Brain Without Contrast; Future; Expected date: 03/12/2018    Vertigo  Comments:  MRI of brain    Acute  nonintractable headache, unspecified headache type  Comments:  MRI ordered    Chronic kidney disease, unspecified CKD stage  Comments:  recheck bmp  Orders:  -     Basic metabolic panel; Future; Expected date: 03/12/2018    Other orders  -     amLODIPine (NORVASC) 5 MG tablet; Take 1 tablet (5 mg total) by mouth once daily.  Dispense: 30 tablet; Refill: 11  -     Tdap Vaccine  -     Pneumococcal Polysaccharide Vaccine (23 Valent) (SQ/IM)      Bruit maybe transmitted from heart but ultrasound ordered

## 2018-03-12 NOTE — TELEPHONE ENCOUNTER
----- Message from Anastasia Pickett sent at 3/12/2018  8:55 AM CDT -----  Contact: The pt called  Patient would like to be seen today.     Symptoms: Pain on left side of head that comes and goes/  Things spin when he lay down.    How long has patient had these symptoms:  Couple of days. Really concerned    If unable to be seen , can something be called into patient pharmacy?  Want to see the doctor    Pharmacy name: [facility]    Any drug allergies:     Pt can be reached at [phone] or cell at [phone]

## 2018-03-13 ENCOUNTER — TELEPHONE (OUTPATIENT)
Dept: CARDIOLOGY | Facility: CLINIC | Age: 78
End: 2018-03-13

## 2018-03-13 ENCOUNTER — HOSPITAL ENCOUNTER (OUTPATIENT)
Dept: RADIOLOGY | Facility: HOSPITAL | Age: 78
Discharge: HOME | End: 2018-03-13
Attending: FAMILY MEDICINE
Payer: MEDICARE

## 2018-03-13 DIAGNOSIS — R09.89 BRUIT: ICD-10-CM

## 2018-03-13 PROCEDURE — 70551 MRI BRAIN STEM W/O DYE: CPT | Mod: TC,PO

## 2018-03-13 NOTE — TELEPHONE ENCOUNTER
----- Message from Teo Aguirre sent at 3/13/2018  8:17 AM CDT -----  Contact: Romeo from [employer]/[phone], ext. [phone]  Romeo called to speak with your office about a medication request that they received and faxed over some forms that your office needed to fill out for clarification.  She is checking the status of those forms.    Please call and advise.

## 2018-03-14 ENCOUNTER — HOSPITAL ENCOUNTER (OUTPATIENT)
Dept: RADIOLOGY | Facility: HOSPITAL | Age: 78
Discharge: HOME | End: 2018-03-14
Attending: FAMILY MEDICINE
Payer: MEDICARE

## 2018-03-14 DIAGNOSIS — I10 ESSENTIAL HYPERTENSION: ICD-10-CM

## 2018-03-14 DIAGNOSIS — R09.89 BRUIT: ICD-10-CM

## 2018-03-14 PROCEDURE — 93880 EXTRACRANIAL BILAT STUDY: CPT | Mod: TC,PO

## 2018-03-15 ENCOUNTER — TELEPHONE (OUTPATIENT)
Dept: PHARMACY | Facility: CLINIC | Age: 78
End: 2018-03-15

## 2018-03-15 NOTE — TELEPHONE ENCOUNTER
PA for Repatha formulary exception (initiated by patient) DENIED because the preferred alternative is Praluent.    LM to inform patient.  Routing provider to advise. Appeal window: 60 days from 3/14/18. TTN

## 2018-03-18 ENCOUNTER — TELEPHONE (OUTPATIENT)
Dept: FAMILY MEDICINE | Facility: CLINIC | Age: 78
End: 2018-03-18

## 2018-03-18 DIAGNOSIS — I77.9 RIGHT-SIDED CAROTID ARTERY DISEASE: Primary | ICD-10-CM

## 2018-03-20 ENCOUNTER — LAB VISIT (OUTPATIENT)
Dept: LAB | Facility: HOSPITAL | Age: 78
End: 2018-03-20
Attending: FAMILY MEDICINE
Payer: MEDICARE

## 2018-03-20 ENCOUNTER — TELEPHONE (OUTPATIENT)
Dept: FAMILY MEDICINE | Facility: CLINIC | Age: 78
End: 2018-03-20

## 2018-03-20 DIAGNOSIS — M10.9 GOUT, UNSPECIFIED: Primary | ICD-10-CM

## 2018-03-20 DIAGNOSIS — N18.9 CHRONIC KIDNEY DISEASE, UNSPECIFIED CKD STAGE: ICD-10-CM

## 2018-03-20 DIAGNOSIS — I25.10 CORONARY ARTERY DISEASE, ANGINA PRESENCE UNSPECIFIED, UNSPECIFIED VESSEL OR LESION TYPE, UNSPECIFIED WHETHER NATIVE OR TRANSPLANTED HEART: Primary | ICD-10-CM

## 2018-03-20 DIAGNOSIS — I10 ESSENTIAL HYPERTENSION: ICD-10-CM

## 2018-03-20 DIAGNOSIS — I10 ESSENTIAL (PRIMARY) HYPERTENSION: ICD-10-CM

## 2018-03-20 LAB
ANION GAP SERPL CALC-SCNC: 11 MMOL/L
BUN SERPL-MCNC: 23 MG/DL
CALCIUM SERPL-MCNC: 9.1 MG/DL
CHLORIDE SERPL-SCNC: 106 MMOL/L
CHOLEST SERPL-MCNC: 224 MG/DL
CHOLEST/HDLC SERPL: 3.7 {RATIO}
CO2 SERPL-SCNC: 24 MMOL/L
CREAT SERPL-MCNC: 1.22 MG/DL
EST. GFR  (AFRICAN AMERICAN): >60 ML/MIN/1.73 M^2
EST. GFR  (NON AFRICAN AMERICAN): 56 ML/MIN/1.73 M^2
GLUCOSE SERPL-MCNC: 109 MG/DL
HDLC SERPL-MCNC: 60 MG/DL
HDLC SERPL: 26.8 %
LDLC SERPL CALC-MCNC: 144.2 MG/DL
NONHDLC SERPL-MCNC: 164 MG/DL
POTASSIUM SERPL-SCNC: 3.9 MMOL/L
SODIUM SERPL-SCNC: 141 MMOL/L
TRIGL SERPL-MCNC: 99 MG/DL

## 2018-03-20 PROCEDURE — 80048 BASIC METABOLIC PNL TOTAL CA: CPT | Mod: PO

## 2018-03-20 PROCEDURE — 36415 COLL VENOUS BLD VENIPUNCTURE: CPT | Mod: PO

## 2018-03-20 PROCEDURE — 80061 LIPID PANEL: CPT

## 2018-03-20 NOTE — TELEPHONE ENCOUNTER
PT was advised MRI  Of the Brain was  NORMAL     ----- Message from Keaton Adan MD sent at 3/18/2018  3:26 PM CDT -----  Patient has partial blockage of right carotid artery.  He should discuss this with his cardiologist

## 2018-03-20 NOTE — TELEPHONE ENCOUNTER
----- Message from Anastasia Pickett sent at 3/20/2018  1:26 PM CDT -----  Contact: The pt called  Requesting to get labs done to ensure that the Repatha is working well for his cholestrol.  If the insurance see where its doing good, they will approve for him.  This was suggested by a nurse in the [facility].  He would like to use the [facility] her in [place].   He can be reached at [phone]

## 2018-03-20 NOTE — TELEPHONE ENCOUNTER
Pt had labs done today already ( orders were already in the system ) - I advised pt we will return call with his results     He said [facility] is working on obtaining a PA for his cholesterol medications and required another lipid for an updated level

## 2018-03-20 NOTE — TELEPHONE ENCOUNTER
Patient had labs drawn today   See  note in epic from the specialty pharmacy. Insurance is requiring Praluent. or they can do appeal within 60 days. Pharmacist name at specialty is Isabel Scott if you want to reach out to her, she can help with appeal if that is route md wants to go     Dr Adan please review pt labs - lipid -  Do you want to continue the pt on praluent - if so please let us know so we can send an appeal to the pt insurance

## 2018-03-21 ENCOUNTER — TELEPHONE (OUTPATIENT)
Dept: CARDIOLOGY | Facility: CLINIC | Age: 78
End: 2018-03-21

## 2018-03-21 NOTE — TELEPHONE ENCOUNTER
----- Message from Dennys Nicole sent at 3/21/2018 11:22 AM CDT -----  Contact: [phone]/self  Pt states he's returning your call.  Please call and advise

## 2018-03-21 NOTE — TELEPHONE ENCOUNTER
----- Message from Beth Boggs sent at 3/21/2018  9:09 AM CDT -----  Contact: Self. [phone]  Patient would like to speak with you about a personal matter. Please advise

## 2018-03-21 NOTE — TELEPHONE ENCOUNTER
praluent is for cholesterol; Dr Camara ordered Repatha last month, a similar drug.  I think that message is for him.    I agree he needs something like that.

## 2018-03-21 NOTE — TELEPHONE ENCOUNTER
We received a denial for Repatha from PH.    PH prefers Praulent.    Please send it to the specialty clinic.

## 2018-03-22 ENCOUNTER — TELEPHONE (OUTPATIENT)
Dept: PHARMACY | Facility: HOSPITAL | Age: 78
End: 2018-03-22

## 2018-03-22 NOTE — TELEPHONE ENCOUNTER
Informed patient [facility] received a prescription for Praluent and it will require a prior authorization with their insurance company. We will update patient of status as more information is received.

## 2018-03-23 ENCOUNTER — TELEPHONE (OUTPATIENT)
Dept: CARDIOLOGY | Facility: CLINIC | Age: 78
End: 2018-03-23

## 2018-03-23 NOTE — TELEPHONE ENCOUNTER
Returned patients call.    He would like for you to review his Carotid US that revealed he has a partial blockage of right carotid artery.    He is questioning if the carotid blockage could have anything to do with the edema on his lower extremities.    Lastly, either way he would like to reschedule EVLT to 4/25, since his wife is having surgery on 3/28.

## 2018-03-23 NOTE — TELEPHONE ENCOUNTER
Documentation Only:    Faxed Prior Authorization form and supporting documentation for Praluent to insurance on 03/23/2018.

## 2018-03-23 NOTE — TELEPHONE ENCOUNTER
----- Message from Dennys Nicole sent at 3/23/2018 10:46 AM CDT -----  Contact: [phone]/self  Pt requesting to speak with you concerning rescheduling his procedure.  Please call and advise

## 2018-03-27 ENCOUNTER — TELEPHONE (OUTPATIENT)
Dept: FAMILY MEDICINE | Facility: CLINIC | Age: 78
End: 2018-03-27

## 2018-03-27 NOTE — TELEPHONE ENCOUNTER
Documentation Only:    Prior Authorization for Praluent has been approved for 9 months.    Approval dates:  03/26/2018 through 12/31/2018    Patient co-pay:  $0.00

## 2018-03-27 NOTE — TELEPHONE ENCOUNTER
He has mild carotid plaque that will be treated medically for now.      It is not related to his edema         Thanks        ZN

## 2018-03-27 NOTE — TELEPHONE ENCOUNTER
Changed from Repatha Sureclick to Praluent Pens due to insurance formulary changes in 2018. Device is the same so patient declines further consultation and injection training.  His next dose is due on 3/29/18, so he asks that Praluent 150mg/ml Pens be shipped out on 3/27/18 to arrive at his home on 3/28/18. He will be with his wife at an eye surgery tomorrow, but his daughter will be at home. Address confirmed - NO signature requirement requested. $0 copay (004). Patient will take his first dose of Praluent 150mg/ml on 3/29/18. [facility] will reach out to him a week after his 1st injection of Praluent 150mg and then monthly for refills.

## 2018-04-09 NOTE — TELEPHONE ENCOUNTER
Initial clinical follow-up conducted for Praluent.  Spoke with patient.  Name and  confirmed.  Confirmed patient start date of 3/29/18.  Patient confirms self administering Praluent with no difficulties.  He noticed a slight difference from the Repatha with the click at the start of injection which is not an issue.  Patient is aware that the next dose is this coming Thursday, .  Patient reports experiencing no side effects since beginning therapy.  Patient counseled on importance of maintaining adherence and f/u appointments. [facility] will reach out to patient in a week to schedule next refill.  Advised to call [facility] and provider if any issues arise.

## 2018-04-13 ENCOUNTER — TELEPHONE (OUTPATIENT)
Dept: CARDIOLOGY | Facility: CLINIC | Age: 78
End: 2018-04-13

## 2018-04-18 ENCOUNTER — TELEPHONE (OUTPATIENT)
Dept: PHARMACY | Facility: CLINIC | Age: 78
End: 2018-04-18

## 2018-04-25 ENCOUNTER — HOSPITAL ENCOUNTER (OUTPATIENT)
Facility: HOSPITAL | Age: 78
Discharge: HOME | End: 2018-04-25
Attending: INTERNAL MEDICINE | Admitting: INTERNAL MEDICINE
Payer: MEDICARE

## 2018-04-25 ENCOUNTER — SURGERY (OUTPATIENT)
Age: 78
End: 2018-04-25

## 2018-04-25 VITALS
DIASTOLIC BLOOD PRESSURE: 65 MMHG | SYSTOLIC BLOOD PRESSURE: 141 MMHG | WEIGHT: 205 LBS | RESPIRATION RATE: 19 BRPM | OXYGEN SATURATION: 100 % | BODY MASS INDEX: 27.77 KG/M2 | HEART RATE: 72 BPM | TEMPERATURE: 98 F | HEIGHT: 72 IN

## 2018-04-25 DIAGNOSIS — I87.2 VENOUS INSUFFICIENCY OF BOTH LOWER EXTREMITIES: Primary | ICD-10-CM

## 2018-04-25 PROCEDURE — 25000003 PHARM REV CODE 250

## 2018-04-25 PROCEDURE — 25000003 PHARM REV CODE 250: Performed by: INTERNAL MEDICINE

## 2018-04-25 RX ORDER — SODIUM CHLORIDE 9 MG/ML
INJECTION, SOLUTION INTRAVENOUS CONTINUOUS
Status: CANCELLED | OUTPATIENT
Start: 2018-04-25

## 2018-04-25 RX ORDER — DIPHENHYDRAMINE HCL 25 MG
50 CAPSULE ORAL ONCE
Status: CANCELLED | OUTPATIENT
Start: 2018-04-25 | End: 2018-04-25

## 2018-04-25 RX ADMIN — DIAZEPAM 10 MG: 5 TABLET ORAL at 09:04

## 2018-04-25 RX ADMIN — LIDOCAINE HYDROCHLORIDE: 10 INJECTION, SOLUTION INFILTRATION; PERINEURAL at 09:04

## 2018-04-25 NOTE — PROGRESS NOTES
He came today for R LSV EVLT      Although the vein was visualized during his diagnostic study  It was not visualized and the small proximal portion seen had no reflux        No procedure was done   He will follow up in the office        He will have a venogram to assess for May-Thurner Syndrome for excessive L leg edema

## 2018-04-25 NOTE — H&P
Patient ID:  Osorio Boggs is a 77 y.o. male who presents for follow up of Coronary Artery Disease; Hyperlipidemia; Hypertension; s/p PCI; and Venous Insufficiency        HPI:         He is here to establish care after Dr. Ramos departure. He has CAD s/p PCI LAD, LCX, and RCA (2017)-CTS turned down for CABG, HTN, HLP, HIV, PAD-no claudication, and venous insufficiency presenting with complaints of recurrent leg edema, L >> R. R leg ulcers healed with wound care. He wears compression stockings and exercises regularly.         He was experiencing CCS III angina, and Dr. Adan order a stress test, which is abnormal with intermediate risk result.  Underwent cardiac cath in June - 3V CAD.  PCI of LAD and PTCA of RCA performed.  STill with LCX disease and RCA disease not stented due to insufficient stent size for RCA.  Returned for PCI of proximal RCA with BMS and DONIS of proximal LAD. Symptoms resolved.            Arterial U/S indicated monophasic wave forms of RCFA and some abnormal waveforms in popliteal system of RLE. Patient denies claudication symptoms.              Venous ultrasound 2/2017                 R GSV 4 mm without reflux              R LSV 5 mm with 1168 msec reflux                    L GSV 6 mm without reflux              L LSV 5 mm with 860 msec reflux                                                    Patient Active Problem List     Diagnosis Date Noted    Statin intolerance 10/03/2017    Coronary artery disease involving native coronary artery of native heart without angina pectoris 07/06/2017    Azotemia 03/20/2017    Chronic venous insufficiency 03/14/2017          Venous ultrasound 2/2017                 R GSV 4 mm without reflux              R LSV 5 mm with 1168 msec reflux                    L GSV 6 mm without reflux              L LSV 5 mm with 860 msec reflux             HIV positive 02/09/2017    PAD (peripheral artery disease) 02/09/2017    Hypertension 02/08/2017    Hyperlipidemia  2017    Ulcer of right lower extremity with fat layer exposed 2017    Venous stasis 2017    Glaucoma 2015               Right Arm BP - Sittin/70  Left Arm BP - Sittin/70           LABS     Lipid panel        Lab Results   Component Value Date     CHOL 238 (H) 10/17/2017            Lab Results   Component Value Date     HDL 67 10/17/2017            Lab Results   Component Value Date     LDLCALC 160.2 (H) 10/17/2017      Lab Results   Component Value Date     TRIG 54 10/17/2017            Lab Results   Component Value Date     CHOLHDL 28.2 10/17/2017               Review of Systems   Constitution: Negative for diaphoresis, weakness, night sweats, weight gain and weight loss.   HENT: Negative for congestion.    Eyes: Negative for blurred vision, discharge and double vision.   Cardiovascular: Positive for leg swelling. Negative for chest pain, claudication, cyanosis, dyspnea on exertion, irregular heartbeat, near-syncope, orthopnea, palpitations, paroxysmal nocturnal dyspnea and syncope.   Respiratory: Negative for cough, shortness of breath and wheezing.    Endocrine: Negative for cold intolerance, heat intolerance and polyphagia.   Hematologic/Lymphatic: Negative for adenopathy and bleeding problem. Does not bruise/bleed easily.   Skin: Negative for dry skin and nail changes.   Musculoskeletal: Negative for arthritis, back pain, falls, joint pain, myalgias and neck pain.   Gastrointestinal: Negative for bloating, abdominal pain, change in bowel habit and constipation.   Genitourinary: Negative for bladder incontinence, dysuria, flank pain, genital sores and missed menses.   Neurological: Negative for aphonia, brief paralysis, difficulty with concentration and dizziness.   Psychiatric/Behavioral: Negative for altered mental status and memory loss. The patient does not have insomnia.    Allergic/Immunologic: Negative for environmental allergies.         Objective:   Physical Exam    Constitutional: He is oriented to person, place, and time. He appears well-developed and well-nourished. He is not intubated.   HENT:   Head: Normocephalic and atraumatic.   Right Ear: External ear normal.   Left Ear: External ear normal.   Mouth/Throat: Oropharynx is clear and moist.   Eyes: Conjunctivae and EOM are normal. Pupils are equal, round, and reactive to light. Right eye exhibits no discharge. Left eye exhibits no discharge. No scleral icterus.   Neck: Normal range of motion. Neck supple. Normal carotid pulses, no hepatojugular reflux and no JVD present. Carotid bruit is not present. No tracheal deviation present. No thyromegaly present.   Cardiovascular: Normal rate, regular rhythm, S1 normal and S2 normal.   No extrasystoles are present. PMI is not displaced.  Exam reveals no gallop, no S3, no distant heart sounds, no friction rub and no midsystolic click.    No murmur heard.  Pulses:       Carotid pulses are 2+ on the right side, and 2+ on the left side.       Radial pulses are 2+ on the right side, and 2+ on the left side.        Femoral pulses are 2+ on the right side, and 2+ on the left side.       Popliteal pulses are 2+ on the right side, and 2+ on the left side.        Dorsalis pedis pulses are 1+ on the right side, and 1+ on the left side.        Posterior tibial pulses are 1+ on the right side, and 1+ on the left side.   Pulmonary/Chest: Effort normal and breath sounds normal. No accessory muscle usage or stridor. No apnea, no tachypnea and no bradypnea. He is not intubated. No respiratory distress. He has no decreased breath sounds. He has no wheezes. He has no rales. He exhibits no tenderness and no bony tenderness.   Abdominal: He exhibits no distension, no pulsatile liver, no abdominal bruit, no ascites, no pulsatile midline mass and no mass. There is no tenderness. There is no rebound and no guarding.   Musculoskeletal: Normal range of motion. He exhibits no edema or tenderness.    Lymphadenopathy:     He has no cervical adenopathy.       2 + L leg and 1+ R leg edema         Neurological: He is alert and oriented to person, place, and time. He has normal reflexes. No cranial nerve deficit. Coordination normal.   Skin: Skin is warm. No rash noted. No erythema. No pallor.     R shin-old scars with healed ulcers    Psychiatric: He has a normal mood and affect. His behavior is normal. Judgment and thought content normal.         Assessment:      1. Chronic venous insufficiency    2. Coronary artery disease involving native coronary artery of native heart without angina pectoris    3. PAD (peripheral artery disease)    4. Essential hypertension    5. Mixed hyperlipidemia    6. HIV positive          Plan:               Repeat venous ultrasound to assess for reflux disease              If he has superficial reflux he will have EVLT              If he has no superficial disease he will have a venogram         Compression stockings-20-30 mmHg  Elevate legs when resting  Exercise                         Medical therapy for CAD and PAD              Aspirin              Plavix              Repatha              Will add acei if BP will tolerate           Continue with current medical plan and lifestyle changes.  Return sooner for concerns or questions. If symptoms persist go to the ED  I have reviewed all pertinent data on this patient         I have reviewed the patient's medical history in detail and updated the computerized patient record.           Orders Placed This Encounter   Procedures    US Lower Extremity Veins Bilateral Insuf       Standing Status:   Future       Standing Expiration Date:   2/21/2019       Order Specific Question:   May the Radiologist modify the order per protocol to meet the clinical needs of the patient?       Answer:   Yes         Follow up as scheduled. Return sooner for concerns or questions                 He expressed verbal understanding and agreed with the  plan                Patient's Medications   New Prescriptions     No medications on file   Previous Medications     ABACAVIR-LAMIVUDINE (EPZICOM) 600-300 MG PER TABLET    Take 1 tablet by mouth once daily.     ASPIRIN (ECOTRIN) 81 MG EC TABLET    Take 81 mg by mouth once daily.     BRIMONIDINE 0.1% (ALPHAGAN P) 0.1 % DROP    Place 1 drop into both eyes 2 (two) times daily.      CHOLECALCIFEROL, VITAMIN D3, (VITAMIN D3 ORAL)    Take 50,000 Units by mouth once a week.      CLOPIDOGREL (PLAVIX) 75 MG TABLET    Take 1 tablet (75 mg total) by mouth once daily.     EFAVIRENZ (SUSTIVA) 600 MG TAB    Take 600 mg by mouth every evening.     EVOLOCUMAB 140 MG/ML SYRG    Inject 140 mg into the skin every 14 (fourteen) days.     EVOTAZ 300-150 MG TAB    Take 1 tablet by mouth once daily.     LATANOPROST 0.005 % OPHTHALMIC SOLUTION    1 drop every evening.     OLMESARTAN (BENICAR) 40 MG TABLET    Take 40 mg by mouth once daily.   Modified Medications     No medications on file   Discontinued Medications     No medications on file              He is here today for [facility] EVLT        Consent signed and placed in the chart

## 2018-04-26 ENCOUNTER — HOSPITAL ENCOUNTER (OUTPATIENT)
Dept: RADIOLOGY | Facility: HOSPITAL | Age: 78
Discharge: HOME | End: 2018-04-26
Attending: INTERNAL MEDICINE
Payer: MEDICARE

## 2018-04-26 DIAGNOSIS — I87.2 VENOUS INSUFFICIENCY OF BOTH LOWER EXTREMITIES: ICD-10-CM

## 2018-04-26 PROCEDURE — 93970 EXTREMITY STUDY: CPT | Mod: TC

## 2018-04-26 PROCEDURE — 93970 EXTREMITY STUDY: CPT | Mod: 26,,, | Performed by: RADIOLOGY

## 2018-05-03 ENCOUNTER — TELEPHONE (OUTPATIENT)
Dept: CARDIOLOGY | Facility: CLINIC | Age: 78
End: 2018-05-03

## 2018-05-03 NOTE — TELEPHONE ENCOUNTER
----- Message from Sofie Corado MA sent at 5/3/2018 10:17 AM CDT -----  Patient is calling in regards to the procedure he's scheduled on 5/8. He states he was not aware he was scheduled or needed a procedure.

## 2018-05-03 NOTE — TELEPHONE ENCOUNTER
----- Message from Dennys Nicole sent at 5/3/2018  2:16 PM CDT -----  Contact: [phone]/self  Patient is returning your call. Please advise

## 2018-05-03 NOTE — TELEPHONE ENCOUNTER
----- Message from Kandy Gilbert sent at 5/3/2018  9:36 AM CDT -----  Contact: [phone]/self  Pt its requesting to speak with the nurse states he got a call stating he had an appointment on 05/08/18. Pt states he doesn't know what this appointment its for and doesn't have any information . Please advise

## 2018-05-03 NOTE — TELEPHONE ENCOUNTER
We can see him in the office       I will ask the cath team to adjust the after the clinic visit      Thanks      ZN

## 2018-05-04 NOTE — TELEPHONE ENCOUNTER
----- Message from Beth Boggs sent at 5/4/2018  9:47 AM CDT -----  Contact: Self. [phone]  Patient would like to speak with you about not having any information on his procedure. Patient states he is suppose to have an ultrasound before the procedure and he is confused. Patient was upset and expressed a lot of frustration. Please advise

## 2018-05-04 NOTE — TELEPHONE ENCOUNTER
Left message.    I have left multiple messages for patient, advising him that he needs to schedule an office follow up .    Unfortunately, he does not answer when I call so I have to leave a message.

## 2018-05-07 ENCOUNTER — TELEPHONE (OUTPATIENT)
Dept: CARDIOLOGY | Facility: CLINIC | Age: 78
End: 2018-05-07

## 2018-05-07 NOTE — TELEPHONE ENCOUNTER
----- Message from Therese Shayy sent at 5/7/2018  9:36 AM CDT -----  Contact: self, [phone]  Patient is frustrated. States he has been calling unable to speak with you. States he was told by doctor he might need another ultrasound before 5/8 procedure. Patient is under impression he has a procedure scheduled for tomorrow and has not received any other information. Please advise.

## 2018-05-09 ENCOUNTER — OFFICE VISIT (OUTPATIENT)
Dept: CARDIOLOGY | Facility: CLINIC | Age: 78
End: 2018-05-09
Payer: MEDICARE

## 2018-05-09 VITALS
SYSTOLIC BLOOD PRESSURE: 130 MMHG | WEIGHT: 201 LBS | BODY MASS INDEX: 26.64 KG/M2 | HEART RATE: 68 BPM | DIASTOLIC BLOOD PRESSURE: 80 MMHG | HEIGHT: 73 IN

## 2018-05-09 DIAGNOSIS — I10 ESSENTIAL HYPERTENSION: ICD-10-CM

## 2018-05-09 DIAGNOSIS — E78.2 MIXED HYPERLIPIDEMIA: ICD-10-CM

## 2018-05-09 DIAGNOSIS — I87.2 VENOUS INSUFFICIENCY OF BOTH LOWER EXTREMITIES: ICD-10-CM

## 2018-05-09 DIAGNOSIS — I87.2 CHRONIC VENOUS INSUFFICIENCY: Primary | Chronic | ICD-10-CM

## 2018-05-09 DIAGNOSIS — I25.10 CORONARY ARTERY DISEASE INVOLVING NATIVE CORONARY ARTERY OF NATIVE HEART WITHOUT ANGINA PECTORIS: ICD-10-CM

## 2018-05-09 DIAGNOSIS — Z21 HIV POSITIVE: ICD-10-CM

## 2018-05-09 PROCEDURE — 3079F DIAST BP 80-89 MM HG: CPT | Mod: CPTII,S$GLB,, | Performed by: INTERNAL MEDICINE

## 2018-05-09 PROCEDURE — 99999 PR PBB SHADOW E&M-EST. PATIENT-LVL III: CPT | Mod: PBBFAC,,, | Performed by: INTERNAL MEDICINE

## 2018-05-09 PROCEDURE — 3075F SYST BP GE 130 - 139MM HG: CPT | Mod: CPTII,S$GLB,, | Performed by: INTERNAL MEDICINE

## 2018-05-09 PROCEDURE — 99215 OFFICE O/P EST HI 40 MIN: CPT | Mod: S$GLB,,, | Performed by: INTERNAL MEDICINE

## 2018-05-09 NOTE — PATIENT INSTRUCTIONS
Understanding Chronic Venous Insufficiency  Problems with the veins in the legs may lead to chronic venous insufficiency (CVI). CVI means that there is a long-term problem with the veins not being able to pump blood back to your heart. When this happens, blood stays in the legs and causes swelling and aching.   Two problems that may lead to chronic venous insufficiency are:  · Damaged valves. Valves keep blood flowing from the legs through the blood vessels and back to the heart. When the valves are damaged, blood does not flow as well.   · Deep vein thrombosis (DVT). Blood clots may form in the deep veins of the legs. This may cause pain, redness, and swelling in the legs. It may also block the flow of blood back to the heart. Seek immediate medical care if you have these symptoms.  · A blood clot in the leg can also break off and travel to the lungs. This is called pulmonary embolism (PE). In the lungs, the clot can cut off the flow of blood. This may cause chest pain, trouble breathing, sweating, a fast heartbeat, coughing (may cough up blood), and fainting. It is a medical emergency and may cause death. Call 911 if you have these symptoms.  · Healthcare providers call the two conditions, DVT and PE, venous thromboembolism (VTE).  CVI cant be cured, but you can control leg swelling to reduce the likelihood of ulcers (sores).  Recognizing the symptoms  Be aware of the following:  · If you stand or sit with your feet down for long periods, your legs may ache or feel heavy.  · Swollen ankles are possibly the most common symptom of CVI.  · As swelling increases, the skin over your ankles may show red spots or a brownish tinge. The skin may feel leathery or scaly, and may start to itch.  · If swelling is not controlled, an ulcer (open wound) may form.  What you can do  Reduce your risk of developing ulcers by doing the following:  · Increase blood flow back to your heart by elevating your legs, exercising daily,  and wearing elastic stockings.  · Boost blood flow in your legs by losing excess weight.  · If you must stand or sit in one place for a period of time, keep your blood moving by wiggling your toes, shifting your body position, and rising up on the balls of your feet.    Date Last Reviewed: 5/1/2016  © 4151-5681 [employer]. [address] [place] [address], [place] [address]. All rights reserved. This information is not intended as a substitute for professional medical care. Always follow your healthcare professional's instructions.

## 2018-05-09 NOTE — PROGRESS NOTES
Subjective:   Patient ID:  Osorio Boggs is a 77 y.o. male who presents for follow up of Coronary Artery Disease; Hyperlipidemia; Hypertension; and Chronic Venous Insufficiency      HPI:       He is here to follow up and sign consent for bilateral venogram for further evaluation and treatment of venous insufficiency. Ultrasound in 2/2017 revealed bilateral LSV reflux. Repeat study 2/2018 only revealed R LSV reflux even though both GSV are dilated-without reflux. EVLT of R LSV was aborted-too small and his symptoms were out of proportion to ultrasound. He will proceed with bilateral deep venogram to assess for iliac vein compression.        He has CAD s/p PCI LAD, LCX, and RCA (2017)-CTS turned down for CABG, HTN, HLP, HIV, PAD-no claudication, and venous insufficiency presenting with complaints of recurrent leg edema, L >> R. R leg ulcers healed with wound care. He wears compression stockings and exercises regularly.       He was experiencing CCS III angina, and Dr. Adan order a stress test, which is abnormal with intermediate risk result.  Underwent cardiac cath in June - 3V CAD.  PCI of LAD and PTCA of RCA performed.  STill with LCX disease and RCA disease not stented due to insufficient stent size for RCA.  Returned for PCI of proximal RCA with BMS and DONIS of proximal LAD. Symptoms resolved.           Arterial U/S indicated monophasic wave forms of RCFA and some abnormal waveforms in popliteal system of RLE. Patient denies claudication symptoms.            Venous ultrasound 2/2017     R GSV 4 mm without reflux   R LSV 5 mm with 1168 msec reflux       L GSV 6 mm without reflux   L LSV 5 mm with 860 msec reflux        Venous ultrasound       R GSV 8 mm without reflux   R LSV 4 mm with 1620 msec reflux       L GSV 6 mm without reflux   L LSV 3 mm without reflux                     Patient Active Problem List    Diagnosis Date Noted    Venous insufficiency of both lower extremities 04/25/2018    Statin  intolerance 10/03/2017    Coronary artery disease involving native coronary artery of native heart without angina pectoris 2017    Azotemia 2017    Chronic venous insufficiency 2017       Venous ultrasound 2017     R GSV 4 mm without reflux   R LSV 5 mm with 1168 msec reflux       L GSV 6 mm without reflux   L LSV 5 mm with 860 msec reflux          HIV positive 2017    PAD (peripheral artery disease) 2017    Hypertension 2017    Hyperlipidemia 2017    Ulcer of right lower extremity with fat layer exposed 2017    Venous stasis 2017    Glaucoma 2015           Right Arm BP - Sittin/80  Left Arm BP - Sittin/80        LABS    Lipid panel  Lab Results   Component Value Date    CHOL 224 (H) 2018    CHOL 238 (H) 10/17/2017     Lab Results   Component Value Date    HDL 60 2018    HDL 67 10/17/2017     Lab Results   Component Value Date    LDLCALC 144.2 2018    LDLCALC 160.2 (H) 10/17/2017     Lab Results   Component Value Date    TRIG 99 2018    TRIG 54 10/17/2017     Lab Results   Component Value Date    CHOLHDL 26.8 2018    CHOLHDL 28.2 10/17/2017            Review of Systems   Constitution: Negative for diaphoresis, weakness, night sweats, weight gain and weight loss.   HENT: Negative for congestion.    Eyes: Negative for blurred vision, discharge and double vision.   Cardiovascular: Positive for leg swelling. Negative for chest pain, claudication, cyanosis, dyspnea on exertion, irregular heartbeat, near-syncope, orthopnea, palpitations, paroxysmal nocturnal dyspnea and syncope.   Respiratory: Negative for cough, shortness of breath and wheezing.    Endocrine: Negative for cold intolerance, heat intolerance and polyphagia.   Hematologic/Lymphatic: Negative for adenopathy and bleeding problem. Does not bruise/bleed easily.   Skin: Negative for dry skin and nail changes.   Musculoskeletal: Negative for  arthritis, back pain, falls, joint pain, myalgias and neck pain.   Gastrointestinal: Negative for bloating, abdominal pain, change in bowel habit and constipation.   Genitourinary: Negative for bladder incontinence, dysuria, flank pain, genital sores and missed menses.   Neurological: Negative for aphonia, brief paralysis, difficulty with concentration and dizziness.   Psychiatric/Behavioral: Negative for altered mental status and memory loss. The patient does not have insomnia.    Allergic/Immunologic: Negative for environmental allergies.       Objective:   Physical Exam   Constitutional: He is oriented to person, place, and time. He appears well-developed and well-nourished. He is not intubated.   HENT:   Head: Normocephalic and atraumatic.   Right Ear: External ear normal.   Left Ear: External ear normal.   Mouth/Throat: Oropharynx is clear and moist.   Eyes: Conjunctivae and EOM are normal. Pupils are equal, round, and reactive to light. Right eye exhibits no discharge. Left eye exhibits no discharge. No scleral icterus.   Neck: Normal range of motion. Neck supple. Normal carotid pulses, no hepatojugular reflux and no JVD present. Carotid bruit is not present. No tracheal deviation present. No thyromegaly present.   Cardiovascular: Normal rate, regular rhythm, S1 normal and S2 normal.   No extrasystoles are present. PMI is not displaced.  Exam reveals no gallop, no S3, no distant heart sounds, no friction rub and no midsystolic click.    No murmur heard.  Pulses:       Carotid pulses are 2+ on the right side, and 2+ on the left side.       Radial pulses are 2+ on the right side, and 2+ on the left side.        Femoral pulses are 2+ on the right side, and 2+ on the left side.       Popliteal pulses are 2+ on the right side, and 2+ on the left side.        Dorsalis pedis pulses are 1+ on the right side, and 1+ on the left side.        Posterior tibial pulses are 1+ on the right side, and 1+ on the left side.    Pulmonary/Chest: Effort normal and breath sounds normal. No accessory muscle usage or stridor. No apnea, no tachypnea and no bradypnea. He is not intubated. No respiratory distress. He has no decreased breath sounds. He has no wheezes. He has no rales. He exhibits no tenderness and no bony tenderness.   Abdominal: He exhibits no distension, no pulsatile liver, no abdominal bruit, no ascites, no pulsatile midline mass and no mass. There is no tenderness. There is no rebound and no guarding.   Musculoskeletal: Normal range of motion. He exhibits no edema or tenderness.   Lymphadenopathy:     He has no cervical adenopathy.       2 + L leg and 1+ R leg edema         Neurological: He is alert and oriented to person, place, and time. He has normal reflexes. No cranial nerve deficit. Coordination normal.   Skin: Skin is warm. No rash noted. No erythema. No pallor.     R shin-old scars with healed ulcers    Psychiatric: He has a normal mood and affect. His behavior is normal. Judgment and thought content normal.       Assessment:     1. Chronic venous insufficiency    2. Essential hypertension    3. Mixed hyperlipidemia    4. Venous insufficiency of both lower extremities    5. Coronary artery disease involving native coronary artery of native heart without angina pectoris    6. HIV positive        Plan:           Proceed with venogram with possible intervention 5/10/2018   No significant superficial disease   He will proceed with venogram with IVUS   Possible venoplasty       Risks, benefits and alternatives of peripheral catheterization and possible intervention were discussed with the patient. All questions were answered and informed consent obtained.       He will start NOAC after venoplasty for L POP DVT   It appears chronic not previously seen   Edema has been present prior to the DVT seen in 4/2018             Compression stockings-20-30 mmHg  Elevate legs when resting  Exercise           Medical therapy for CAD  and PAD   Aspirin   Plavix   Repatha   Will add acei if BP will tolerate        Continue with current medical plan and lifestyle changes.  Return sooner for concerns or questions. If symptoms persist go to the ED  I have reviewed all pertinent data on this patient       I have reviewed the patient's medical history in detail and updated the computerized patient record.        Follow up as scheduled. Return sooner for concerns or questions            He expressed verbal understanding and agreed with the plan        Patient's Medications   New Prescriptions    No medications on file   Previous Medications    ABACAVIR-LAMIVUDINE (EPZICOM) 600-300 MG PER TABLET    Take 1 tablet by mouth once daily.    ALIROCUMAB 150 MG/ML PNIJ    Inject 1 Syringe into the skin every 14 (fourteen) days.    AMLODIPINE (NORVASC) 5 MG TABLET    Take 1 tablet (5 mg total) by mouth once daily.    ASPIRIN (ECOTRIN) 81 MG EC TABLET    Take 81 mg by mouth once daily.    BRIMONIDINE 0.1% (ALPHAGAN P) 0.1 % DROP    Place 1 drop into both eyes 2 (two) times daily.     CHOLECALCIFEROL, VITAMIN D3, (VITAMIN D3 ORAL)    Take 50,000 Units by mouth once a week.     CLOPIDOGREL (PLAVIX) 75 MG TABLET    Take 1 tablet (75 mg total) by mouth once daily.    EFAVIRENZ (SUSTIVA) 600 MG TAB    Take 600 mg by mouth every evening.    EVOTAZ 300-150 MG TAB    Take 1 tablet by mouth once daily.    LATANOPROST 0.005 % OPHTHALMIC SOLUTION    1 drop every evening.    OLMESARTAN (BENICAR) 40 MG TABLET    Take 40 mg by mouth once daily.   Modified Medications    No medications on file   Discontinued Medications    No medications on file

## 2018-05-10 ENCOUNTER — SURGERY (OUTPATIENT)
Age: 78
End: 2018-05-10

## 2018-05-10 DIAGNOSIS — I87.2 VENOUS INSUFFICIENCY OF LOWER EXTREMITY, UNSPECIFIED LATERALITY: Primary | ICD-10-CM

## 2018-05-10 RX ORDER — SODIUM CHLORIDE 9 MG/ML
INJECTION, SOLUTION INTRAVENOUS CONTINUOUS
Status: CANCELLED | OUTPATIENT
Start: 2018-05-10

## 2018-05-10 RX ORDER — DEXTROSE MONOHYDRATE AND SODIUM CHLORIDE 5; .45 G/100ML; G/100ML
INJECTION, SOLUTION INTRAVENOUS CONTINUOUS
Status: CANCELLED | OUTPATIENT
Start: 2018-05-10

## 2018-05-10 RX ORDER — DIPHENHYDRAMINE HCL 25 MG
50 CAPSULE ORAL ONCE
Status: CANCELLED | OUTPATIENT
Start: 2018-05-10 | End: 2018-05-10

## 2018-05-11 ENCOUNTER — HOSPITAL ENCOUNTER (OUTPATIENT)
Facility: HOSPITAL | Age: 78
Discharge: HOME | End: 2018-05-11
Attending: INTERNAL MEDICINE | Admitting: INTERNAL MEDICINE
Payer: MEDICARE

## 2018-05-11 VITALS
HEIGHT: 72 IN | SYSTOLIC BLOOD PRESSURE: 173 MMHG | WEIGHT: 201 LBS | BODY MASS INDEX: 27.22 KG/M2 | RESPIRATION RATE: 13 BRPM | HEART RATE: 66 BPM | TEMPERATURE: 98 F | OXYGEN SATURATION: 98 % | DIASTOLIC BLOOD PRESSURE: 79 MMHG

## 2018-05-11 DIAGNOSIS — I87.2 VENOUS INSUFFICIENCY OF BOTH LOWER EXTREMITIES: ICD-10-CM

## 2018-05-11 DIAGNOSIS — I87.2 VENOUS INSUFFICIENCY OF LOWER EXTREMITY, UNSPECIFIED LATERALITY: ICD-10-CM

## 2018-05-11 DIAGNOSIS — H40.9 GLAUCOMA: ICD-10-CM

## 2018-05-11 DIAGNOSIS — I87.2 CHRONIC VENOUS INSUFFICIENCY: Chronic | ICD-10-CM

## 2018-05-11 LAB
ANION GAP SERPL CALC-SCNC: 8 MMOL/L
BUN SERPL-MCNC: 44 MG/DL
CALCIUM SERPL-MCNC: 8.8 MG/DL
CHLORIDE SERPL-SCNC: 110 MMOL/L
CO2 SERPL-SCNC: 19 MMOL/L
CREAT SERPL-MCNC: 1.9 MG/DL
ERYTHROCYTE [DISTWIDTH] IN BLOOD BY AUTOMATED COUNT: 13.5 %
EST. GFR  (AFRICAN AMERICAN): 38 ML/MIN/1.73 M^2
EST. GFR  (NON AFRICAN AMERICAN): 33 ML/MIN/1.73 M^2
GLUCOSE SERPL-MCNC: 106 MG/DL
HCT VFR BLD AUTO: 32.1 %
HGB BLD-MCNC: 10.7 G/DL
INR PPP: 1
MCH RBC QN AUTO: 32.1 PG
MCHC RBC AUTO-ENTMCNC: 33.3 G/DL
MCV RBC AUTO: 96 FL
PLATELET # BLD AUTO: 190 K/UL
PMV BLD AUTO: 10.5 FL
POC ACTIVATED CLOTTING TIME K: 175 SEC (ref 74–137)
POC ACTIVATED CLOTTING TIME K: 197 SEC (ref 74–137)
POC ACTIVATED CLOTTING TIME K: 208 SEC (ref 74–137)
POTASSIUM SERPL-SCNC: 5.1 MMOL/L
PROTHROMBIN TIME: 10.2 SEC
RBC # BLD AUTO: 3.33 M/UL
SAMPLE: ABNORMAL
SODIUM SERPL-SCNC: 137 MMOL/L
WBC # BLD AUTO: 6.5 K/UL

## 2018-05-11 PROCEDURE — 37253 INTRVASC US NONCORONARY ADDL: CPT | Mod: ,,, | Performed by: INTERNAL MEDICINE

## 2018-05-11 PROCEDURE — 27000014 CATH LAB PROCEDURE

## 2018-05-11 PROCEDURE — 80048 BASIC METABOLIC PNL TOTAL CA: CPT

## 2018-05-11 PROCEDURE — 93005 ELECTROCARDIOGRAM TRACING: CPT | Mod: 59

## 2018-05-11 PROCEDURE — 36011 PLACE CATHETER IN VEIN: CPT | Mod: 50,59,, | Performed by: INTERNAL MEDICINE

## 2018-05-11 PROCEDURE — 36415 COLL VENOUS BLD VENIPUNCTURE: CPT

## 2018-05-11 PROCEDURE — 37238 OPEN/PERQ PLACE STENT SAME: CPT | Mod: 50,,, | Performed by: INTERNAL MEDICINE

## 2018-05-11 PROCEDURE — 99220 PR INITIAL OBSERVATION CARE,LEVL III: CPT | Mod: ,,, | Performed by: INTERNAL MEDICINE

## 2018-05-11 PROCEDURE — 85027 COMPLETE CBC AUTOMATED: CPT

## 2018-05-11 PROCEDURE — 25000003 PHARM REV CODE 250: Performed by: INTERNAL MEDICINE

## 2018-05-11 PROCEDURE — 99152 MOD SED SAME PHYS/QHP 5/>YRS: CPT | Mod: ,,, | Performed by: INTERNAL MEDICINE

## 2018-05-11 PROCEDURE — 85610 PROTHROMBIN TIME: CPT

## 2018-05-11 PROCEDURE — C1769 GUIDE WIRE: HCPCS

## 2018-05-11 PROCEDURE — 25500020 PHARM REV CODE 255

## 2018-05-11 PROCEDURE — 25000003 PHARM REV CODE 250

## 2018-05-11 PROCEDURE — 93010 ELECTROCARDIOGRAM REPORT: CPT | Mod: ,,, | Performed by: INTERNAL MEDICINE

## 2018-05-11 PROCEDURE — 37252 INTRVASC US NONCORONARY 1ST: CPT | Mod: ,,, | Performed by: INTERNAL MEDICINE

## 2018-05-11 PROCEDURE — 63600175 PHARM REV CODE 636 W HCPCS

## 2018-05-11 RX ORDER — DIPHENHYDRAMINE HCL 25 MG
50 CAPSULE ORAL ONCE
Status: DISCONTINUED | OUTPATIENT
Start: 2018-05-11 | End: 2018-05-11 | Stop reason: HOSPADM

## 2018-05-11 RX ORDER — DIPHENHYDRAMINE HCL 25 MG
50 CAPSULE ORAL ONCE
Status: DISCONTINUED | OUTPATIENT
Start: 2018-05-11 | End: 2018-05-11

## 2018-05-11 RX ORDER — SODIUM CHLORIDE 9 MG/ML
INJECTION, SOLUTION INTRAVENOUS CONTINUOUS
Status: DISCONTINUED | OUTPATIENT
Start: 2018-05-11 | End: 2018-05-11

## 2018-05-11 RX ORDER — DEXTROSE MONOHYDRATE AND SODIUM CHLORIDE 5; .45 G/100ML; G/100ML
INJECTION, SOLUTION INTRAVENOUS CONTINUOUS
Status: DISCONTINUED | OUTPATIENT
Start: 2018-05-11 | End: 2018-05-11 | Stop reason: HOSPADM

## 2018-05-11 RX ORDER — SODIUM CHLORIDE 9 MG/ML
INJECTION, SOLUTION INTRAVENOUS CONTINUOUS
Status: DISCONTINUED | OUTPATIENT
Start: 2018-05-11 | End: 2018-05-11 | Stop reason: HOSPADM

## 2018-05-11 RX ADMIN — SODIUM CHLORIDE 1.5 ML/KG/HR: 0.9 INJECTION, SOLUTION INTRAVENOUS at 10:05

## 2018-05-11 RX ADMIN — SODIUM CHLORIDE: 0.9 INJECTION, SOLUTION INTRAVENOUS at 06:05

## 2018-05-11 NOTE — INTERVAL H&P NOTE
The patient has been examined and the H&P has been reviewed:        Anesthesia/Surgery risks, benefits and alternative options discussed and understood by patient/family.          Active Hospital Problems    Diagnosis  POA    *Chronic venous insufficiency [I87.2]  Yes     Chronic       Venous ultrasound 2/2017     R GSV 4 mm without reflux   R LSV 5 mm with 1168 msec reflux       L GSV 6 mm without reflux   L LSV 5 mm with 860 msec reflux          Venous insufficiency of lower extremity [I87.2]  Yes     Priority: Low    Venous insufficiency of both lower extremities [I87.2]  Yes     Priority: Low    HIV positive [Z21]  Yes    Hyperlipidemia [E78.5]  Yes    Hypertension [I10]  Yes      Resolved Hospital Problems    Diagnosis Date Resolved POA   No resolved problems to display.

## 2018-05-11 NOTE — NURSING
8F  venous  sheath pulled from L groin.  Alfredo pressure held for 15 minutes.  Gauze with tegaderm applied.  CDI no hematoma or bleeding noted.

## 2018-05-11 NOTE — NURSING
8F venous sheath pulled from right groin at 11:45.  Alfredo pressure helf for 15 minutes.  Gauze dressing with tegaderm applied.  CDI no hematoma or bleeding noted.

## 2018-05-11 NOTE — NURSING
Patient discharged to home as ordered. All discharge instructions and  printed materials given to patient. Bilateral groin sites CDI no hematoma or bleeding noted. PIV d/c.  Tip in tact.  Patient verbalized understanding of and agreement with all discharge instructions given.

## 2018-05-11 NOTE — PROGRESS NOTES
S/p venogram       R EIV 55.6% stenosis    CSA 69 mm2   Normal 156 mm2        R EIV PTV with 20 x 70 mm Wallstent  Post  mm2        L CIV 75.1% stenosis   CSA 65.9 mm2   Normal 235 mm2        L CIV + EIV PTV with 22 x 70 + 16 x 60 mm Wallstents  Post stenotic  mm2          He tolerated procedure well         Full report to follow

## 2018-05-11 NOTE — NURSING
Patient arrived to recovery cath lab.  Pt drowsy but able to follow commands.  VSS.  10F bilateral venous sheaths in place. Last .  Will pull sheaths when ACT<180.   site is CDI, site soft, no bleeding or hematoma noted.  Fall risk precautions given and patients acknowledges.  Will continue to monitor

## 2018-05-11 NOTE — PLAN OF CARE
Patient lying in bed with no complaints of pain or distress noted.  Monitors applied.  VSS.  Yellow non- skid socks placed on patient. Fall risk review with patient.  Procedure and recovery process explained to patient and all questions answered.  Patient verbalized understanding.  Will continue to monitor.

## 2018-05-15 ENCOUNTER — TELEPHONE (OUTPATIENT)
Dept: PHARMACY | Facility: CLINIC | Age: 78
End: 2018-05-15

## 2018-05-16 LAB — PERIPHERAL STENOSIS: ABNORMAL

## 2018-05-17 LAB
POC ACTIVATED CLOTTING TIME K: 186 SEC (ref 74–137)
POC ACTIVATED CLOTTING TIME K: 197 SEC (ref 74–137)
SAMPLE: ABNORMAL
SAMPLE: ABNORMAL

## 2018-05-24 NOTE — DISCHARGE SUMMARY
[facility]  Cardiology  Discharge Summary      Patient Name: Osorio Boggs  MRN: [id number]  Admission Date: 5/11/2018  Hospital Length of Stay: 0 days  Discharge Date and Time: 5/11/2018  Attending Physician: Carmen att. providers found  Discharging Provider: Manuel Metzger MD  Primary Care Physician: Keaton Adan MD    HPI:     Narrative        Date of Procedure:  05/11/2018    A. Indication/Pre-Operative Diagnosis     The patient is a 77 year old male that was referred for catheterization by Keaton Adan for venous insufficiency with refractory edema.     B. Summary/Post-Operative Diagnosis       S/p venogram + IV guided intervention for refractory bilateral edema.    Right EIV 55.6% stenosis + CSA 69 mm2 (Normal  mm2).    Right EIV PTV with 20 x 70 mm Wallstent with post PTV  mm2.    Left CIV 75.1% stenosis + CSA 65.9 mm2 (Normal  mm2).    Left CIV + EIV PTV with 22 x 70 + 16 x 60 mm Wallstents.    Post stenotic CIV  mm2 and EIV  mm2.    C. HPI     I have reviewed the history and physical completed on 05/11/2018. The patient has been examined and the following changes have been noted:        Patient ID:  Osorio Boggs is a 77 y.o. male who presents for follow up of Coronary Artery Disease; Hyperlipidemia; Hypertension; and Chronic Venous Insufficiency        HPI:         He is here to follow up and sign consent for bilateral venogram for further evaluation and treatment of venous insufficiency. Ultrasound in 2/2017 revealed bilateral LSV reflux. Repeat study 2/2018 only revealed R LSV reflux even though both GSV are   dilated-without reflux. EVLT of R LSV was aborted-too small and his symptoms were out of proportion to ultrasound. He will proceed with bilateral deep venogram to assess for iliac vein compression.           He has CAD s/p PCI LAD, LCX, and RCA (2017)-CTS turned down for CABG, HTN, HLP, HIV, PAD-no claudication, and venous insufficiency  presenting with complaints of recurrent leg edema, L >> R. R leg ulcers healed with wound care. He wears compression   stockings and exercises regularly.         He was experiencing CCS III angina, and Dr. Adan order a stress test, which is abnormal with intermediate risk result.  Underwent cardiac cath in June - 3V CAD.  PCI of LAD and PTCA of RCA performed.  STill with LCX disease and RCA disease not stented   due to insufficient stent size for RCA.  Returned for PCI of proximal RCA with BMS and DONIS of proximal LAD. Symptoms resolved.            Arterial U/S indicated monophasic wave forms of RCFA and some abnormal waveforms in popliteal system of RLE. Patient denies claudication symptoms.              Venous ultrasound 2/2017                 R GSV 4 mm without reflux              R LSV 5 mm with 1168 msec reflux                    L GSV 6 mm without reflux              L LSV 5 mm with 860 msec reflux           Venous ultrasound                             R GSV 8 mm without reflux              R LSV 4 mm with 1620 msec reflux                    L GSV 6 mm without reflux              L LSV 3 mm without reflux                 Patient history was obtained from the patient.     Counseling: The patient was counseled regarding the potential risks and benefits of this procedure, as well as possible alternative approaches to the problem and gave informed consent.    The ASA Score was Class II.     [facility] Risk Score for MACE is 4.40%. [facility] Risk Score for Death is 0.90%.     Height: 72 in.      Weight: 91 lbs.      BMI: 12.30 kg/m2    OUTPATIENT MEDICATIONS: Medications were reviewed.  ALLERGIES: Allergies were reviewed.    Laboratory data revealed:     05/11/2018 CREAT  1.9, GLU  106, HCT  32.1, HGB  10.7, INR  1.0, K  5.1, NA  137, PLT  190, PTI  10.2, WBCIR  6.50, BUN  44            Manual Labs:  ACT Reference Range:  sec, ACT-PLUS cartridge Cardiopulmonary Bypass: 410-440 sec, ACT-LR cartridge  Indwelling Vascular sheath Removal:  sec  05/11/2018 09:12  , 09:37  , 09:57        D. Hemodynamic Results     BP: 136/77    E. Angiographic Results     Diagnostic:        - Left Common Iliac Vein:             IVUS showed the Left Common Iliac Vein has a 75% stenosis and has a cross sectional area of 66.0 sq mm. Normal  mm2.     - Right Common Iliac Vein:             IVUS showed the Right Common Iliac Vein has luminal irregularities.     - Left External Iliac Vein:             IVUS showed the Left External Iliac Vein has a 75% stenosis.     - Right External Iliac Vein:             IVUS showed the Right External Iliac Vein has a cross sectional area of 69.0 sq mm. 56% stenosis. Normal  mm2     - Common Femoral Vein:             IVUS showed the bilateral CFVs are normal.      Intervention          Left Common Iliac Vein:              The lesion was successfully intervened. Post-stenosis of 0% and post-MIN 3 flow. The vessel was accessed natively.  The following items were used: Blln [place] Gold 18 X 60 and Stent Wallstent 22 X 70 X75.       Left External Iliac Vein:              The lesion was successfully intervened. Post-stenosis of 0% and post-MIN 3 flow. The vessel was accessed natively.  The following items were used: Wallstent 16 X 60 X  75.       Right External Iliac Vein:              The lesion was successfully intervened. Post-stenosis of 0% and post-MIN 3 flow. The vessel was accessed natively.  The following items were used: Wallstent 20 X 80 X 75.    F. Details of Procedure     PROCEDURES PERFORMED: IVUS, Iliac Stents - Bilateral Venous and Venogram - Cath Lab Procedure    ANESTHESIA: Conscious sedation was achieved with 150 mcg of FENTANYL 100MCG/2ML. Local anesthesia was achieved with 20 ml of Lidocaine 1%. Moderate conscious sedation was performed and cardiorespiratory functions were monitored the entire procedure by   Gianluca Pacheco RN. Sedation began at 08:35  AM and concluded at 09:59 AM, totalling 84.0 minutes.     PRIMARY SURGEON: Manuel Metzger MD    COMPLICATIONS: There were no complications.    Medications given on sterile field: Lidocaine 1% (20 ml).    Medications given during procedure: Heparin 1000u/500cc Bag (3 bags), Midazolam 2 Ml Inj / 10 Mg (2 mg), Fentanyl 100mcg/2ml (150 mcg), Phenergan 25 Mg/ml (12.5 mg), Heparin 1000u/ml Inj (21898 units) and Hydromorphone (1 mg).    The patient was brought to the catheterization laboratory after premedication with oral Benadryl 50 mg. Bilateral groin prepped and draped. The Kit, Manifold was flushed and connected to contrast. After local anesthesia, a Sheath 4fr X 7cm Micropuncture   was inserted into the right Femoral vein. Sheath 4fr X 7cm Micropuncture was exchanged over the wire for a. A Sheath 6fr X 11cm was inserted into the right Femoral vein. A Sheath 4fr X 7cm Micropuncture was inserted into the left Femoral vein. Sheath 4fr   X 7cm Micropuncture was exchanged over the wire for a. Sheath 6fr X 11cm was exchanged over the wire for a Sheath Brite Tip 8fr X 35cm. A Sheath 6fr X 11cm was inserted into the left Femoral vein. A Wire J .035 X 150 was inserted into the left Femoral   vein. Sheath 6fr X 11cm was exchanged over the wire for a Sheath Brite Tip 8fr X 35cm. The Wire J .035 X 150 was removed. A Wire Amplatz Ss .035 X 180cm was inserted into the right femoral artery. Selective angiography performed in the left Femoral vein   and unilateral runoff performed. The Wire Amplatz Ss .035 X 180cm was removed. A Wire Reg Ex Angled .035 X 260 was inserted into the right Femoral vein. A Cath 4fr Mpa 2 was inserted into the right Femoral vein. The Wire Reg Ex Angled .035 X 260 was   removed.     INFERIOR VENA CAVA    A Wire Amplatz Ss .035 X 180cm was inserted into the infra renal Inferior Vena Cava.     Left  COMMON ILIAC VEIN    A Wire Reg Ex Angled .035 X 260 was inserted into the Left Common Iliac Vein. The Cath 4fr  Mpa 2 was removed. A Cath 4fr Mpa 2 was inserted into the Left Common Iliac Vein. The Wire Reg Ex Angled .035 X 260 was removed. A Wire Amplatz Ss .035 X 180cm was   inserted into the Left Common Iliac Vein. The [employer] was inserted into the left Inferior Vena Cava. The [employer] [facility] [address] was removed. Sheath Brite Tip 8fr X 35cm was exchanged over the wire for a Sheath Brite   Tip 10fr X 23cm. A Blln [place] Gold 18 X 60 was inserted into the Left Common Iliac Vein and was inflated with indeflator at 8.0 kristi. for 2.0 mins. The Cath 4fr Mpa 2 was removed. Stent balloon removed. A Blln [place] 12 X 40 X 75 was inserted into the   Right Internal Iliac Vein and was inflated with indeflator at 10.0 kristi. for 30.0 secs. The Blln [place] 12 X 40 X 75 was removed. The [employer] [facility] [address] was reinserted into the left Left Common Iliac Vein and ultrasound performed. A Stent   Wallstent 22 X 70 X75 was inserted into the Left Common Iliac Vein. A Stent Wallstent 22 X 70 X75 was deployed into the Left Common Iliac Vein. A Wallstent 16 X 60 X  75 was inserted into the Left External Iliac Vein. A Wallstent 16 X 60 X  75 was   deployed into the Left External Iliac Vein.     Right  COMMON ILIAC VEIN    A Wallstent 20 X 80 X 75 was inserted into the Right Common Iliac Vein. A Wallstent 20 X 80 X 75 was deployed into the Right Common Iliac Vein. The Wire Amplatz Ss .035 X 180cm was removed.     The Sheath Brite Tip 10fr X 23cm was removed. Total Visipaque injected was 40.0 ml. Total Visipaque used was 150.0 ml.       Fluoroscopy Time 14.8 minutes   Radiation Dose 147.1 mGy   Contrast Injected 40 ml Visipaque   Contrast Used  150 ml Visipaque            Procedure log documented by Kristin Lejeune, RT(R) and JANIA Rodriguez and verified by Manuel Metzger MD    ESTIMATED BLOOD LOSS is < 50 cc.    SPECIMEN: No specimen.     G. Recommendations     1. ASA 81mg.  2. Plavix 75 mg daily for 3  months  3. Compression stockings    I certify that I was present for catheter insertion, catheter manipulation, angiography, angiographic interpretation, and all interventional procedures performed on this patient.     This document has been electronically          Procedure(s) (LRB):  Venogram (Bilateral)     Indwelling Lines/Drains at time of discharge:  Lines/Drains/Airways          No matching active lines, drains, or airways          Hospital Course see HPI    Consults: none    Significant Diagnostic Studies: labs     Pending Diagnostic Studies:     None          Final Active Diagnoses:    Diagnosis Date Noted POA    PRINCIPAL PROBLEM:  Chronic venous insufficiency [I87.2] 03/14/2017 Yes     Chronic    Venous insufficiency of lower extremity [I87.2] 05/10/2018 Yes    Venous insufficiency of both lower extremities [I87.2] 04/25/2018 Yes    HIV positive [Z21] 02/09/2017 Yes    Hyperlipidemia [E78.5] 02/08/2017 Yes    Hypertension [I10] 02/08/2017 Yes      Problems Resolved During this Admission:    Diagnosis Date Noted Date Resolved POA       Discharged Condition: stable     Follow Up:  Follow-up Information     Manuel Metzger MD.    Specialties:  Cardiology, INTERVENTIONAL CARDIOLOGY  Contact information:  [address] [place] [address]  [address] [place] [id number]  [phone]                 Patient Instructions:       DC home      Follow up with PCP      Follow up with Dr. Metzger or primary cardiologist as scheduled      Cardiac diet      Resume normal activities in 3 days      Medications:      Discharge Medication List as of 5/11/2018 12:19 PM      START taking these medications    Details   rivaroxaban (XARELTO) 20 mg Tab Take 1 tablet (20 mg total) by mouth daily with dinner or evening meal., Starting Mon 5/14/2018, Normal         CONTINUE these medications which have NOT CHANGED    Details   abacavir-lamivudine (EPZICOM) 600-300 mg per tablet Take 1 tablet by mouth once daily., Until Discontinued, Historical  Med      alirocumab 150 mg/mL PnIj Inject 1 Syringe into the skin every 14 (fourteen) days., Starting Wed 3/21/2018, Normal      amLODIPine (NORVASC) 5 MG tablet Take 1 tablet (5 mg total) by mouth once daily., Starting Mon 3/12/2018, Until Tue 3/12/2019, Normal      brimonidine 0.1% (ALPHAGAN P) 0.1 % Drop Place 1 drop into both eyes 2 (two) times daily. , Until Discontinued, Historical Med      CHOLECALCIFEROL, VITAMIN D3, (VITAMIN D3 ORAL) Take 50,000 Units by mouth once a week. , Until Discontinued, Historical Med      clopidogrel (PLAVIX) 75 mg tablet Take 1 tablet (75 mg total) by mouth once daily., Starting Tue 6/20/2017, Until Wed 6/20/2018, Normal      efavirenz (SUSTIVA) 600 mg Tab Take 600 mg by mouth every evening., Until Discontinued, Historical Med      EVOTAZ 300-150 mg Tab Take 1 tablet by mouth once daily., Starting 4/18/2016, Until Discontinued, Historical Med      latanoprost 0.005 % ophthalmic solution 1 drop every evening., Until Discontinued, Historical Med      olmesartan (BENICAR) 40 MG tablet Take 40 mg by mouth once daily., Until Discontinued, Historical Med         STOP taking these medications       aspirin (ECOTRIN) 81 MG EC tablet Comments:   Reason for Stopping:                  Time spent on the discharge of patient: 35 minutes    Manuel Metzger MD  Cardiology  [facility]

## 2018-06-13 ENCOUNTER — TELEPHONE (OUTPATIENT)
Dept: PHARMACY | Facility: CLINIC | Age: 78
End: 2018-06-13

## 2018-06-14 ENCOUNTER — TELEPHONE (OUTPATIENT)
Dept: CARDIOLOGY | Facility: CLINIC | Age: 78
End: 2018-06-14

## 2018-06-14 DIAGNOSIS — I87.2 VENOUS INSUFFICIENCY OF BOTH LOWER EXTREMITIES: Primary | ICD-10-CM

## 2018-06-14 NOTE — TELEPHONE ENCOUNTER
----- Message from Zahra Messina sent at 6/14/2018  8:16 AM CDT -----  Contact: Mr Boggs  cell [phone]  Pt came in the [place] office to ask about a f/u visit . Pt states both his legs are still swollen from the procedure in May.

## 2018-06-18 ENCOUNTER — TELEPHONE (OUTPATIENT)
Dept: CARDIOLOGY | Facility: CLINIC | Age: 78
End: 2018-06-18

## 2018-06-18 NOTE — TELEPHONE ENCOUNTER
----- Message from Sari Mendez sent at 6/18/2018  1:26 PM CDT -----  Contact: Self [phone]  Patient is calling to talk to nurse concerning his orders for his ultrasound. Please advice

## 2018-06-18 NOTE — TELEPHONE ENCOUNTER
----- Message from Makenzie Pereira sent at 6/18/2018 10:46 AM CDT -----  Contact: self / [phone]  Patient is requesting a call back regarding, his ultrasound. Please advise

## 2018-06-21 ENCOUNTER — HOSPITAL ENCOUNTER (OUTPATIENT)
Dept: RADIOLOGY | Facility: HOSPITAL | Age: 78
Discharge: HOME | End: 2018-06-21
Attending: INTERNAL MEDICINE
Payer: MEDICARE

## 2018-06-21 DIAGNOSIS — I87.2 VENOUS INSUFFICIENCY OF BOTH LOWER EXTREMITIES: ICD-10-CM

## 2018-06-21 PROCEDURE — 93970 EXTREMITY STUDY: CPT | Mod: TC,PO

## 2018-06-25 RX ORDER — CLOPIDOGREL BISULFATE 75 MG/1
TABLET ORAL
Qty: 30 TABLET | Refills: 11 | Status: SHIPPED | OUTPATIENT
Start: 2018-06-25 | End: 2019-07-12 | Stop reason: SDUPTHER

## 2018-06-27 ENCOUNTER — OFFICE VISIT (OUTPATIENT)
Dept: CARDIOLOGY | Facility: CLINIC | Age: 78
End: 2018-06-27
Payer: MEDICARE

## 2018-06-27 VITALS
HEIGHT: 73 IN | DIASTOLIC BLOOD PRESSURE: 59 MMHG | HEART RATE: 70 BPM | BODY MASS INDEX: 27.3 KG/M2 | SYSTOLIC BLOOD PRESSURE: 108 MMHG | WEIGHT: 206 LBS

## 2018-06-27 DIAGNOSIS — I73.9 PAD (PERIPHERAL ARTERY DISEASE): ICD-10-CM

## 2018-06-27 DIAGNOSIS — I87.2 VENOUS INSUFFICIENCY OF BOTH LOWER EXTREMITIES: ICD-10-CM

## 2018-06-27 DIAGNOSIS — I87.2 CHRONIC VENOUS INSUFFICIENCY: Primary | Chronic | ICD-10-CM

## 2018-06-27 DIAGNOSIS — I25.10 CORONARY ARTERY DISEASE INVOLVING NATIVE CORONARY ARTERY OF NATIVE HEART WITHOUT ANGINA PECTORIS: ICD-10-CM

## 2018-06-27 DIAGNOSIS — I87.2 VENOUS INSUFFICIENCY OF LOWER EXTREMITY, UNSPECIFIED LATERALITY: ICD-10-CM

## 2018-06-27 DIAGNOSIS — E78.2 MIXED HYPERLIPIDEMIA: ICD-10-CM

## 2018-06-27 PROCEDURE — 3074F SYST BP LT 130 MM HG: CPT | Mod: CPTII,S$GLB,, | Performed by: INTERNAL MEDICINE

## 2018-06-27 PROCEDURE — 99215 OFFICE O/P EST HI 40 MIN: CPT | Mod: S$GLB,,, | Performed by: INTERNAL MEDICINE

## 2018-06-27 PROCEDURE — 99999 PR PBB SHADOW E&M-EST. PATIENT-LVL III: CPT | Mod: PBBFAC,,, | Performed by: INTERNAL MEDICINE

## 2018-06-27 PROCEDURE — 3078F DIAST BP <80 MM HG: CPT | Mod: CPTII,S$GLB,, | Performed by: INTERNAL MEDICINE

## 2018-06-27 RX ORDER — EMTRICITABINE AND TENOFOVIR ALAFENAMIDE 200; 25 MG/1; MG/1
TABLET ORAL
COMMUNITY
Start: 2018-06-21 | End: 2019-01-09

## 2018-06-27 NOTE — PROGRESS NOTES
Subjective:   Patient ID:  Osorio Boggs is a 77 y.o. male who presents for follow up of Chronic Venous Insufficiency; Leg Swelling; Hyperlipidemia; Hypertension; and Coronary Artery Disease      HPI:       He is here to follow up after R EIV and R CIV + EIV venous intervention for venous insufficiency. He is complaining of edema despite the intervention.         Ultrasound in 2/2017 revealed bilateral LSV reflux. Repeat study 2/2018 only revealed R LSV reflux even though both GSV are dilated-without reflux. EVLT of R LSV was aborted-too small and his symptoms were out of proportion to ultrasound. He will proceed with bilateral deep venogram to assess for iliac vein compression.        He has CAD s/p PCI LAD, LCX, and RCA (2017)-CTS turned down for CABG, HTN, HLP, HIV, PAD-no claudication, and venous insufficiency presenting with complaints of recurrent leg edema, L >> R. R leg ulcers healed with wound care. He wears compression stockings and exercises regularly.       He was experiencing CCS III angina, and Dr. Adan order a stress test, which is abnormal with intermediate risk result.  Underwent cardiac cath in June - 3V CAD.  PCI of LAD and PTCA of RCA performed. Still has LCX disease and RCA disease not stented due to insufficient stent size for RCA.  Returned for PCI of proximal RCA with BMS and DONIS of proximal LAD. Symptoms resolved.           Arterial U/S indicated monophasic wave forms of RCFA and some abnormal waveforms in popliteal system of RLE. Patient denies claudication symptoms.            Venous ultrasound 2/2017     R GSV 4 mm without reflux   R LSV 5 mm with 1168 msec reflux       L GSV 6 mm without reflux   L LSV 5 mm with 860 msec reflux        Venous ultrasound 2/2018      R GSV 8 mm without reflux   R LSV 4 mm with 1620 msec reflux       L GSV 6 mm without reflux   L LSV 3 mm without reflux       Venous ultrasound 6/2018     No superficial reflux   + deep venous reflux   No DVT                    Patient Active Problem List    Diagnosis Date Noted    Venous insufficiency of lower extremity 05/10/2018     Priority: Low    Venous insufficiency of both lower extremities 2018     Priority: Low         2018:        S/p venogram + IV guided intervention for refractory bilateral edema.    Right EIV 55.6% stenosis + CSA 69 mm2 (Normal  mm2).    Right EIV PTV with 20 x 70 mm Wallstent with post PTV  mm2.    Left CIV 75.1% stenosis + CSA 65.9 mm2 (Normal  mm2).    Left CIV + EIV PTV with 22 x 70 + 16 x 60 mm Wallstents.    Post stenotic CIV  mm2 and EIV  mm2.      Statin intolerance 10/03/2017    Coronary artery disease involving native coronary artery of native heart without angina pectoris 2017    Azotemia 2017    Chronic venous insufficiency 2017       Venous ultrasound 2017     R GSV 4 mm without reflux   R LSV 5 mm with 1168 msec reflux       L GSV 6 mm without reflux   L LSV 5 mm with 860 msec reflux      Deep venous intervention 2018      S/p venogram         R EIV 55.6% stenosis               CSA 69 mm2              Normal 156 mm2           R EIV PTV with 20 x 70 mm Wallstent  Post  mm2           L CIV 75.1% stenosis              CSA 65.9 mm2              Normal 235 mm2           L CIV + EIV PTV with 22 x 70 + 16 x 60 mm Wallstents  Post stenotic  mm2             HIV positive 2017    PAD (peripheral artery disease) 2017    Hypertension 2017    Hyperlipidemia 2017    Ulcer of right lower extremity with fat layer exposed 2017    Venous stasis 2017    Glaucoma 2015           Right Arm BP - Sittin/59  Left Arm BP - Sittin/59        LABS    Lipid panel  Lab Results   Component Value Date    CHOL 224 (H) 2018    CHOL 238 (H) 10/17/2017     Lab Results   Component Value Date    HDL 60 2018    HDL 67 10/17/2017     Lab Results   Component  Value Date    LDLCALC 144.2 03/20/2018    LDLCALC 160.2 (H) 10/17/2017     Lab Results   Component Value Date    TRIG 99 03/20/2018    TRIG 54 10/17/2017     Lab Results   Component Value Date    CHOLHDL 26.8 03/20/2018    CHOLHDL 28.2 10/17/2017            Review of Systems   Constitution: Negative for diaphoresis, weakness, night sweats, weight gain and weight loss.   HENT: Negative for congestion.    Eyes: Negative for blurred vision, discharge and double vision.   Cardiovascular: Positive for leg swelling. Negative for chest pain, claudication, cyanosis, dyspnea on exertion, irregular heartbeat, near-syncope, orthopnea, palpitations, paroxysmal nocturnal dyspnea and syncope.   Respiratory: Negative for cough, shortness of breath and wheezing.    Endocrine: Negative for cold intolerance, heat intolerance and polyphagia.   Hematologic/Lymphatic: Negative for adenopathy and bleeding problem. Does not bruise/bleed easily.   Skin: Negative for dry skin and nail changes.   Musculoskeletal: Negative for arthritis, back pain, falls, joint pain, myalgias and neck pain.   Gastrointestinal: Negative for bloating, abdominal pain, change in bowel habit and constipation.   Genitourinary: Negative for bladder incontinence, dysuria, flank pain, genital sores and missed menses.   Neurological: Negative for aphonia, brief paralysis, difficulty with concentration and dizziness.   Psychiatric/Behavioral: Negative for altered mental status and memory loss. The patient does not have insomnia.    Allergic/Immunologic: Negative for environmental allergies.       Objective:   Physical Exam   Constitutional: He is oriented to person, place, and time. He appears well-developed and well-nourished. He is not intubated.   HENT:   Head: Normocephalic and atraumatic.   Right Ear: External ear normal.   Left Ear: External ear normal.   Mouth/Throat: Oropharynx is clear and moist.   Eyes: Conjunctivae and EOM are normal. Pupils are equal, round,  and reactive to light. Right eye exhibits no discharge. Left eye exhibits no discharge. No scleral icterus.   Neck: Normal range of motion. Neck supple. Normal carotid pulses, no hepatojugular reflux and no JVD present. Carotid bruit is not present. No tracheal deviation present. No thyromegaly present.   Cardiovascular: Normal rate, regular rhythm, S1 normal and S2 normal.   No extrasystoles are present. PMI is not displaced.  Exam reveals no gallop, no S3, no distant heart sounds, no friction rub and no midsystolic click.    No murmur heard.  Pulses:       Carotid pulses are 2+ on the right side, and 2+ on the left side.       Radial pulses are 2+ on the right side, and 2+ on the left side.        Femoral pulses are 2+ on the right side, and 2+ on the left side.       Popliteal pulses are 2+ on the right side, and 2+ on the left side.        Dorsalis pedis pulses are 1+ on the right side, and 1+ on the left side.        Posterior tibial pulses are 1+ on the right side, and 1+ on the left side.   Pulmonary/Chest: Effort normal and breath sounds normal. No accessory muscle usage or stridor. No apnea, no tachypnea and no bradypnea. He is not intubated. No respiratory distress. He has no decreased breath sounds. He has no wheezes. He has no rales. He exhibits no tenderness and no bony tenderness.   Abdominal: He exhibits no distension, no pulsatile liver, no abdominal bruit, no ascites, no pulsatile midline mass and no mass. There is no tenderness. There is no rebound and no guarding.   Musculoskeletal: Normal range of motion. He exhibits no edema or tenderness.   Lymphadenopathy:     He has no cervical adenopathy.       2 + L leg and 1+ R leg edema         Neurological: He is alert and oriented to person, place, and time. He has normal reflexes. No cranial nerve deficit. Coordination normal.   Skin: Skin is warm. No rash noted. No erythema. No pallor.     R shin-old scars with healed ulcers    Psychiatric: He has a  normal mood and affect. His behavior is normal. Judgment and thought content normal.       Assessment:     1. Chronic venous insufficiency    2. Venous insufficiency of both lower extremities    3. PAD (peripheral artery disease)    4. Coronary artery disease involving native coronary artery of native heart without angina pectoris    5. Venous insufficiency of lower extremity, unspecified laterality    6. Mixed hyperlipidemia        Plan:         Repeat venous ultrasound   Assess for reflux  His presentation is concerning for superficial reflux  Further recommendations to follow      Continue with DOAC for L POP DVT   It appears chronic not previously seen   Edema has been present prior to the DVT seen in 4/2018         Compression stockings-20-30 mmHg  Elevate legs when resting  Exercise           Medical therapy for CAD and PAD   Aspirin   Plavix   Repatha   Will add acei if BP will tolerate        Continue with current medical plan and lifestyle changes.  Return sooner for concerns or questions. If symptoms persist go to the ED  I have reviewed all pertinent data on this patient     I have reviewed the patient's medical history in detail and updated the computerized patient record.    Orders Placed This Encounter   Procedures    Cardiology Lab US Lower Extremity Veins Bilateral     Standing Status:   Future     Standing Expiration Date:   7/12/2019     Order Specific Question:   Reason for Exam:     Answer:   venous reflux study       Follow up as scheduled. Return sooner for concerns or questions        He expressed verbal understanding and agreed with the plan      Greater than 50% of the visit of 45 minutes was spent counseling, educating, and coordinating the care of the patient.          Patient's Medications   New Prescriptions    No medications on file   Previous Medications    ABACAVIR-LAMIVUDINE (EPZICOM) 600-300 MG PER TABLET    Take 1 tablet by mouth once daily.    ALIROCUMAB 150 MG/ML PNIJ    Inject  1 Syringe (150 mg) into the skin every 14 (fourteen) days.    AMLODIPINE (NORVASC) 5 MG TABLET    Take 1 tablet (5 mg total) by mouth once daily.    BRIMONIDINE 0.1% (ALPHAGAN P) 0.1 % DROP    Place 1 drop into both eyes 2 (two) times daily.     CHOLECALCIFEROL, VITAMIN D3, (VITAMIN D3 ORAL)    Take 50,000 Units by mouth once a week.     CLOPIDOGREL (PLAVIX) 75 MG TABLET    TAKE ONE TABLET BY MOUTH ONCE DAILY    DESCOVY 200-25 MG TAB        EFAVIRENZ (SUSTIVA) 600 MG TAB    Take 600 mg by mouth every evening.    EVOTAZ 300-150 MG TAB    Take 1 tablet by mouth once daily.    LATANOPROST 0.005 % OPHTHALMIC SOLUTION    1 drop every evening.    OLMESARTAN (BENICAR) 40 MG TABLET    Take 40 mg by mouth once daily.    RIVAROXABAN (XARELTO) 20 MG TAB    Take 1 tablet (20 mg total) by mouth daily with dinner or evening meal.   Modified Medications    Modified Medication Previous Medication    CLOPIDOGREL (PLAVIX) 75 MG TABLET clopidogrel (PLAVIX) 75 mg tablet       TAKE ONE TABLET BY MOUTH ONCE DAILY    Take 1 tablet (75 mg total) by mouth once daily.   Discontinued Medications    No medications on file

## 2018-06-28 ENCOUNTER — TELEPHONE (OUTPATIENT)
Dept: CARDIOLOGY | Facility: CLINIC | Age: 78
End: 2018-06-28

## 2018-06-28 NOTE — TELEPHONE ENCOUNTER
----- Message from Manuel Metzger MD sent at 6/27/2018 12:58 PM CDT -----      [place] [facility] [place] [facility]      ZN

## 2018-07-03 RX ORDER — CLOPIDOGREL BISULFATE 75 MG/1
TABLET ORAL
Qty: 30 TABLET | Refills: 11 | Status: SHIPPED | OUTPATIENT
Start: 2018-07-03 | End: 2018-10-15 | Stop reason: SDUPTHER

## 2018-07-10 ENCOUNTER — TELEPHONE (OUTPATIENT)
Dept: PHARMACY | Facility: CLINIC | Age: 78
End: 2018-07-10

## 2018-07-10 NOTE — TELEPHONE ENCOUNTER
Praluent follow up and  refill confirmed. We will ship Praluent refill on 18 via fedex to arrive on 18. $0.00 copay- 004. Confirmed 2 patient identifiers - name and .      Patient self-administers Praluent every other Thursday with no difficulties.  He has zero doses on hand, next injection due .  Administration, dose, storage reviewed.  No side effects noted.  No missed doses, no new medications, no new allergies or health conditions reported at this time. All questions answered and addressed to patients satisfaction. Pt verbalized understanding.

## 2018-07-10 NOTE — TELEPHONE ENCOUNTER
Praluent PENS follow up and refill readiness attempted.  No answer.  LVM for call back.  $0 copay in 004.

## 2018-07-12 NOTE — PATIENT INSTRUCTIONS
Understanding Chronic Venous Insufficiency  Problems with the veins in the legs may lead to chronic venous insufficiency (CVI). CVI means that there is a long-term problem with the veins not being able to pump blood back to your heart. When this happens, blood stays in the legs and causes swelling and aching.   Two problems that may lead to chronic venous insufficiency are:  · Damaged valves. Valves keep blood flowing from the legs through the blood vessels and back to the heart. When the valves are damaged, blood does not flow as well.   · Deep vein thrombosis (DVT). Blood clots may form in the deep veins of the legs. This may cause pain, redness, and swelling in the legs. It may also block the flow of blood back to the heart. Seek immediate medical care if you have these symptoms.  · A blood clot in the leg can also break off and travel to the lungs. This is called pulmonary embolism (PE). In the lungs, the clot can cut off the flow of blood. This may cause chest pain, trouble breathing, sweating, a fast heartbeat, coughing (may cough up blood), and fainting. It is a medical emergency and may cause death. Call 911 if you have these symptoms.  · Healthcare providers call the two conditions, DVT and PE, venous thromboembolism (VTE).  CVI cant be cured, but you can control leg swelling to reduce the likelihood of ulcers (sores).  Recognizing the symptoms  Be aware of the following:  · If you stand or sit with your feet down for long periods, your legs may ache or feel heavy.  · Swollen ankles are possibly the most common symptom of CVI.  · As swelling increases, the skin over your ankles may show red spots or a brownish tinge. The skin may feel leathery or scaly, and may start to itch.  · If swelling is not controlled, an ulcer (open wound) may form.  What you can do  Reduce your risk of developing ulcers by doing the following:  · Increase blood flow back to your heart by elevating your legs, exercising daily,  and wearing elastic stockings.  · Boost blood flow in your legs by losing excess weight.  · If you must stand or sit in one place for a period of time, keep your blood moving by wiggling your toes, shifting your body position, and rising up on the balls of your feet.    Date Last Reviewed: 5/1/2016  © [id number] [employer]. [address] [place] [address], [place] [address]. All rights reserved. This information is not intended as a substitute for professional medical care. Always follow your healthcare professional's instructions.

## 2018-07-27 ENCOUNTER — CLINICAL SUPPORT (OUTPATIENT)
Dept: CARDIOLOGY | Facility: CLINIC | Age: 78
End: 2018-07-27
Attending: INTERNAL MEDICINE
Payer: MEDICARE

## 2018-07-27 DIAGNOSIS — I87.2 CHRONIC VENOUS INSUFFICIENCY: Chronic | ICD-10-CM

## 2018-07-27 PROCEDURE — 93970 EXTREMITY STUDY: CPT | Mod: S$GLB,,, | Performed by: INTERNAL MEDICINE

## 2018-07-31 NOTE — PROGRESS NOTES
The ultrasound revealed some reflux that we treat and hopefully help with your swelling         Thanks        ZN

## 2018-08-07 ENCOUNTER — TELEPHONE (OUTPATIENT)
Dept: PHARMACY | Facility: CLINIC | Age: 78
End: 2018-08-07

## 2018-09-04 ENCOUNTER — TELEPHONE (OUTPATIENT)
Dept: PHARMACY | Facility: CLINIC | Age: 78
End: 2018-09-04

## 2018-09-12 ENCOUNTER — OFFICE VISIT (OUTPATIENT)
Dept: CARDIOLOGY | Facility: CLINIC | Age: 78
End: 2018-09-12
Payer: MEDICARE

## 2018-09-12 VITALS
BODY MASS INDEX: 27.14 KG/M2 | SYSTOLIC BLOOD PRESSURE: 109 MMHG | DIASTOLIC BLOOD PRESSURE: 67 MMHG | WEIGHT: 200.38 LBS | HEIGHT: 72 IN | HEART RATE: 66 BPM

## 2018-09-12 DIAGNOSIS — I10 ESSENTIAL HYPERTENSION: ICD-10-CM

## 2018-09-12 DIAGNOSIS — I25.10 CORONARY ARTERY DISEASE INVOLVING NATIVE CORONARY ARTERY OF NATIVE HEART WITHOUT ANGINA PECTORIS: ICD-10-CM

## 2018-09-12 DIAGNOSIS — E78.2 MIXED HYPERLIPIDEMIA: ICD-10-CM

## 2018-09-12 DIAGNOSIS — I87.2 VENOUS INSUFFICIENCY OF BOTH LOWER EXTREMITIES: Primary | ICD-10-CM

## 2018-09-12 PROCEDURE — 1101F PT FALLS ASSESS-DOCD LE1/YR: CPT | Mod: CPTII,,, | Performed by: INTERNAL MEDICINE

## 2018-09-12 PROCEDURE — 3078F DIAST BP <80 MM HG: CPT | Mod: CPTII,,, | Performed by: INTERNAL MEDICINE

## 2018-09-12 PROCEDURE — 99213 OFFICE O/P EST LOW 20 MIN: CPT | Mod: PBBFAC,PO | Performed by: INTERNAL MEDICINE

## 2018-09-12 PROCEDURE — 3074F SYST BP LT 130 MM HG: CPT | Mod: CPTII,,, | Performed by: INTERNAL MEDICINE

## 2018-09-12 PROCEDURE — 99999 PR PBB SHADOW E&M-EST. PATIENT-LVL III: CPT | Mod: PBBFAC,,, | Performed by: INTERNAL MEDICINE

## 2018-09-12 PROCEDURE — 99215 OFFICE O/P EST HI 40 MIN: CPT | Mod: S$PBB,,, | Performed by: INTERNAL MEDICINE

## 2018-09-12 RX ORDER — DIPHENHYDRAMINE HCL 25 MG
50 CAPSULE ORAL ONCE
Status: CANCELLED | OUTPATIENT
Start: 2018-09-12 | End: 2018-09-12

## 2018-09-12 RX ORDER — SODIUM CHLORIDE 9 MG/ML
INJECTION, SOLUTION INTRAVENOUS CONTINUOUS
Status: CANCELLED | OUTPATIENT
Start: 2018-09-12

## 2018-09-12 NOTE — PROGRESS NOTES
Subjective:   Patient ID:  Osorio Boggs is a 78 y.o. male who presents for follow up of Coronary Artery Disease; Hyperlipidemia; Hypertension; Venous Insufficiency; and Edema      HPI:       He is here to follow up after R EIV and R CIV + EIV venous intervention for venous insufficiency. He is complaining of edema despite the intervention.         Ultrasound in 2/2017 revealed bilateral LSV reflux. Repeat study 2/2018 only revealed R LSV reflux even though both GSV are dilated-without reflux. EVLT of R LSV was aborted-too small and his symptoms were out of proportion to ultrasound. He will proceed with bilateral deep venogram to assess for iliac vein compression.        He has CAD s/p PCI LAD, LCX, and RCA (2017)-CTS turned down for CABG, HTN, HLP, HIV, PAD-no claudication, and venous insufficiency presenting with complaints of recurrent leg edema, L >> R. R leg ulcers healed with wound care. He wears compression stockings and exercises regularly.       He was experiencing CCS III angina, and Dr. Adan order a stress test, which is abnormal with intermediate risk result.  Underwent cardiac cath in June - 3V CAD.  PCI of LAD and PTCA of RCA performed. Still has LCX disease and RCA disease not stented due to insufficient stent size for RCA.  Returned for PCI of proximal RCA with BMS and DONIS of proximal LAD. Symptoms resolved.           Arterial U/S indicated monophasic wave forms of RCFA and some abnormal waveforms in popliteal system of RLE. Patient denies claudication symptoms.            Venous ultrasound 2/2017     R GSV 4 mm without reflux   R LSV 5 mm with 1168 msec reflux       L GSV 6 mm without reflux   L LSV 5 mm with 860 msec reflux        Venous ultrasound 2/2018      R GSV 8 mm without reflux   R LSV 4 mm with 1620 msec reflux       L GSV 6 mm without reflux   L LSV 3 mm without reflux       Venous ultrasound 6/2018     No superficial reflux   + deep venous reflux   No DVT       US  7/27/2018    RIGHT:  No evidence of Right lower extremity DVT.    There is reflux > 500 msec in the Common Femoral, Femoral, Posterior Tibial, Peroneal, and External Iliac Veins.      LEFT:  No evidence of Left lower extremity DVT.    There is reflux > 500 msec in the Common Femoral, Femoral, Popliteal, Posterior Tibial, Peroneal, External Iliac, and Saphenofemoral Junction Veins.    GSV ak measures 0.29 cm.    GSV at SFJ measures 1.1 cm.    GSV prox measures 0.38 cm.      On the right there is an accessory GSV with reflux > 500 ms  Also noted reflux 837 ms in a . There are varicosities 0.73 in diameter    There is a left accessory without significant reflux.  There is a  with reflux of 651 ms and varicosity of 0.43.              Patient Active Problem List    Diagnosis Date Noted    Venous insufficiency of lower extremity 05/10/2018     Priority: Low    Venous insufficiency of both lower extremities 04/25/2018     Priority: Low         5/11/2018:        S/p venogram + IV guided intervention for refractory bilateral edema.    Right EIV 55.6% stenosis + CSA 69 mm2 (Normal  mm2).    Right EIV PTV with 20 x 70 mm Wallstent with post PTV  mm2.    Left CIV 75.1% stenosis + CSA 65.9 mm2 (Normal  mm2).    Left CIV + EIV PTV with 22 x 70 + 16 x 60 mm Wallstents.    Post stenotic CIV  mm2 and EIV  mm2.      Statin intolerance 10/03/2017    Coronary artery disease involving native coronary artery of native heart without angina pectoris 07/06/2017    Azotemia 03/20/2017    Chronic venous insufficiency 03/14/2017       Venous ultrasound 2/2017     R GSV 4 mm without reflux   R LSV 5 mm with 1168 msec reflux       L GSV 6 mm without reflux   L LSV 5 mm with 860 msec reflux      Deep venous intervention 5/11/2018      S/p venogram         R EIV 55.6% stenosis               CSA 69 mm2              Normal 156 mm2           R EIV PTV with 20 x 70 mm Wallstent  Post   mm2           L CIV 75.1% stenosis              CSA 65.9 mm2              Normal 235 mm2           L CIV + EIV PTV with 22 x 70 + 16 x 60 mm Wallstents  Post stenotic  mm2             HIV positive 2017    PAD (peripheral artery disease) 2017    Hypertension 2017    Hyperlipidemia 2017    Ulcer of right lower extremity with fat layer exposed 2017    Venous stasis 2017    Glaucoma 2015           Right Arm BP - Sittin/67  Left Arm BP - Sittin/66        LABS    Lipid panel  Lab Results   Component Value Date    CHOL 224 (H) 2018    CHOL 238 (H) 10/17/2017     Lab Results   Component Value Date    HDL 60 2018    HDL 67 10/17/2017     Lab Results   Component Value Date    LDLCALC 144.2 2018    LDLCALC 160.2 (H) 10/17/2017     Lab Results   Component Value Date    TRIG 99 2018    TRIG 54 10/17/2017     Lab Results   Component Value Date    CHOLHDL 26.8 2018    CHOLHDL 28.2 10/17/2017            Review of Systems   Constitution: Negative for diaphoresis, weakness, night sweats, weight gain and weight loss.   HENT: Negative for congestion.    Eyes: Negative for blurred vision, discharge and double vision.   Cardiovascular: Positive for leg swelling. Negative for chest pain, claudication, cyanosis, dyspnea on exertion, irregular heartbeat, near-syncope, orthopnea, palpitations, paroxysmal nocturnal dyspnea and syncope.   Respiratory: Negative for cough, shortness of breath and wheezing.    Endocrine: Negative for cold intolerance, heat intolerance and polyphagia.   Hematologic/Lymphatic: Negative for adenopathy and bleeding problem. Does not bruise/bleed easily.   Skin: Negative for dry skin and nail changes.   Musculoskeletal: Negative for arthritis, back pain, falls, joint pain, myalgias and neck pain.   Gastrointestinal: Negative for bloating, abdominal pain, change in bowel habit and constipation.   Genitourinary: Negative  for bladder incontinence, dysuria, flank pain, genital sores and missed menses.   Neurological: Negative for aphonia, brief paralysis, difficulty with concentration and dizziness.   Psychiatric/Behavioral: Negative for altered mental status and memory loss. The patient does not have insomnia.    Allergic/Immunologic: Negative for environmental allergies.       Objective:   Physical Exam   Constitutional: He is oriented to person, place, and time. He appears well-developed and well-nourished. He is not intubated.   HENT:   Head: Normocephalic and atraumatic.   Right Ear: External ear normal.   Left Ear: External ear normal.   Mouth/Throat: Oropharynx is clear and moist.   Eyes: Conjunctivae and EOM are normal. Pupils are equal, round, and reactive to light. Right eye exhibits no discharge. Left eye exhibits no discharge. No scleral icterus.   Neck: Normal range of motion. Neck supple. Normal carotid pulses, no hepatojugular reflux and no JVD present. Carotid bruit is not present. No tracheal deviation present. No thyromegaly present.   Cardiovascular: Normal rate, regular rhythm, S1 normal and S2 normal.  No extrasystoles are present. PMI is not displaced. Exam reveals no gallop, no S3, no distant heart sounds, no friction rub and no midsystolic click.   No murmur heard.  Pulses:       Carotid pulses are 2+ on the right side, and 2+ on the left side.       Radial pulses are 2+ on the right side, and 2+ on the left side.        Femoral pulses are 2+ on the right side, and 2+ on the left side.       Popliteal pulses are 2+ on the right side, and 2+ on the left side.        Dorsalis pedis pulses are 1+ on the right side, and 1+ on the left side.        Posterior tibial pulses are 1+ on the right side, and 1+ on the left side.   Pulmonary/Chest: Effort normal and breath sounds normal. No accessory muscle usage or stridor. No apnea, no tachypnea and no bradypnea. He is not intubated. No respiratory distress. He has no  decreased breath sounds. He has no wheezes. He has no rales. He exhibits no tenderness and no bony tenderness.   Abdominal: He exhibits no distension, no pulsatile liver, no abdominal bruit, no ascites, no pulsatile midline mass and no mass. There is no tenderness. There is no rebound and no guarding.   Musculoskeletal: Normal range of motion. He exhibits no edema or tenderness.   Lymphadenopathy:     He has no cervical adenopathy.       2 + L leg and 1+ R leg edema         Neurological: He is alert and oriented to person, place, and time. He has normal reflexes. No cranial nerve deficit. Coordination normal.   Skin: Skin is warm. No rash noted. No erythema. No pallor.     R shin-old scars with healed ulcers    Psychiatric: He has a normal mood and affect. His behavior is normal. Judgment and thought content normal.       Assessment:     1. Venous insufficiency of both lower extremities    2. Mixed hyperlipidemia    3. Essential hypertension    4. 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  Return sooner for concerns or questions. If symptoms persist go to the ED  I have reviewed all pertinent data on this patient     I have reviewed the patient's medical history in detail and updated the computerized patient record.    Orders Placed This Encounter   Procedures    Case Request-Cath Lab: Venogram     Standing Status:    Standing     Number of Occurrences:   1     Order Specific Question:   CPT Code:     Answer:   [place] VENOGRAM EXTREMITY BILAT [23208]       Follow up as scheduled. Return sooner for concerns or questions        He expressed verbal understanding and agreed with the plan      Greater than 50% of the visit of 45 minutes was spent counseling, educating, and coordinating the care of the patient.             Medication List           Accurate as of 9/12/18  3:55 PM. If you have any questions, ask your nurse or doctor.               CONTINUE taking these medications    amLODIPine 5 MG tablet  Commonly known as:  NORVASC  Take 1 tablet (5 mg total) by mouth once daily.     brimonidine 0.1% 0.1 % Drop  Commonly known as:  ALPHAGAN P     * clopidogrel 75 mg tablet  Commonly known as:  PLAVIX  TAKE ONE TABLET BY MOUTH ONCE DAILY     * clopidogrel 75 mg tablet  Commonly known as:  PLAVIX  TAKE ONE TABLET BY MOUTH ONCE DAILY     DESCOVY 200-25 mg Tab  Generic drug:  emtricitabine-tenofovir alafen     efavirenz 600 mg Tab  Commonly known as:  SUSTIVA     EPZICOM 600-300 mg per tablet  Generic drug:  abacavir-lamivudine     EVOTAZ 300-150 mg Tab  Generic drug:  atazanavir-cobicistat     latanoprost 0.005 % ophthalmic solution     olmesartan 40 MG tablet  Commonly known as:  BENICAR     PRALUENT  mg/mL Pnij  Generic drug:  alirocumab  Inject 1 Syringe (150 mg) into the skin every 14 (fourteen) days.     rivaroxaban 20 mg Tab  Commonly known as:  XARELTO  Take 1 tablet (20 mg total) by mouth daily with dinner or evening meal.     VITAMIN D3 ORAL         * This list has 2 medication(s) that are the same as other medications prescribed for you. Read the directions carefully, and ask your doctor or other care provider to review them with you.

## 2018-09-12 NOTE — H&P (VIEW-ONLY)
Subjective:   Patient ID:  Osorio Boggs is a 78 y.o. male who presents for follow up of Coronary Artery Disease; Hyperlipidemia; Hypertension; Venous Insufficiency; and Edema      HPI:       He is here to follow up after R EIV and R CIV + EIV venous intervention for venous insufficiency. He is complaining of edema despite the intervention.         Ultrasound in 2/2017 revealed bilateral LSV reflux. Repeat study 2/2018 only revealed R LSV reflux even though both GSV are dilated-without reflux. EVLT of R LSV was aborted-too small and his symptoms were out of proportion to ultrasound. He will proceed with bilateral deep venogram to assess for iliac vein compression.        He has CAD s/p PCI LAD, LCX, and RCA (2017)-CTS turned down for CABG, HTN, HLP, HIV, PAD-no claudication, and venous insufficiency presenting with complaints of recurrent leg edema, L >> R. R leg ulcers healed with wound care. He wears compression stockings and exercises regularly.       He was experiencing CCS III angina, and Dr. Adan order a stress test, which is abnormal with intermediate risk result.  Underwent cardiac cath in June - 3V CAD.  PCI of LAD and PTCA of RCA performed. Still has LCX disease and RCA disease not stented due to insufficient stent size for RCA.  Returned for PCI of proximal RCA with BMS and DONIS of proximal LAD. Symptoms resolved.           Arterial U/S indicated monophasic wave forms of RCFA and some abnormal waveforms in popliteal system of RLE. Patient denies claudication symptoms.            Venous ultrasound 2/2017     R GSV 4 mm without reflux   R LSV 5 mm with 1168 msec reflux       L GSV 6 mm without reflux   L LSV 5 mm with 860 msec reflux        Venous ultrasound 2/2018      R GSV 8 mm without reflux   R LSV 4 mm with 1620 msec reflux       L GSV 6 mm without reflux   L LSV 3 mm without reflux       Venous ultrasound 6/2018     No superficial reflux   + deep venous reflux   No DVT       US  7/27/2018    RIGHT:  No evidence of Right lower extremity DVT.    There is reflux > 500 msec in the Common Femoral, Femoral, Posterior Tibial, Peroneal, and External Iliac Veins.      LEFT:  No evidence of Left lower extremity DVT.    There is reflux > 500 msec in the Common Femoral, Femoral, Popliteal, Posterior Tibial, Peroneal, External Iliac, and Saphenofemoral Junction Veins.    GSV ak measures 0.29 cm.    GSV at SFJ measures 1.1 cm.    GSV prox measures 0.38 cm.      On the right there is an accessory GSV with reflux > 500 ms  Also noted reflux 837 ms in a . There are varicosities 0.73 in diameter    There is a left accessory without significant reflux.  There is a  with reflux of 651 ms and varicosity of 0.43.              Patient Active Problem List    Diagnosis Date Noted    Venous insufficiency of lower extremity 05/10/2018     Priority: Low    Venous insufficiency of both lower extremities 04/25/2018     Priority: Low         5/11/2018:        S/p venogram + IV guided intervention for refractory bilateral edema.    Right EIV 55.6% stenosis + CSA 69 mm2 (Normal  mm2).    Right EIV PTV with 20 x 70 mm Wallstent with post PTV  mm2.    Left CIV 75.1% stenosis + CSA 65.9 mm2 (Normal  mm2).    Left CIV + EIV PTV with 22 x 70 + 16 x 60 mm Wallstents.    Post stenotic CIV  mm2 and EIV  mm2.      Statin intolerance 10/03/2017    Coronary artery disease involving native coronary artery of native heart without angina pectoris 07/06/2017    Azotemia 03/20/2017    Chronic venous insufficiency 03/14/2017       Venous ultrasound 2/2017     R GSV 4 mm without reflux   R LSV 5 mm with 1168 msec reflux       L GSV 6 mm without reflux   L LSV 5 mm with 860 msec reflux      Deep venous intervention 5/11/2018      S/p venogram         R EIV 55.6% stenosis               CSA 69 mm2              Normal 156 mm2           R EIV PTV with 20 x 70 mm Wallstent  Post   mm2           L CIV 75.1% stenosis              CSA 65.9 mm2              Normal 235 mm2           L CIV + EIV PTV with 22 x 70 + 16 x 60 mm Wallstents  Post stenotic  mm2             HIV positive 2017    PAD (peripheral artery disease) 2017    Hypertension 2017    Hyperlipidemia 2017    Ulcer of right lower extremity with fat layer exposed 2017    Venous stasis 2017    Glaucoma 2015           Right Arm BP - Sittin/67  Left Arm BP - Sittin/66        LABS    Lipid panel  Lab Results   Component Value Date    CHOL 224 (H) 2018    CHOL 238 (H) 10/17/2017     Lab Results   Component Value Date    HDL 60 2018    HDL 67 10/17/2017     Lab Results   Component Value Date    LDLCALC 144.2 2018    LDLCALC 160.2 (H) 10/17/2017     Lab Results   Component Value Date    TRIG 99 2018    TRIG 54 10/17/2017     Lab Results   Component Value Date    CHOLHDL 26.8 2018    CHOLHDL 28.2 10/17/2017            Review of Systems   Constitution: Negative for diaphoresis, weakness, night sweats, weight gain and weight loss.   HENT: Negative for congestion.    Eyes: Negative for blurred vision, discharge and double vision.   Cardiovascular: Positive for leg swelling. Negative for chest pain, claudication, cyanosis, dyspnea on exertion, irregular heartbeat, near-syncope, orthopnea, palpitations, paroxysmal nocturnal dyspnea and syncope.   Respiratory: Negative for cough, shortness of breath and wheezing.    Endocrine: Negative for cold intolerance, heat intolerance and polyphagia.   Hematologic/Lymphatic: Negative for adenopathy and bleeding problem. Does not bruise/bleed easily.   Skin: Negative for dry skin and nail changes.   Musculoskeletal: Negative for arthritis, back pain, falls, joint pain, myalgias and neck pain.   Gastrointestinal: Negative for bloating, abdominal pain, change in bowel habit and constipation.   Genitourinary: Negative  for bladder incontinence, dysuria, flank pain, genital sores and missed menses.   Neurological: Negative for aphonia, brief paralysis, difficulty with concentration and dizziness.   Psychiatric/Behavioral: Negative for altered mental status and memory loss. The patient does not have insomnia.    Allergic/Immunologic: Negative for environmental allergies.       Objective:   Physical Exam   Constitutional: He is oriented to person, place, and time. He appears well-developed and well-nourished. He is not intubated.   HENT:   Head: Normocephalic and atraumatic.   Right Ear: External ear normal.   Left Ear: External ear normal.   Mouth/Throat: Oropharynx is clear and moist.   Eyes: Conjunctivae and EOM are normal. Pupils are equal, round, and reactive to light. Right eye exhibits no discharge. Left eye exhibits no discharge. No scleral icterus.   Neck: Normal range of motion. Neck supple. Normal carotid pulses, no hepatojugular reflux and no JVD present. Carotid bruit is not present. No tracheal deviation present. No thyromegaly present.   Cardiovascular: Normal rate, regular rhythm, S1 normal and S2 normal.  No extrasystoles are present. PMI is not displaced. Exam reveals no gallop, no S3, no distant heart sounds, no friction rub and no midsystolic click.   No murmur heard.  Pulses:       Carotid pulses are 2+ on the right side, and 2+ on the left side.       Radial pulses are 2+ on the right side, and 2+ on the left side.        Femoral pulses are 2+ on the right side, and 2+ on the left side.       Popliteal pulses are 2+ on the right side, and 2+ on the left side.        Dorsalis pedis pulses are 1+ on the right side, and 1+ on the left side.        Posterior tibial pulses are 1+ on the right side, and 1+ on the left side.   Pulmonary/Chest: Effort normal and breath sounds normal. No accessory muscle usage or stridor. No apnea, no tachypnea and no bradypnea. He is not intubated. No respiratory distress. He has no  decreased breath sounds. He has no wheezes. He has no rales. He exhibits no tenderness and no bony tenderness.   Abdominal: He exhibits no distension, no pulsatile liver, no abdominal bruit, no ascites, no pulsatile midline mass and no mass. There is no tenderness. There is no rebound and no guarding.   Musculoskeletal: Normal range of motion. He exhibits no edema or tenderness.   Lymphadenopathy:     He has no cervical adenopathy.       2 + L leg and 1+ R leg edema         Neurological: He is alert and oriented to person, place, and time. He has normal reflexes. No cranial nerve deficit. Coordination normal.   Skin: Skin is warm. No rash noted. No erythema. No pallor.     R shin-old scars with healed ulcers    Psychiatric: He has a normal mood and affect. His behavior is normal. Judgment and thought content normal.       Assessment:     1. Venous insufficiency of both lower extremities    2. Mixed hyperlipidemia    3. Essential hypertension    4. 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  Return sooner for concerns or questions. If symptoms persist go to the ED  I have reviewed all pertinent data on this patient     I have reviewed the patient's medical history in detail and updated the computerized patient record.    Orders Placed This Encounter   Procedures    Case Request-Cath Lab: Venogram     Standing Status:    Standing     Number of Occurrences:   1     Order Specific Question:   CPT Code:     Answer:   [place] VENOGRAM EXTREMITY BILAT [57062]       Follow up as scheduled. Return sooner for concerns or questions        He expressed verbal understanding and agreed with the plan      Greater than 50% of the visit of 45 minutes was spent counseling, educating, and coordinating the care of the patient.             Medication List           Accurate as of 9/12/18  3:55 PM. If you have any questions, ask your nurse or doctor.               CONTINUE taking these medications    amLODIPine 5 MG tablet  Commonly known as:  NORVASC  Take 1 tablet (5 mg total) by mouth once daily.     brimonidine 0.1% 0.1 % Drop  Commonly known as:  ALPHAGAN P     * clopidogrel 75 mg tablet  Commonly known as:  PLAVIX  TAKE ONE TABLET BY MOUTH ONCE DAILY     * clopidogrel 75 mg tablet  Commonly known as:  PLAVIX  TAKE ONE TABLET BY MOUTH ONCE DAILY     DESCOVY 200-25 mg Tab  Generic drug:  emtricitabine-tenofovir alafen     efavirenz 600 mg Tab  Commonly known as:  SUSTIVA     EPZICOM 600-300 mg per tablet  Generic drug:  abacavir-lamivudine     EVOTAZ 300-150 mg Tab  Generic drug:  atazanavir-cobicistat     latanoprost 0.005 % ophthalmic solution     olmesartan 40 MG tablet  Commonly known as:  BENICAR     PRALUENT  mg/mL Pnij  Generic drug:  alirocumab  Inject 1 Syringe (150 mg) into the skin every 14 (fourteen) days.     rivaroxaban 20 mg Tab  Commonly known as:  XARELTO  Take 1 tablet (20 mg total) by mouth daily with dinner or evening meal.     VITAMIN D3 ORAL         * This list has 2 medication(s) that are the same as other medications prescribed for you. Read the directions carefully, and ask your doctor or other care provider to review them with you.

## 2018-09-12 NOTE — PATIENT INSTRUCTIONS
Understanding Chronic Venous Insufficiency  Problems with the veins in the legs may lead to chronic venous insufficiency (CVI). CVI means that there is a long-term problem with the veins not being able to pump blood back to your heart. When this happens, blood stays in the legs and causes swelling and aching.   Two problems that may lead to chronic venous insufficiency are:  · Damaged valves. Valves keep blood flowing from the legs through the blood vessels and back to the heart. When the valves are damaged, blood does not flow as well.   · Deep vein thrombosis (DVT). Blood clots may form in the deep veins of the legs. This may cause pain, redness, and swelling in the legs. It may also block the flow of blood back to the heart. Seek immediate medical care if you have these symptoms.  · A blood clot in the leg can also break off and travel to the lungs. This is called pulmonary embolism (PE). In the lungs, the clot can cut off the flow of blood. This may cause chest pain, trouble breathing, sweating, a fast heartbeat, coughing (may cough up blood), and fainting. It is a medical emergency and may cause death. Call 911 if you have these symptoms.  · Healthcare providers call the two conditions, DVT and PE, venous thromboembolism (VTE).  CVI cant be cured, but you can control leg swelling to reduce the likelihood of ulcers (sores).  Recognizing the symptoms  Be aware of the following:  · If you stand or sit with your feet down for long periods, your legs may ache or feel heavy.  · Swollen ankles are possibly the most common symptom of CVI.  · As swelling increases, the skin over your ankles may show red spots or a brownish tinge. The skin may feel leathery or scaly, and may start to itch.  · If swelling is not controlled, an ulcer (open wound) may form.  What you can do  Reduce your risk of developing ulcers by doing the following:  · Increase blood flow back to your heart by elevating your legs, exercising daily,  and wearing elastic stockings.  · Boost blood flow in your legs by losing excess weight.  · If you must stand or sit in one place for a period of time, keep your blood moving by wiggling your toes, shifting your body position, and rising up on the balls of your feet.    Date Last Reviewed: 5/1/2016  © 0488-8453 [employer]. [address] [place] [address], [place] [address]. All rights reserved. This information is not intended as a substitute for professional medical care. Always follow your healthcare professional's instructions.

## 2018-09-19 NOTE — NURSING
Called pt for pre op instructions. Pt requested we will call him back on tomorrow 9/20 for pre op instructions. Will call pt back on 9/20.

## 2018-09-24 NOTE — NURSING
Called pt. Pt verbalized understanding of all pre op instructions and repeated back and states writing down:  arrival time 06:00 Friday 9/28.  Last dose of zarelto on Tuesday 9/25, npo after midnight except take meds morning of procedure with a small sip of water.   Informed pt his has to arrange for transportation home. He states he will ask his daughter.

## 2018-09-28 ENCOUNTER — HOSPITAL ENCOUNTER (OUTPATIENT)
Facility: HOSPITAL | Age: 78
Discharge: HOME | End: 2018-09-28
Attending: INTERNAL MEDICINE | Admitting: INTERNAL MEDICINE
Payer: MEDICARE

## 2018-09-28 VITALS
TEMPERATURE: 98 F | HEART RATE: 67 BPM | DIASTOLIC BLOOD PRESSURE: 84 MMHG | OXYGEN SATURATION: 100 % | BODY MASS INDEX: 27.63 KG/M2 | HEIGHT: 72 IN | SYSTOLIC BLOOD PRESSURE: 175 MMHG | RESPIRATION RATE: 19 BRPM | WEIGHT: 204 LBS

## 2018-09-28 DIAGNOSIS — I87.2 VENOUS INSUFFICIENCY OF BOTH LOWER EXTREMITIES: ICD-10-CM

## 2018-09-28 DIAGNOSIS — H40.9 GLAUCOMA: ICD-10-CM

## 2018-09-28 LAB
ANION GAP SERPL CALC-SCNC: 13 MMOL/L
ANION GAP SERPL CALC-SCNC: 8 MMOL/L
BUN SERPL-MCNC: 26 MG/DL
BUN SERPL-MCNC: 32 MG/DL
CALCIUM SERPL-MCNC: 8.6 MG/DL
CALCIUM SERPL-MCNC: 8.9 MG/DL
CHLORIDE SERPL-SCNC: 109 MMOL/L
CHLORIDE SERPL-SCNC: 112 MMOL/L
CO2 SERPL-SCNC: 13 MMOL/L
CO2 SERPL-SCNC: 18 MMOL/L
CREAT SERPL-MCNC: 1.4 MG/DL
CREAT SERPL-MCNC: 1.6 MG/DL
ERYTHROCYTE [DISTWIDTH] IN BLOOD BY AUTOMATED COUNT: 13.2 %
EST. GFR  (AFRICAN AMERICAN): 46 ML/MIN/1.73 M^2
EST. GFR  (AFRICAN AMERICAN): 54 ML/MIN/1.73 M^2
EST. GFR  (NON AFRICAN AMERICAN): 40 ML/MIN/1.73 M^2
EST. GFR  (NON AFRICAN AMERICAN): 47 ML/MIN/1.73 M^2
GLUCOSE SERPL-MCNC: 103 MG/DL
GLUCOSE SERPL-MCNC: 142 MG/DL
HCT VFR BLD AUTO: 35.6 %
HGB BLD-MCNC: 11.9 G/DL
MCH RBC QN AUTO: 32.2 PG
MCHC RBC AUTO-ENTMCNC: 33.4 G/DL
MCV RBC AUTO: 97 FL
PLATELET # BLD AUTO: 216 K/UL
PMV BLD AUTO: 10.4 FL
POC ACTIVATED CLOTTING TIME K: 175 SEC (ref 74–137)
POC ACTIVATED CLOTTING TIME K: 197 SEC (ref 74–137)
POTASSIUM SERPL-SCNC: 4.4 MMOL/L
POTASSIUM SERPL-SCNC: 4.5 MMOL/L
RBC # BLD AUTO: 3.69 M/UL
SAMPLE: ABNORMAL
SAMPLE: ABNORMAL
SODIUM SERPL-SCNC: 135 MMOL/L
SODIUM SERPL-SCNC: 138 MMOL/L
WBC # BLD AUTO: 11.14 K/UL

## 2018-09-28 PROCEDURE — 85027 COMPLETE CBC AUTOMATED: CPT

## 2018-09-28 PROCEDURE — 93005 ELECTROCARDIOGRAM TRACING: CPT

## 2018-09-28 PROCEDURE — 25500020 PHARM REV CODE 255

## 2018-09-28 PROCEDURE — 37239 OPEN/PERQ PLACE STENT EA ADD: CPT | Mod: LT,,, | Performed by: INTERNAL MEDICINE

## 2018-09-28 PROCEDURE — C1759 CATH, INTRA ECHOCARDIOGRAPHY: HCPCS

## 2018-09-28 PROCEDURE — 36415 COLL VENOUS BLD VENIPUNCTURE: CPT

## 2018-09-28 PROCEDURE — 27000014 CATH LAB PROCEDURE

## 2018-09-28 PROCEDURE — 93010 ELECTROCARDIOGRAM REPORT: CPT | Mod: ,,, | Performed by: STUDENT IN AN ORGANIZED HEALTH CARE EDUCATION/TRAINING PROGRAM

## 2018-09-28 PROCEDURE — 63600175 PHARM REV CODE 636 W HCPCS

## 2018-09-28 PROCEDURE — 37238 OPEN/PERQ PLACE STENT SAME: CPT | Mod: RT,,, | Performed by: INTERNAL MEDICINE

## 2018-09-28 PROCEDURE — 36011 PLACE CATHETER IN VEIN: CPT | Mod: 51,,, | Performed by: INTERNAL MEDICINE

## 2018-09-28 PROCEDURE — 25000003 PHARM REV CODE 250: Performed by: INTERNAL MEDICINE

## 2018-09-28 PROCEDURE — 99152 MOD SED SAME PHYS/QHP 5/>YRS: CPT | Mod: ,,, | Performed by: INTERNAL MEDICINE

## 2018-09-28 PROCEDURE — 80048 BASIC METABOLIC PNL TOTAL CA: CPT | Mod: 91

## 2018-09-28 PROCEDURE — 37252 INTRVASC US NONCORONARY 1ST: CPT | Mod: ,,, | Performed by: INTERNAL MEDICINE

## 2018-09-28 PROCEDURE — 37253 INTRVASC US NONCORONARY ADDL: CPT | Mod: ,,, | Performed by: INTERNAL MEDICINE

## 2018-09-28 RX ORDER — DIPHENHYDRAMINE HCL 25 MG
50 CAPSULE ORAL ONCE
Status: COMPLETED | OUTPATIENT
Start: 2018-09-28 | End: 2018-09-28

## 2018-09-28 RX ORDER — ACETAMINOPHEN 325 MG/1
650 TABLET ORAL EVERY 4 HOURS PRN
Status: DISCONTINUED | OUTPATIENT
Start: 2018-09-28 | End: 2018-09-28 | Stop reason: HOSPADM

## 2018-09-28 RX ORDER — DARUNAVIR, COBICISTAT, EMTRICITABINE, AND TENOFOVIR ALAFENAMIDE 800; 150; 200; 10 MG/1; MG/1; MG/1; MG/1
1 TABLET, FILM COATED ORAL
COMMUNITY
End: 2019-11-14

## 2018-09-28 RX ORDER — ONDANSETRON 2 MG/ML
4 INJECTION INTRAMUSCULAR; INTRAVENOUS EVERY 12 HOURS PRN
Status: DISCONTINUED | OUTPATIENT
Start: 2018-09-28 | End: 2018-09-28 | Stop reason: HOSPADM

## 2018-09-28 RX ORDER — SODIUM CHLORIDE 9 MG/ML
INJECTION, SOLUTION INTRAVENOUS CONTINUOUS
Status: DISCONTINUED | OUTPATIENT
Start: 2018-09-28 | End: 2018-09-28 | Stop reason: HOSPADM

## 2018-09-28 RX ORDER — HYDROCODONE BITARTRATE AND ACETAMINOPHEN 5; 325 MG/1; MG/1
1 TABLET ORAL EVERY 4 HOURS PRN
Status: DISCONTINUED | OUTPATIENT
Start: 2018-09-28 | End: 2018-09-28 | Stop reason: HOSPADM

## 2018-09-28 RX ADMIN — DIPHENHYDRAMINE HYDROCHLORIDE 50 MG: 25 CAPSULE ORAL at 08:09

## 2018-09-28 RX ADMIN — SODIUM CHLORIDE: 0.9 INJECTION, SOLUTION INTRAVENOUS at 07:09

## 2018-09-28 RX ADMIN — SODIUM CHLORIDE 3 ML/KG/HR: 0.9 INJECTION, SOLUTION INTRAVENOUS at 10:09

## 2018-09-28 NOTE — NURSING
Patient discharged to home as ordered. All discharge instructions and  printed materials  given to patient. Bilateral groin sites CDI, no hematoma or bleeding noted.  PIV d/c.  Tip in tact.  VSS.  No c/o pain. Patient instructed on follow-up appointment as ordered. Patient verbalized understanding of and agreement with all discharge instructions given. Pt d/c home via wheelchair to private vehicle.

## 2018-09-28 NOTE — INTERVAL H&P NOTE
The patient has been examined and the H&P has been reviewed:        Anesthesia/Surgery risks, benefits and alternative options discussed and understood by patient/family.          Active Hospital Problems    Diagnosis  POA    *Venous insufficiency of both lower extremities [I87.2]  Yes     Priority: Low         5/11/2018:        S/p venogram + IV guided intervention for refractory bilateral edema.    Right EIV 55.6% stenosis + CSA 69 mm2 (Normal  mm2).    Right EIV PTV with 20 x 70 mm Wallstent with post PTV  mm2.    Left CIV 75.1% stenosis + CSA 65.9 mm2 (Normal  mm2).    Left CIV + EIV PTV with 22 x 70 + 16 x 60 mm Wallstents.    Post stenotic CIV  mm2 and EIV  mm2.      Venous insufficiency of lower extremity [I87.2]  Yes     Priority: Low    Coronary artery disease involving native coronary artery of native heart without angina pectoris [I25.10]  Yes    Chronic venous insufficiency [I87.2]  Yes     Chronic       Venous ultrasound 2/2017     R GSV 4 mm without reflux   R LSV 5 mm with 1168 msec reflux       L GSV 6 mm without reflux   L LSV 5 mm with 860 msec reflux      Deep venous intervention 5/11/2018      S/p venogram         R EIV 55.6% stenosis               CSA 69 mm2              Normal 156 mm2           R EIV PTV with 20 x 70 mm Wallstent  Post  mm2           L CIV 75.1% stenosis              CSA 65.9 mm2              Normal 235 mm2           L CIV + EIV PTV with 22 x 70 + 16 x 60 mm Wallstents  Post stenotic  mm2             HIV positive [Z21]  Yes    Hypertension [I10]  Yes    Hyperlipidemia [E78.5]  Yes      Resolved Hospital Problems   No resolved problems to display.

## 2018-10-02 LAB — PERIPHERAL STENOSIS: ABNORMAL

## 2018-10-04 NOTE — DISCHARGE SUMMARY
[facility]  Cardiology  Discharge Summary      Patient Name: Osorio Boggs  MRN: [id number]  Admission Date: 9/28/2018  Hospital Length of Stay: 0 days  Discharge Date and Time: 9/28/2018  Attending Physician: Carmen att. providers found  Discharging Provider: Manuel Metzger MD  Primary Care Physician: Keaton Adan MD    HPI:       Narrative        Date of Procedure:  09/28/2018    A. Indication/Pre-Operative Diagnosis     The patient is a 78 year old male that was referred for catheterization by Keaton Adan for venous insufficiency.     B. Summary/Post-Operative Diagnosis      1.   S/p bilateral iliac venogram with IVUS guidance.   2.   60% ostial L CIV compression.   3.   Bifurcating kissing stents CIV.   4.   20 x 80 wallstent right CIV.   5.   22 x 70 Wallstent left CIV.   6.   Post dilated both with 18 x 40 mm balloons.    C. HPI     I have reviewed the history and physical completed on 09/28/2018. The patient has been examined and the following changes have been noted:    He is here to follow up after R EIV and R CIV + EIV venous intervention for venous insufficiency. He is complaining of edema despite the intervention.            Ultrasound in 2/2017 revealed bilateral LSV reflux. Repeat study 2/2018 only revealed R LSV reflux even though both GSV are dilated-without reflux. EVLT of R LSV was aborted-too small and his symptoms were out of proportion to ultrasound. He will proceed   with bilateral deep venogram to assess for iliac vein compression.           He has CAD s/p PCI LAD, LCX, and RCA (2017)-CTS turned down for CABG, HTN, HLP, HIV, PAD-no claudication, and venous insufficiency presenting with complaints of recurrent leg edema, L >> R. R leg ulcers healed with wound care. He wears compression   stockings and exercises regularly.         He was experiencing CCS III angina, and Dr. Adan order a stress test, which is abnormal with intermediate risk result.  Underwent cardiac cath in  June - 3V CAD.  PCI of LAD and PTCA of RCA performed. Still has LCX disease and RCA disease not stented   due to insufficient stent size for RCA.  Returned for PCI of proximal RCA with BMS and DONIS of proximal LAD. Symptoms resolved.            Arterial U/S indicated monophasic wave forms of RCFA and some abnormal waveforms in popliteal system of RLE. Patient denies claudication symptoms.              Venous ultrasound 2/2017                 R GSV 4 mm without reflux              R LSV 5 mm with 1168 msec reflux                    L GSV 6 mm without reflux              L LSV 5 mm with 860 msec reflux           Venous ultrasound 2/2018                            R GSV 8 mm without reflux              R LSV 4 mm with 1620 msec reflux                    L GSV 6 mm without reflux              L LSV 3 mm without reflux         Venous ultrasound 6/2018                 No superficial reflux              + deep venous reflux              No DVT         US 7/27/2018: bilateral deep venous reflux + trivial accessory and perforators reflux          Patient history was obtained from the patient.     Counseling: The patient was counseled regarding the potential risks and benefits of this procedure, as well as possible alternative approaches to the problem and gave informed consent.    The ASA Score was Class II.     [facility] Risk Score for MACE is 4.40%. [facility] Risk Score for Death is 0.90%.     The patient had a previous angiogram at an outside facility. The films were available for review.     Height: 72 in.      Weight: 204 lbs.      BMI: 27.70 kg/m2    OUTPATIENT MEDICATIONS: Medications were reviewed.  ALLERGIES: Allergies were reviewed.    Laboratory data revealed:     09/28/2018 WBCIR  11.14, PLT  216, HCT  35.6, HGB  11.9   09/28/2018 K  4.5, NA  138, GLU  142, CREAT  1.4, BUN  26            Manual Labs:  ACT Reference Range:  sec, ACT-PLUS cartridge Cardiopulmonary Bypass: 410-440 sec, ACT-LR cartridge  Indwelling Vascular sheath Removal:  sec  09/28/2018 09:30        D. Hemodynamic Results       AIR REST (9/28/2018 08:33:29):  BP: 128/73  HR: 65    E. Angiographic Results     Diagnostic:        - Left Common Iliac Vein:             IVUS showed the ostial Left Common Iliac Vein has a 60% stenosis.     - Right Common Iliac Vein:             IVUS showed the Right Common Iliac Vein.     - Common Femoral Vein:             The bilateral CFVs are normal.      Intervention          Ostial Left Common Iliac Vein:              The following items were used: Stent Wallstent 22 X 70 X75 and Blln [place] 18 X 40 X 75.       Right Common Iliac Vein:              The following items were used: Wallstent 20 X 80 X 75 and Blln [place] 18 X 40 X 75.    F. Details of Procedure     PROCEDURES PERFORMED: IVUS, Femoral Stent - Venous, Femoral PTA - Venous and Venogram - Cath Lab Procedure    ANESTHESIA: Conscious sedation was achieved with 150 mcg of FENTANYL 100MCG/2ML and 2 mg of Versed. Local anesthesia was achieved with 10 ml of Lidocaine 1%. Moderate conscious sedation was performed and cardiorespiratory functions were monitored the   entire procedure by Yeimi Min RN. Sedation began at 08:50 AM and concluded at 10:01 AM, totalling 71.2 minutes.     PRIMARY SURGEON: Manuel Metzger MD    COMPLICATIONS: There were no complications.    Medications given on sterile field: Lidocaine 1% (10 ml).    Medications given during procedure: Heparin 1000u/500cc Bag (3 bags), Fentanyl 100mcg/2ml (150 mcg), Versed (2 mg), Heparin 1000u/ml Inj (6000 units) and Benadryl 50mg To (25 mg).    The patient was brought to the catheterization laboratory after premedication with oral Benadryl 50 mg. Bilateral groin prepped and draped. The Kit, Manifold was flushed and connected to contrast. After local anesthesia, a Micropuncture W/ 4cm Needle was   inserted into the right Femoral vein. The Micropuncture W/ 4cm Needle was removed. A Sheath  6fr X 11cm was inserted into the right Femoral vein. A Micropuncture W/ 4cm Needle was inserted into the left Femoral vein. A Wire J .035 X 150 was inserted into   the right Femoral vein. The Sheath 6fr X 11cm was removed. A Sheath Brite Tip 8fr X 35cm was inserted into the right Femoral vein. The Micropuncture W/ 4cm Needle was removed. A Sheath 6fr X 11cm was inserted into the left Femoral vein. A Wire J .035 X   150 was inserted into the left Femoral vein. The Sheath 6fr X 11cm was removed. A Sheath Brite Tip 8fr X 35cm was inserted into the left Femoral vein. The Wire J .035 X 150 was removed. A Wire Amplatz Ss .035 X 180cm was inserted into the right Femoral   vein.     Right  COMMON ILIAC VEIN    The [employer] Peripheral 035 was inserted into the Right Common Iliac Vein. Ultrasound performed. The [employer] Peripheral 035 was removed.     Left  EXTERNAL ILIAC VEIN    The [employer] Peripheral 035 was inserted into the Left External Iliac Vein and ultrasound performed. The [employer] Peripheral 035 was removed. The Wire J .035 X 150 was removed.     Left  FEMORAL VEIN    A Wire Amplatz Ss .035 X 180cm was inserted into the proximal Left Femoral Vein. The Sheath Brite Tip 8fr X 35cm was removed.     Right  COMMON FEMORAL VEIN    A Sheath Brite Tip 10fr X 11cm was inserted into the Right Common Femoral Vein. The Sheath Brite Tip 8fr X 35cm was removed.     Left  FEMORAL VEIN    A Sheath Brite Tip 10fr X 11cm was inserted into the proximal Left Femoral Vein.     Right  COMMON ILIAC VEIN    A Wallstent 20 X 80 X 75 was inserted into the Right Common Iliac Vein.     Left  COMMON ILIAC VEIN    A Stent Wallstent 22 X 70 X75 was inserted into the Left Common Iliac Vein. Delivery system removed. Delivery system removed.     Right  COMMON ILIAC VEIN    A Blln [place] 18 X 40 X 75 was inserted into the Right Common Iliac Vein. A Blln [place] 18 X 40 X 75 was inserted into the Left Common Iliac Vein  and was inflated with indeflator at 2.0 kristi. for 10.0 secs. and was inflated with indeflator at 3.0   kristi. for 15.0 secs. and was inflated with indeflator at 4.0 kristi. for 15.0 secs. The Blln [place] 18 X 40 X 75 was removed. The Blln [place] 18 X 40 X 75 was removed. Angiography performed in the proximal Left Femoral Vein. The Wire Amplatz Ss .035 X 180cm   was removed. The Wire Amplatz Ss .035 X 180cm was removed. The Sheath Brite Tip 10fr X 11cm was sutured in. Total Visipaque injected was 60.0 ml. Total Visipaque used was 150.0 ml.       Fluoroscopy Time 10.5 minutes   Radiation Dose 231.6 mGy   Contrast Injected 60 ml Visipaque   Contrast Used  150 ml Visipaque            Procedure log documented by JANIA Rodriguez and verified by Manuel Metzger MD    ESTIMATED BLOOD LOSS is < 50 cc.    SPECIMEN: No specimen.     G. Recommendations     1. Routine post-cath care.  2. ASA 81mg.  3. Resume all activities and compression stockings    I certify that I was present for catheter insertion, catheter manipulation, angiography, angiographic interpretation, and all interventional procedures performed on this patient.          Procedure(s) (LRB):  Venogram (N/A)     Indwelling Lines/Drains at time of discharge:  Lines/Drains/Airways          None          Hospital Course  See HPI    Consults: none     Significant Diagnostic Studies:  Labs, ecg, and venogram     Pending Diagnostic Studies:     None          Final Active Diagnoses:    Diagnosis Date Noted POA    PRINCIPAL PROBLEM:  Venous insufficiency of both lower extremities [I87.2] 04/25/2018 Yes    Venous insufficiency of lower extremity [I87.2] 05/10/2018 Yes    Coronary artery disease involving native coronary artery of native heart without angina pectoris [I25.10] 07/06/2017 Yes    Chronic venous insufficiency [I87.2] 03/14/2017 Yes     Chronic    HIV positive [Z21] 02/09/2017 Yes    Hypertension [I10] 02/08/2017 Yes    Hyperlipidemia [E78.5] 02/08/2017 Yes       Problems Resolved During this Admission:       Discharged Condition:  Stable    Follow Up:      DC home      Follow up with PCP      Follow up with Dr. Metzger or primary cardiologist as scheduled      Cardiac diet      Resume normal activities in 3 days      Patient Instructions:        As above        Medications:         Medication List      ASK your doctor about these medications    amLODIPine 5 MG tablet  Commonly known as:  NORVASC  Take 1 tablet (5 mg total) by mouth once daily.     brimonidine 0.1% 0.1 % Drop  Commonly known as:  ALPHAGAN P     * clopidogrel 75 mg tablet  Commonly known as:  PLAVIX  TAKE ONE TABLET BY MOUTH ONCE DAILY     * clopidogrel 75 mg tablet  Commonly known as:  PLAVIX  TAKE ONE TABLET BY MOUTH ONCE DAILY     DESCOVY 200-25 mg Tab  Generic drug:  emtricitabine-tenofovir alafen     efavirenz 600 mg Tab  Commonly known as:  SUSTIVA     EPZICOM 600-300 mg per tablet  Generic drug:  abacavir-lamivudine     EVOTAZ 300-150 mg Tab  Generic drug:  atazanavir-cobicistat     latanoprost 0.005 % ophthalmic solution     olmesartan 40 MG tablet  Commonly known as:  BENICAR     PRALUENT  mg/mL Pnij  Generic drug:  alirocumab  Inject 1 Syringe (150 mg) into the skin every 14 (fourteen) days.     rivaroxaban 20 mg Tab  Commonly known as:  XARELTO  Take 1 tablet (20 mg total) by mouth daily with dinner or evening meal.     SYMTUZA [id number] mg Tab  Generic drug:  darunavir-gustavo-emtri-tenof ala     VITAMIN D3 ORAL         * This list has 2 medication(s) that are the same as other medications prescribed for you. Read the directions carefully, and ask your doctor or other care provider to review them with you.                   Time spent on the discharge of patient: 45 minutes    Manuel Metzger MD  Cardiology  [facility]

## 2018-10-15 ENCOUNTER — OFFICE VISIT (OUTPATIENT)
Dept: FAMILY MEDICINE | Facility: CLINIC | Age: 78
End: 2018-10-15
Payer: MEDICARE

## 2018-10-15 ENCOUNTER — PATIENT MESSAGE (OUTPATIENT)
Dept: ADMINISTRATIVE | Facility: OTHER | Age: 78
End: 2018-10-15

## 2018-10-15 VITALS
OXYGEN SATURATION: 99 % | WEIGHT: 202.06 LBS | SYSTOLIC BLOOD PRESSURE: 138 MMHG | BODY MASS INDEX: 27.37 KG/M2 | TEMPERATURE: 98 F | DIASTOLIC BLOOD PRESSURE: 72 MMHG | HEART RATE: 77 BPM | HEIGHT: 72 IN

## 2018-10-15 DIAGNOSIS — I25.10 CORONARY ARTERY DISEASE INVOLVING NATIVE CORONARY ARTERY OF NATIVE HEART WITHOUT ANGINA PECTORIS: ICD-10-CM

## 2018-10-15 DIAGNOSIS — I87.2 VENOUS INSUFFICIENCY OF LOWER EXTREMITY, UNSPECIFIED LATERALITY: ICD-10-CM

## 2018-10-15 DIAGNOSIS — I10 ESSENTIAL HYPERTENSION: Primary | ICD-10-CM

## 2018-10-15 DIAGNOSIS — E78.2 MIXED HYPERLIPIDEMIA: ICD-10-CM

## 2018-10-15 PROBLEM — L97.912 ULCER OF RIGHT LOWER EXTREMITY WITH FAT LAYER EXPOSED: Status: RESOLVED | Noted: 2017-02-08 | Resolved: 2018-10-15

## 2018-10-15 PROCEDURE — 99213 OFFICE O/P EST LOW 20 MIN: CPT | Mod: S$GLB,,, | Performed by: FAMILY MEDICINE

## 2018-10-15 PROCEDURE — 3078F DIAST BP <80 MM HG: CPT | Mod: CPTII,S$GLB,, | Performed by: FAMILY MEDICINE

## 2018-10-15 PROCEDURE — 1101F PT FALLS ASSESS-DOCD LE1/YR: CPT | Mod: CPTII,S$GLB,, | Performed by: FAMILY MEDICINE

## 2018-10-15 PROCEDURE — 3075F SYST BP GE 130 - 139MM HG: CPT | Mod: CPTII,S$GLB,, | Performed by: FAMILY MEDICINE

## 2018-10-15 RX ORDER — ERGOCALCIFEROL 1.25 MG/1
CAPSULE ORAL
Refills: 3 | COMMUNITY
Start: 2018-08-13 | End: 2019-01-09 | Stop reason: SDUPTHER

## 2018-10-15 RX ORDER — MUPIROCIN 20 MG/G
OINTMENT TOPICAL DAILY
Qty: 30 G | Refills: 1 | Status: SHIPPED | OUTPATIENT
Start: 2018-10-15 | End: 2018-10-25

## 2018-10-15 NOTE — PROGRESS NOTES
Subjective:      Patient ID: Osorio Boggs is a 78 y.o. male.    Chief Complaint: Follow-up      HPI   bp check; wants a new machine; trying to get pt signed bup with digital medicine; he has a smart phone and is on the portal already  Last visit in march; no more headaches; occ vertigo, better.  Dr dominguez's note read from end of September  Review of Systems   Constitutional: Negative for appetite change, fatigue, fever and unexpected weight change.   HENT: Negative for congestion, ear pain, sinus pressure and sore throat.    Eyes: Negative for pain and visual disturbance.   Respiratory: Negative for shortness of breath.    Cardiovascular: Positive for leg swelling. Negative for chest pain.        Left leg   Gastrointestinal: Negative for abdominal pain, constipation and diarrhea.   Endocrine: Negative for polyuria.   Genitourinary: Negative for difficulty urinating and frequency.   Musculoskeletal: Negative for arthralgias, back pain and myalgias.   Skin: Negative for color change.   Allergic/Immunologic: Negative.    Neurological: Negative for syncope, weakness and headaches.   Hematological: Does not bruise/bleed easily.   Psychiatric/Behavioral: Negative for dysphoric mood and suicidal ideas. The patient is not nervous/anxious.    All other systems reviewed and are negative.    Objective:     Physical Exam   Constitutional: He is oriented to person, place, and time. He appears well-developed and well-nourished. No distress.   HENT:   Head: Normocephalic and atraumatic.   Right Ear: External ear normal.   Left Ear: External ear normal.   Mouth/Throat: Oropharynx is clear and moist. No oropharyngeal exudate.   Eyes: Conjunctivae and EOM are normal. Pupils are equal, round, and reactive to light. Right eye exhibits no discharge. Left eye exhibits no discharge. No scleral icterus.   Neck: Normal range of motion. Neck supple. No JVD present. No tracheal deviation present. No thyromegaly present.   Cardiovascular:  Normal rate and regular rhythm. Exam reveals no gallop and no friction rub.   No murmur heard.  Pulmonary/Chest: Effort normal and breath sounds normal. No stridor. No respiratory distress. He has no wheezes. He has no rales. He exhibits no tenderness.   Abdominal: Soft. He exhibits no distension and no mass. There is no tenderness. There is no rebound and no guarding.   Musculoskeletal: Normal range of motion. He exhibits no edema or tenderness.   Lymphadenopathy:     He has no cervical adenopathy.   Neurological: He is alert and oriented to person, place, and time. He has normal reflexes. He displays normal reflexes. No cranial nerve deficit. He exhibits normal muscle tone. Coordination normal.   Skin: Skin is warm and dry. No rash noted. He is not diaphoretic. No erythema. No pallor.   Psychiatric: He has a normal mood and affect. His behavior is normal. Judgment and thought content normal.   Nursing note and vitals reviewed.    Assessment:     1. Essential hypertension    2. Mixed hyperlipidemia    3. Coronary artery disease involving native coronary artery of native heart without angina pectoris    4. Venous insufficiency of lower extremity, unspecified laterality      Plan:        Medication List           Accurate as of 10/15/18 11:59 PM. If you have any questions, ask your nurse or doctor.               CONTINUE taking these medications    amLODIPine 5 MG tablet  Commonly known as:  NORVASC  Take 1 tablet (5 mg total) by mouth once daily.     brimonidine 0.1% 0.1 % Drop  Commonly known as:  ALPHAGAN P     clopidogrel 75 mg tablet  Commonly known as:  PLAVIX  TAKE ONE TABLET BY MOUTH ONCE DAILY     DESCOVY 200-25 mg Tab  Generic drug:  emtricitabine-tenofovir alafen     efavirenz 600 mg Tab  Commonly known as:  SUSTIVA     EPZICOM 600-300 mg per tablet  Generic drug:  abacavir-lamivudine     EVOTAZ 300-150 mg Tab  Generic drug:  atazanavir-cobicistat     latanoprost 0.005 % ophthalmic solution     mupirocin 2  % ointment  Commonly known as:  BACTROBAN  Apply topically once daily. for 10 days     olmesartan 40 MG tablet  Commonly known as:  BENICAR     PRALUENT  mg/mL Pnij  Generic drug:  alirocumab  Inject 1 Syringe (150 mg) into the skin every 14 (fourteen) days.     rivaroxaban 20 mg Tab  Commonly known as:  XARELTO  Take 1 tablet (20 mg total) by mouth daily with dinner or evening meal.     SYMTUZA [phone] mg Tab  Generic drug:  darunavir-gustavo-emtri-tenof ala     VITAMIN D2 50,000 unit Cap  Generic drug:  ergocalciferol     VITAMIN D3 ORAL           Where to Get Your Medications      These medications were sent to [facility]  [address] [facility]  [address] [place] [place] [address]    Phone:  [phone]   · mupirocin 2 % ointment       Essential hypertension    Mixed hyperlipidemia    Coronary artery disease involving native coronary artery of native heart without angina pectoris    Venous insufficiency of lower extremity, unspecified laterality    Other orders  -     Hypertension Digital Medicine (HDMP) Enrollment Order  -     [facility] Assign Onboarding Questionnaires  -     mupirocin (BACTROBAN) 2 % ointment; Apply topically once daily. for 10 days  Dispense: 30 g; Refill: 1

## 2018-10-30 ENCOUNTER — TELEPHONE (OUTPATIENT)
Dept: PHARMACY | Facility: CLINIC | Age: 78
End: 2018-10-30

## 2018-11-01 NOTE — TELEPHONE ENCOUNTER
"Patient mentions during refill call that he dropped a bag of frozen shrimp on his foot and it is pretty swollen. He mentions swelling of his legs and advised to have it looked at at the ER/Urgent care to be sure he is ok, but he refuses because he says that this swelling is "always" existent; it's nothing more than it usually is. He denies warmth to touch, but it does create an indentation when he presses the area; which always occurs b/c he knows he has poor circulation of his legs. He does promise to go to the ER/Urgent care if symptoms persist/worsen though. TTN  "

## 2018-11-06 ENCOUNTER — TELEPHONE (OUTPATIENT)
Dept: FAMILY MEDICINE | Facility: CLINIC | Age: 78
End: 2018-11-06

## 2018-11-06 NOTE — TELEPHONE ENCOUNTER
----- Message from Sari Mendez sent at 11/6/2018  1:15 PM CST -----  Contact: Self [phone]  Patient is requesting to talk to Lynn in regards to his flu injection. Please advice

## 2018-11-07 ENCOUNTER — CLINICAL SUPPORT (OUTPATIENT)
Dept: FAMILY MEDICINE | Facility: CLINIC | Age: 78
End: 2018-11-07
Payer: MEDICARE

## 2018-11-07 PROCEDURE — 90662 IIV NO PRSV INCREASED AG IM: CPT | Mod: S$GLB,,, | Performed by: FAMILY MEDICINE

## 2018-11-07 PROCEDURE — G0008 ADMIN INFLUENZA VIRUS VAC: HCPCS | Mod: S$GLB,,, | Performed by: FAMILY MEDICINE

## 2018-11-26 ENCOUNTER — TELEPHONE (OUTPATIENT)
Dept: PHARMACY | Facility: CLINIC | Age: 78
End: 2018-11-26

## 2018-12-12 ENCOUNTER — TELEPHONE (OUTPATIENT)
Dept: CARDIOLOGY | Facility: CLINIC | Age: 78
End: 2018-12-12

## 2018-12-12 DIAGNOSIS — E78.2 MIXED HYPERLIPIDEMIA: Primary | ICD-10-CM

## 2018-12-12 NOTE — TELEPHONE ENCOUNTER
----- Message from Renato Soliz sent at 2018  9:50 AM CST -----  Regarding: Labs needed for PA renewal  Dr. Metzger and staff,    Mr. Boggs's PA for Praluent is set to  at the end of this month.    He will need a current lipid panel in order for his insurance to re-authorize coverage.    Could you please schedule this for him at your earliest convenience?    Thank you,  Cesar Soliz  [facility]

## 2018-12-19 DIAGNOSIS — Z77.090 H/O: ASBESTOS EXPOSURE: Primary | ICD-10-CM

## 2018-12-20 ENCOUNTER — HOSPITAL ENCOUNTER (OUTPATIENT)
Dept: RADIOLOGY | Facility: HOSPITAL | Age: 78
Discharge: HOME | End: 2018-12-20
Attending: NURSE PRACTITIONER
Payer: MEDICARE

## 2018-12-20 DIAGNOSIS — Z77.090 H/O: ASBESTOS EXPOSURE: ICD-10-CM

## 2018-12-20 PROCEDURE — 71046 X-RAY EXAM CHEST 2 VIEWS: CPT | Mod: TC,FY,PO

## 2018-12-24 ENCOUNTER — LAB VISIT (OUTPATIENT)
Dept: LAB | Facility: HOSPITAL | Age: 78
End: 2018-12-24
Attending: INTERNAL MEDICINE
Payer: MEDICARE

## 2018-12-24 DIAGNOSIS — E78.2 MIXED HYPERLIPIDEMIA: ICD-10-CM

## 2018-12-24 LAB
CHOLEST SERPL-MCNC: 185 MG/DL
CHOLEST/HDLC SERPL: 4.3 {RATIO}
HDLC SERPL-MCNC: 43 MG/DL
HDLC SERPL: 23.2 %
LDLC SERPL CALC-MCNC: 125.4 MG/DL
NONHDLC SERPL-MCNC: 142 MG/DL
TRIGL SERPL-MCNC: 83 MG/DL

## 2018-12-24 PROCEDURE — 36415 COLL VENOUS BLD VENIPUNCTURE: CPT | Mod: PO

## 2018-12-24 PROCEDURE — 80061 LIPID PANEL: CPT

## 2018-12-26 ENCOUNTER — TELEPHONE (OUTPATIENT)
Dept: CARDIOLOGY | Facility: CLINIC | Age: 78
End: 2018-12-26

## 2018-12-26 NOTE — TELEPHONE ENCOUNTER
The hypertension monitoring and special pharmacy team will call and stay in touch with him        Thanks      ZN

## 2019-01-09 ENCOUNTER — HOSPITAL ENCOUNTER (OUTPATIENT)
Dept: RADIOLOGY | Facility: HOSPITAL | Age: 79
Discharge: HOME | End: 2019-01-09
Attending: INTERNAL MEDICINE
Payer: MEDICARE

## 2019-01-09 DIAGNOSIS — N18.30 CKD (CHRONIC KIDNEY DISEASE) STAGE 3, GFR 30-59 ML/MIN: ICD-10-CM

## 2019-01-09 PROCEDURE — 76770 US EXAM ABDO BACK WALL COMP: CPT | Mod: TC,PO,ER

## 2019-01-10 ENCOUNTER — TELEPHONE (OUTPATIENT)
Dept: CARDIOLOGY | Facility: HOSPITAL | Age: 79
End: 2019-01-10

## 2019-01-10 ENCOUNTER — TELEPHONE (OUTPATIENT)
Dept: CARDIOLOGY | Facility: CLINIC | Age: 79
End: 2019-01-10

## 2019-01-10 NOTE — TELEPHONE ENCOUNTER
----- Message from Manuel Metzger MD sent at 1/10/2019 10:39 AM CST -----  He needs to follow with PCP      He will be enrolled in HTN clinic      Thanks      Follow up as scheduled       ZN  ----- Message -----  From: Sofie Corado MA  Sent: 1/10/2019   8:12 AM  To: Manuel Metzger MD    Reached out to patient-     He mentions that he spoke to you in regards to an evaluation?    Help?

## 2019-01-10 NOTE — TELEPHONE ENCOUNTER
----- Message from Sofie Corado MA sent at 1/10/2019  8:12 AM CST -----  Reached out to patient-     He mentions that he spoke to you in regards to an evaluation?    Help?

## 2019-01-21 ENCOUNTER — TELEPHONE (OUTPATIENT)
Dept: PHARMACY | Facility: CLINIC | Age: 79
End: 2019-01-21

## 2019-01-21 NOTE — TELEPHONE ENCOUNTER
Praluent follow up and refill confirmed. We will ship Praluent refill on 19 via fedex to arrive on 19. $8.50 copay- 004. Confirmed 2 patient identifiers - name and .      Patient self injects Praluent without any difficulties.  Confirms rotating injection sites from stomach/both thighs and storing med in the fridge.  He has zero doses on hand, last injection on 19.  No side effects noted except itching at injection site on his stomach which solves in 2 days.  No missed doses, no new medications, no new allergies or health conditions reported at this time. All questions answered and addressed to patients satisfaction. Pt verbalized understanding.

## 2019-01-28 ENCOUNTER — TELEPHONE (OUTPATIENT)
Dept: CARDIOLOGY | Facility: CLINIC | Age: 79
End: 2019-01-28

## 2019-01-28 NOTE — TELEPHONE ENCOUNTER
----- Message from Helen Clemens sent at 1/28/2019 10:39 AM CST -----  Contact: [phone]  Patient called in requesting to speak with you. Patient prefers to speak with a nurse. Please call.

## 2019-02-05 ENCOUNTER — OFFICE VISIT (OUTPATIENT)
Dept: INTERNAL MEDICINE | Facility: CLINIC | Age: 79
End: 2019-02-05
Payer: MEDICARE

## 2019-02-05 VITALS
HEART RATE: 68 BPM | SYSTOLIC BLOOD PRESSURE: 136 MMHG | OXYGEN SATURATION: 99 % | DIASTOLIC BLOOD PRESSURE: 66 MMHG | HEIGHT: 72 IN | WEIGHT: 204.81 LBS | BODY MASS INDEX: 27.74 KG/M2

## 2019-02-05 DIAGNOSIS — I25.10 CORONARY ARTERY DISEASE INVOLVING NATIVE CORONARY ARTERY OF NATIVE HEART WITHOUT ANGINA PECTORIS: ICD-10-CM

## 2019-02-05 DIAGNOSIS — I10 ESSENTIAL HYPERTENSION: ICD-10-CM

## 2019-02-05 DIAGNOSIS — I87.2 EDEMA OF BOTH LOWER EXTREMITIES DUE TO PERIPHERAL VENOUS INSUFFICIENCY: ICD-10-CM

## 2019-02-05 DIAGNOSIS — Z21 HIV INFECTION, ASYMPTOMATIC: ICD-10-CM

## 2019-02-05 DIAGNOSIS — I73.9 PAD (PERIPHERAL ARTERY DISEASE): ICD-10-CM

## 2019-02-05 DIAGNOSIS — N18.30 CHRONIC KIDNEY DISEASE, STAGE 3: ICD-10-CM

## 2019-02-05 DIAGNOSIS — Z00.00 ANNUAL PHYSICAL EXAM: Primary | ICD-10-CM

## 2019-02-05 PROBLEM — R79.89 AZOTEMIA: Status: RESOLVED | Noted: 2017-03-20 | Resolved: 2019-02-05

## 2019-02-05 PROCEDURE — 99214 OFFICE O/P EST MOD 30 MIN: CPT | Mod: S$GLB,,, | Performed by: INTERNAL MEDICINE

## 2019-02-05 PROCEDURE — 3075F PR MOST RECENT SYSTOLIC BLOOD PRESS GE 130-139MM HG: ICD-10-PCS | Mod: CPTII,S$GLB,, | Performed by: INTERNAL MEDICINE

## 2019-02-05 PROCEDURE — 99999 PR PBB SHADOW E&M-EST. PATIENT-LVL III: ICD-10-PCS | Mod: PBBFAC,,, | Performed by: INTERNAL MEDICINE

## 2019-02-05 PROCEDURE — 3078F DIAST BP <80 MM HG: CPT | Mod: CPTII,S$GLB,, | Performed by: INTERNAL MEDICINE

## 2019-02-05 PROCEDURE — 99214 PR OFFICE/OUTPT VISIT, EST, LEVL IV, 30-39 MIN: ICD-10-PCS | Mod: S$GLB,,, | Performed by: INTERNAL MEDICINE

## 2019-02-05 PROCEDURE — 3078F PR MOST RECENT DIASTOLIC BLOOD PRESSURE < 80 MM HG: ICD-10-PCS | Mod: CPTII,S$GLB,, | Performed by: INTERNAL MEDICINE

## 2019-02-05 PROCEDURE — 99999 PR PBB SHADOW E&M-EST. PATIENT-LVL III: CPT | Mod: PBBFAC,,, | Performed by: INTERNAL MEDICINE

## 2019-02-05 PROCEDURE — 3075F SYST BP GE 130 - 139MM HG: CPT | Mod: CPTII,S$GLB,, | Performed by: INTERNAL MEDICINE

## 2019-02-05 RX ORDER — ACETAMINOPHEN 500 MG
1 TABLET ORAL 2 TIMES DAILY
Qty: 1 EACH | Refills: 0 | Status: SHIPPED | OUTPATIENT
Start: 2019-02-05 | End: 2019-06-05

## 2019-02-05 RX ORDER — NEBULIZER AND COMPRESSOR
EACH MISCELLANEOUS
Refills: 0 | Status: CANCELLED | COMMUNITY
Start: 2019-02-05

## 2019-02-05 NOTE — PATIENT INSTRUCTIONS
Recommendations for today    We recommend that you start checking blood pressure daily as described below.  Come prepared to the next visit with the new blood pressure monitor so that we can review blood pressure numbers together    If you do not receive information from [facility] on Monday regarding the status of your new blood pressure monitor order please contact my clinic directly for additional support    You should also receive communication from your cardiologist office by Monday regarding the next steps in the treatment of your conditions.  If you do not receive communication from them contact my office for additional support.    .  Watching blood pressure  · Make sure you have a blood pressure machine at home.      · Make sure you read the instructions and use it appropriately.    · Please bring blood pressure machine to your appointments initially so we can review your numbers. Your doctor may ask you to keep a log of your numbers instead.    · Check blood pressure daily    · The best time is first thing in the morning AND just prior to bedtime.    · You should sit down at a table for approximately 3-4 minutes quietly.  If you do not have time to do this then you do not have time to check blood pressure and you should wait to take the blood pressure at a different time..    · Keep a log of your blood pressure.  Write down the date and time and the blood pressure number.      · After checking blood pressure if you're not satisfied with the number you should not check on the same arm.  Instead you should check on the opposite arm.  If still not satisfied with the number should not check blood pressure again for approximately one hour.    · Comment next to any abnormally high blood pressure numbers if you can tell that there was a circumstance that led to this value (for example, taking blood pressure when you were upset, in pain or right after having a cigarette or drinking coffee).     Avoid common  mistakes when checking blood pressure  · Taking blood pressure over the top of clothing.  · Checking blood pressure repeatedly on the same arm (better to check on the other arm)  · Using blood pressure cuff that is too small (inflatable portion should cover at least 75% of your upper arm)    GOAL BLOOD PRESSURE:     Top number less than 135 and bottom number less than 80.    Please provide us with your blood pressure numbers

## 2019-02-05 NOTE — PROGRESS NOTES
Portions of this note are generated with voice recognition software. Typographical errors may exist.     SUBJECTIVE:    This is a/an 78 y.o. male here for primary care visit for  Chief Complaint   Patient presents with    Establish Care     Patient states that he does comply with his amlodipine and olmesartan.  He has a very old blood pressure monitor at home and he doubt very much the accuracy of the machine.  He also is not in the habit of checking his blood pressure at home.  He has been given an opportunity to try the digital hypertension program and is not comfortable with the new equipment and wants to return the equipment.  Patient tries to adhere to a low-salt diet.  It is unclear to what extent he uses NSAID medication in light of his history of arthritis.    Patient is followed for peripheral arterial disease and chronic venous insufficiency with a history of DVT in the left lower extremity which may explain predominant edema in the left lower extremity compared to the right lower extremity.  The patient was recommended to proceed with compression stockings despite peripheral arterial disease. He states that he did not tolerate the pressure of the compression stocking at 20-30 mmHg.       The patient follows with outside ophthalmologist for the prevention of progression of known glaucoma.  States that he complies with eyedrops.  Patient states that he has problems with driving especially at nighttime.  His wife is not able to drive due to glaucoma complications.  Transportation can be difficult at times    Patient states that he continues to follow with outside nurse practitioner specialist in the management of HIV.  He has no problems complying with his chronic HIV medication.      Medications Reviewed and Updated    Past medical, family, and social histories were reviewed and updated.    Review of Systems negative unless otherwise noted in history of present illness-  ROS    General ROS:  negative  Psychological ROS: negative  ENT ROS: negative  Endocrine ROS: Negative  Allergy and Immunology ROS: negative  Cardiovascular ROS: negative  Pulmonary ROS: Negative  Gastrointestinal ROS: negative  Genito-Urinary ROS: negative  Musculoskeletal ROS: negative  Neurological ROS: negative  Dermatological ROS: negative        Allergic:    Review of patient's allergies indicates:   Allergen Reactions    Lipitor [atorvastatin] Other (See Comments)     myalgia       OBJECTIVE:  BP: 136/66 Pulse: 68    Wt Readings from Last 3 Encounters:   02/05/19 92.9 kg (204 lb 12.9 oz)   01/09/19 95.7 kg (211 lb)   10/15/18 91.7 kg (202 lb 0.8 oz)    Body mass index is 27.78 kg/m².  Previous Blood Pressure Readings :   BP Readings from Last 3 Encounters:   02/05/19 136/66   01/09/19 (!) 148/64   10/15/18 138/72       Physical Exam    GEN: No apparent distress  HEENT: sclera non-icteric, conjunctiva clear  CV: no peripheral edema  PULM: breathing non-labored  ABD: Obese, protuberant abdomen.  PSYCH: appropriate affect  MSK: able to rise from chair without assistance  SKIN: normal skin turgor    Pertinent Labs Reviewed       ASSESSMENT/PLAN:    Annual physical exam    Edema of both lower extremities due to peripheral venous insufficiency  -     COMPRESSION STOCKINGS    HIV infection, asymptomatic    Coronary artery disease involving native coronary artery of native heart without angina pectoris    Essential hypertension  -     blood pressure monitor Kit; 1 Device by Misc.(Non-Drug; Combo Route) route 2 (two) times daily.  Dispense: 1 each; Refill: 0    Chronic kidney disease, stage 3  -     blood pressure monitor Kit; 1 Device by Misc.(Non-Drug; Combo Route) route 2 (two) times daily.  Dispense: 1 each; Refill: 0    PAD (peripheral artery disease)    Other orders  -     Cancel: BLOOD PRESSURE CUFF Misc; by Misc.(Non-Drug; Combo Route) route.; Refill: 0          Future Appointments   Date Time Provider Department Center    2/6/2019 10:30 AM [facility]   3/22/2019  9:40 AM Edward Wilkerson MD [facility]       Edward Wilkerson  2/5/2019  11:39 AM

## 2019-02-06 ENCOUNTER — TELEPHONE (OUTPATIENT)
Dept: CARDIOLOGY | Facility: CLINIC | Age: 79
End: 2019-02-06

## 2019-02-06 LAB
MICROALBUMIN URINE RANDOM: 43
MICROALBUMIN/CREATININE RATIO: 276 (ref 0–30)

## 2019-02-06 NOTE — TELEPHONE ENCOUNTER
----- Message from Manuel Metzger MD sent at 2/5/2019  9:46 PM CST -----  We will bring him in the office      Thanks      KURTIS  ----- Message -----  From: Edward Wilkerson MD  Sent: 2/5/2019  11:55 AM  To: Manuel Metzger MD    Patient just established care with me. He is wanting closer follow up in clinic with you regarding venous insufficiency. He didn't tolerate the 20-30 mmHg stockings so sending lower pressure.     He also likely needs general cardiology follow up for CAD as Ohick is no longer around. Could you do general CAD/PAD follow up annually to see if he's headed for a repeat intervention?     Just learning his case but I don't understand why he's not on a beta blocker. I will review again his issue with statin intolerance as many of these cases are not accurate. I will optimize his BP as well.

## 2019-02-18 DIAGNOSIS — I25.10 CORONARY ARTERY DISEASE, ANGINA PRESENCE UNSPECIFIED, UNSPECIFIED VESSEL OR LESION TYPE, UNSPECIFIED WHETHER NATIVE OR TRANSPLANTED HEART: ICD-10-CM

## 2019-02-19 ENCOUNTER — TELEPHONE (OUTPATIENT)
Dept: PHARMACY | Facility: CLINIC | Age: 79
End: 2019-02-19

## 2019-02-22 NOTE — TELEPHONE ENCOUNTER
Patient returned phone call back regard specialty medication refill for Praluent Pen 150mg/mL $8.50/004- Patient scheduled to have medication ship out on Mon 2/25 to arrive on Tues 2/26 address confirmed & invoice (no credit card). Patient informed no new medications, conditions or allergies since last talked to [facility]. Patient have no injection remaining (patient checked again & didn't have any injection remaining) & no missed dose. Patient declined questions for the clinical pharmacist. Patient voiced understanding.     Next injection due on Thurs 2/28   injection on the bottom/below belly button (something is wrong there/feel bump) so inject in either right or left leg per patient.

## 2019-02-27 ENCOUNTER — OFFICE VISIT (OUTPATIENT)
Dept: CARDIOLOGY | Facility: CLINIC | Age: 79
End: 2019-02-27
Payer: MEDICARE

## 2019-02-27 VITALS
HEART RATE: 64 BPM | BODY MASS INDEX: 27.09 KG/M2 | WEIGHT: 200 LBS | DIASTOLIC BLOOD PRESSURE: 70 MMHG | SYSTOLIC BLOOD PRESSURE: 130 MMHG | HEIGHT: 72 IN

## 2019-02-27 DIAGNOSIS — N18.30 CHRONIC KIDNEY DISEASE, STAGE 3: ICD-10-CM

## 2019-02-27 DIAGNOSIS — E78.2 MIXED HYPERLIPIDEMIA: ICD-10-CM

## 2019-02-27 DIAGNOSIS — I10 ESSENTIAL HYPERTENSION: ICD-10-CM

## 2019-02-27 DIAGNOSIS — I25.10 CORONARY ARTERY DISEASE INVOLVING NATIVE CORONARY ARTERY OF NATIVE HEART WITHOUT ANGINA PECTORIS: ICD-10-CM

## 2019-02-27 DIAGNOSIS — Z21 HIV INFECTION, ASYMPTOMATIC: ICD-10-CM

## 2019-02-27 DIAGNOSIS — I87.2 VENOUS INSUFFICIENCY OF BOTH LOWER EXTREMITIES: Primary | ICD-10-CM

## 2019-02-27 PROCEDURE — 99999 PR PBB SHADOW E&M-EST. PATIENT-LVL III: ICD-10-PCS | Mod: PBBFAC,,, | Performed by: INTERNAL MEDICINE

## 2019-02-27 PROCEDURE — 1101F PT FALLS ASSESS-DOCD LE1/YR: CPT | Mod: CPTII,S$GLB,, | Performed by: INTERNAL MEDICINE

## 2019-02-27 PROCEDURE — 99215 OFFICE O/P EST HI 40 MIN: CPT | Mod: S$GLB,,, | Performed by: INTERNAL MEDICINE

## 2019-02-27 PROCEDURE — 1101F PR PT FALLS ASSESS DOC 0-1 FALLS W/OUT INJ PAST YR: ICD-10-PCS | Mod: CPTII,S$GLB,, | Performed by: INTERNAL MEDICINE

## 2019-02-27 PROCEDURE — 99215 PR OFFICE/OUTPT VISIT, EST, LEVL V, 40-54 MIN: ICD-10-PCS | Mod: S$GLB,,, | Performed by: INTERNAL MEDICINE

## 2019-02-27 PROCEDURE — 99999 PR PBB SHADOW E&M-EST. PATIENT-LVL III: CPT | Mod: PBBFAC,,, | Performed by: INTERNAL MEDICINE

## 2019-02-27 PROCEDURE — 3078F PR MOST RECENT DIASTOLIC BLOOD PRESSURE < 80 MM HG: ICD-10-PCS | Mod: CPTII,S$GLB,, | Performed by: INTERNAL MEDICINE

## 2019-02-27 PROCEDURE — 3078F DIAST BP <80 MM HG: CPT | Mod: CPTII,S$GLB,, | Performed by: INTERNAL MEDICINE

## 2019-02-27 PROCEDURE — 3075F PR MOST RECENT SYSTOLIC BLOOD PRESS GE 130-139MM HG: ICD-10-PCS | Mod: CPTII,S$GLB,, | Performed by: INTERNAL MEDICINE

## 2019-02-27 PROCEDURE — 3075F SYST BP GE 130 - 139MM HG: CPT | Mod: CPTII,S$GLB,, | Performed by: INTERNAL MEDICINE

## 2019-02-27 NOTE — PROGRESS NOTES
Subjective:   Patient ID:  Osorio Boggs is a 78 y.o. male who presents for follow up of Coronary Artery Disease; Hyperlipidemia; Hypertension; and Venous Insufficiency      HPI:       He is here to follow up venous insufficiency. He is s/p R EIV and L CIV + EIV venous intervention for venous insufficiency. He is complaining of edema despite the intervention.         Ultrasound in 2/2017 revealed bilateral LSV reflux. Repeat study 2/2018 only revealed R LSV reflux even though both GSV are dilated-without reflux. EVLT of R LSV was aborted-too small and his symptoms were out of proportion to ultrasound then later he had bilateral deep venogram to treat for iliac vein compression.        He has CAD s/p PCI LAD, LCX, and RCA (2017)-CTS turned down for CABG, HTN, HLP, HIV, PAD-no claudication, and venous insufficiency presenting with complaints of recurrent leg edema, L >> R. R leg ulcers healed with wound care. He wears compression stockings and exercises regularly.       He was experiencing CCS III angina, and Dr. Adan order a stress test, which is abnormal with intermediate risk result.  Underwent cardiac cath in June - 3V CAD.  PCI of LAD and PTCA of RCA performed. Still has LCX disease and RCA disease not stented due to insufficient stent size for RCA.  Returned for PCI of proximal RCA with BMS and DONIS of proximal LAD. Symptoms resolved.           Arterial U/S indicated monophasic wave forms of RCFA and some abnormal waveforms in popliteal system of RLE. Patient denies claudication symptoms.            Venous ultrasound 2/2017     R GSV 4 mm without reflux   R LSV 5 mm with 1168 msec reflux       L GSV 6 mm without reflux   L LSV 5 mm with 860 msec reflux        Venous ultrasound 2/2018      R GSV 8 mm without reflux   R LSV 4 mm with 1620 msec reflux       L GSV 6 mm without reflux   L LSV 3 mm without reflux       Venous ultrasound 6/2018     No superficial reflux   + deep venous reflux   No DVT       US  7/27/2018    RIGHT:  No evidence of Right lower extremity DVT.    There is reflux > 500 msec in the Common Femoral, Femoral, Posterior Tibial, Peroneal, and External Iliac Veins.      LEFT:  No evidence of Left lower extremity DVT.    There is reflux > 500 msec in the Common Femoral, Femoral, Popliteal, Posterior Tibial, Peroneal, External Iliac, and Saphenofemoral Junction Veins.    GSV ak measures 0.29 cm.    GSV at SFJ measures 1.1 cm.    GSV prox measures 0.38 cm.      On the right there is an accessory GSV with reflux > 500 ms  Also noted reflux 837 ms in a . There are varicosities 0.73 in diameter    There is a left accessory without significant reflux.  There is a  with reflux of 651 ms and varicosity of 0.43.      US 2/2019      US 2/2019     No DVT     Deep reflux   R femoral + popliteal vein reflux   L cfv + femoral + popliteal vein reflux       R GSV 9.6 mm - reflux   R LSV 6.6 mm -reflux     L GSV 5.8 mm + reflux 645 msec at CFV/GSV junction   L LSV 4.7 mm - reflux   Large  connecting GSV-PT with reflux                 Patient Active Problem List    Diagnosis Date Noted    Venous insufficiency of lower extremity 05/10/2018     Priority: Low    Venous insufficiency of both lower extremities 04/25/2018     Priority: Low         5/11/2018:        S/p venogram + IV guided intervention for refractory bilateral edema.    Right EIV 55.6% stenosis + CSA 69 mm2 (Normal  mm2).    Right EIV PTV with 20 x 70 mm Wallstent with post PTV  mm2.    Left CIV 75.1% stenosis + CSA 65.9 mm2 (Normal  mm2).    Left CIV + EIV PTV with 22 x 70 + 16 x 60 mm Wallstents.    Post stenotic CIV  mm2 and EIV  mm2.        US 2/2019     No DVT     Deep reflux   R femoral + popliteal vein reflux   L cfv + femoral + popliteal vein reflux       R GSV 9.6 mm - reflux   R LSV 6.6 mm -reflux     L GSV 5.8 mm + reflux 645 msec at CFV/GSV junction   L LSV 4.7 mm - reflux   Large   connecting GSV-PT with reflux       HIV infection, asymptomatic 2019    Chronic kidney disease, stage 3 2019    Statin intolerance 10/03/2017    Coronary artery disease involving native coronary artery of native heart without angina pectoris 2017    Chronic venous insufficiency 2017       Venous ultrasound 2017     R GSV 4 mm without reflux   R LSV 5 mm with 1168 msec reflux       L GSV 6 mm without reflux   L LSV 5 mm with 860 msec reflux      Deep venous intervention 2018      S/p venogram         R EIV 55.6% stenosis               CSA 69 mm2              Normal 156 mm2           R EIV PTV with 20 x 70 mm Wallstent  Post  mm2           L CIV 75.1% stenosis              CSA 65.9 mm2              Normal 235 mm2           L CIV + EIV PTV with 22 x 70 + 16 x 60 mm Wallstents  Post stenotic  mm2             PAD (peripheral artery disease) 2017    Hypertension 2017    Hyperlipidemia 2017    Venous stasis 2017    Glaucoma 2015           Right Arm BP - Sittin/70  Left Arm BP - Sittin/70        LABS    Lipid panel  Lab Results   Component Value Date    CHOL 185 2018    CHOL 224 (H) 2018    CHOL 238 (H) 10/17/2017     Lab Results   Component Value Date    HDL 43 2018    HDL 60 2018    HDL 67 10/17/2017     Lab Results   Component Value Date    LDLCALC 125.4 2018    LDLCALC 144.2 2018    LDLCALC 160.2 (H) 10/17/2017     Lab Results   Component Value Date    TRIG 83 2018    TRIG 99 2018    TRIG 54 10/17/2017     Lab Results   Component Value Date    CHOLHDL 23.2 2018    CHOLHDL 26.8 2018    CHOLHDL 28.2 10/17/2017            Review of Systems   Constitution: Negative for diaphoresis, weakness, night sweats, weight gain and weight loss.   HENT: Negative for congestion.    Eyes: Negative for blurred vision, discharge and double vision.   Cardiovascular: Positive  for leg swelling. Negative for chest pain, claudication, cyanosis, dyspnea on exertion, irregular heartbeat, near-syncope, orthopnea, palpitations, paroxysmal nocturnal dyspnea and syncope.   Respiratory: Negative for cough, shortness of breath and wheezing.    Endocrine: Negative for cold intolerance, heat intolerance and polyphagia.   Hematologic/Lymphatic: Negative for adenopathy and bleeding problem. Does not bruise/bleed easily.   Skin: Negative for dry skin and nail changes.   Musculoskeletal: Negative for arthritis, back pain, falls, joint pain, myalgias and neck pain.   Gastrointestinal: Negative for bloating, abdominal pain, change in bowel habit and constipation.   Genitourinary: Negative for bladder incontinence, dysuria, flank pain, genital sores and missed menses.   Neurological: Negative for aphonia, brief paralysis, difficulty with concentration and dizziness.   Psychiatric/Behavioral: Negative for altered mental status and memory loss. The patient does not have insomnia.    Allergic/Immunologic: Negative for environmental allergies.       Objective:   Physical Exam   Constitutional: He is oriented to person, place, and time. He appears well-developed and well-nourished. He is not intubated.   HENT:   Head: Normocephalic and atraumatic.   Right Ear: External ear normal.   Left Ear: External ear normal.   Mouth/Throat: Oropharynx is clear and moist.   Eyes: Conjunctivae and EOM are normal. Pupils are equal, round, and reactive to light. Right eye exhibits no discharge. Left eye exhibits no discharge. No scleral icterus.   Neck: Normal range of motion. Neck supple. Normal carotid pulses, no hepatojugular reflux and no JVD present. Carotid bruit is not present. No tracheal deviation present. No thyromegaly present.   Cardiovascular: Normal rate, regular rhythm, S1 normal and S2 normal.  No extrasystoles are present. PMI is not displaced. Exam reveals no gallop, no S3, no distant heart sounds, no friction  rub and no midsystolic click.   No murmur heard.  Pulses:       Carotid pulses are 2+ on the right side, and 2+ on the left side.       Radial pulses are 2+ on the right side, and 2+ on the left side.        Femoral pulses are 2+ on the right side, and 2+ on the left side.       Popliteal pulses are 2+ on the right side, and 2+ on the left side.        Dorsalis pedis pulses are 1+ on the right side, and 1+ on the left side.        Posterior tibial pulses are 1+ on the right side, and 1+ on the left side.   Pulmonary/Chest: Effort normal and breath sounds normal. No accessory muscle usage or stridor. No apnea, no tachypnea and no bradypnea. He is not intubated. No respiratory distress. He has no decreased breath sounds. He has no wheezes. He has no rales. He exhibits no tenderness and no bony tenderness.   Abdominal: He exhibits no distension, no pulsatile liver, no abdominal bruit, no ascites, no pulsatile midline mass and no mass. There is no tenderness. There is no rebound and no guarding.   Musculoskeletal: Normal range of motion. He exhibits no edema or tenderness.   Lymphadenopathy:     He has no cervical adenopathy.       2 + L leg and 1+ R leg edema         Neurological: He is alert and oriented to person, place, and time. He has normal reflexes. No cranial nerve deficit. Coordination normal.   Skin: Skin is warm. No rash noted. No erythema. No pallor.     R shin-old scars with healed ulcers    Psychiatric: He has a normal mood and affect. His behavior is normal. Judgment and thought content normal.       Assessment:     1. Venous insufficiency of both lower extremities    2. Coronary artery disease involving native coronary artery of native heart without angina pectoris    3. Essential hypertension    4. Chronic kidney disease, stage 3    5. HIV infection, asymptomatic    6. Mixed hyperlipidemia        Plan:         Will obtain another venous insufficiency  Review findings and determine a treatment  strategy           Continue with DOAC for L POP DVT   It appears chronic not previously seen   Edema has been present prior to the DVT seen in 4/2018         Compression stockings-20-30 mmHg  Elevate legs when resting  Exercise           Medical therapy for CAD and PAD   Aspirin   Plavix   Repatha   Calcium channel blocker for angina        Continue with current medical plan and lifestyle changes.  Return sooner for concerns or questions. If symptoms persist go to the ED  I have reviewed all pertinent data on this patient     I have reviewed the patient's medical history in detail and updated the computerized patient record.    Orders Placed This Encounter   Procedures    US Lower Extremity Veins Bilateral Insuf     Standing Status:   Future     Standing Expiration Date:   2/27/2020     Order Specific Question:   May the Radiologist modify the order per protocol to meet the clinical needs of the patient?     Answer:   Yes    CV Ultrasound Lower Extremity Veins Bilateral Insuf (Cupid Only)     Standing Status:   Future     Number of Occurrences:   1     Standing Expiration Date:   2/27/2020       Follow up as scheduled. Return sooner for concerns or questions        He expressed verbal understanding and agreed with the plan      Greater than 50% of the visit of 45 minutes was spent counseling, educating, and coordinating the care of the patient.          Patient's Medications   New Prescriptions    No medications on file   Previous Medications    ALIROCUMAB (PRALUENT PEN) 150 MG/ML PNIJ    Inject 1 mL (150 mg total) into the skin every 14 (fourteen) days.    BLOOD PRESSURE MONITOR KIT    1 Device by Misc.(Non-Drug; Combo Route) route 2 (two) times daily.    BRIMONIDINE 0.1% (ALPHAGAN P) 0.1 % DROP    Place 1 drop into both eyes 2 (two) times daily.     CHOLECALCIFEROL, VITAMIN D3, (VITAMIN D3 ORAL)    Take 50,000 Units by mouth once a week.     CLOPIDOGREL (PLAVIX) 75 MG TABLET    TAKE ONE TABLET BY MOUTH ONCE  DAILY    DARUNAVIR-ROBERT-EMTRI-TENOF ALA (SYMTUZA) [phone] MG TAB    Take 1 tablet by mouth.    EFAVIRENZ (SUSTIVA) 600 MG TAB    Take 600 mg by mouth every evening.    FUROSEMIDE (LASIX) 40 MG TABLET    Take 1 tablet (40 mg total) by mouth daily as needed (for leg swelling).    LATANOPROST 0.005 % OPHTHALMIC SOLUTION    1 drop every evening.    NIFEDIPINE (PROCARDIA-XL) 30 MG (OSM) 24 HR TABLET    Take 1 tablet (30 mg total) by mouth once daily.    OLMESARTAN (BENICAR) 40 MG TABLET    Take 40 mg by mouth once daily.    RIVAROXABAN (XARELTO) 20 MG TAB    Take 1 tablet (20 mg total) by mouth daily with dinner or evening meal.   Modified Medications    No medications on file   Discontinued Medications    No medications on file

## 2019-02-28 ENCOUNTER — CLINICAL SUPPORT (OUTPATIENT)
Dept: CARDIOLOGY | Facility: CLINIC | Age: 79
End: 2019-02-28
Attending: INTERNAL MEDICINE
Payer: MEDICARE

## 2019-02-28 DIAGNOSIS — I87.2 VENOUS INSUFFICIENCY OF BOTH LOWER EXTREMITIES: ICD-10-CM

## 2019-02-28 LAB
LEFT GIAC DIA: 0.42 MM
LEFT GREAT SAPHENOUS DISTAL THIGH DIA: 0.53 MM
LEFT GREAT SAPHENOUS JUNCTION DIA: 0.45 MM
LEFT GREAT SAPHENOUS KNEE DIA: 0.58 MM
LEFT GREAT SAPHENOUS MIDDLE THIGH DIA: 0.51 MM
LEFT GREAT SAPHENOUS MIDDLE THIGH REFLUX: 645 MS
LEFT GREAT SAPHENOUS PROXIMAL CALF DIA: 0.47 MM
RIGHT GIAC DIA: 0.25 MM
RIGHT GREAT SAPHENOUS DISTAL THIGH DIA: 0.6 MM
RIGHT GREAT SAPHENOUS JUNCTION DIA: 0.96 MM
RIGHT GREAT SAPHENOUS KNEE DIA: 0.66 MM
RIGHT GREAT SAPHENOUS MIDDLE THIGH DIA: 0.46 MM
RIGHT GREAT SAPHENOUS PROXIMAL CALF DIA: 0.48 MM

## 2019-02-28 PROCEDURE — 93970 CV US LOWER VENOUS INSUFFICIENCY BILATERAL (CUPID ONLY): ICD-10-PCS | Mod: S$GLB,,, | Performed by: INTERNAL MEDICINE

## 2019-02-28 PROCEDURE — 93970 EXTREMITY STUDY: CPT | Mod: S$GLB,,, | Performed by: INTERNAL MEDICINE

## 2019-03-10 NOTE — PATIENT INSTRUCTIONS
Heart Disease Education    The heart beats 60 to 100 times per minute, 24 hours a day. This equals almost 1000,000 times a day. It pumps blood with oxygen and nutrients to the tissues and organs of the body. But the heart is a muscle and needs its own supply of blood. Blood flow to the heart is supplied by the coronary arteries. Coronary artery disease (atherosclerosis) is a result of cholesterol, saturated fat, and calcium deposits (plaques) that build up inside the walls. This causes inflammation within the coronary arteries. These plaques narrow the artery and reduce blood flow to the heart muscle. The reduction in blood flow to the heart muscle decreases oxygen supply to the heart. If the narrowing is significant enough, the oxygen supply to one or more regions of the heart can be temporarily or permanently shut down. This can cause chest pain, and possibly death of heart tissue (heart attack).  Types of chest pain  Angina is the name for pain in the heart muscle. Angina is a warning sign of serious heart disease. When untreated it can lead to a heart attack, also known as acute myocardial infarction, or AMI. Angina occurs when there is not enough blood and oxygen flowing to the heart for the amount of work it is doing. This most often happens during physical exertion, when the heart is working hardest. It is usually relieved by rest or nitroglycerin. Angina may also occur after a large meal when extra blood is sent to the digestive organs and less goes to the heart. In the case of advanced or unstable heart disease, angina can occur at rest or awaken you from sleep. Angina usually lasts from a few minutes up to 20 minutes or more. When treated early, the effects of angina can be reversed without permanent damage to the heart. Angina is a serious condition and needs to be evaluated by a medical professional immediately.  There are two types of angina -- stable and unstable:  · Stable angina usually occurs  with a predictable level of activity. Being stable, its character, severity, and occurrence do not change much over time. It usually starts with activity, and resolves with rest or taking your medicine as instructed by your doctor. The symptoms usually do not last long.  · Unstable angina changes or gets worse over time. It is different from whatever you are used to. It may feel different or worse, begin without cause, occur with exercise or exertion, wake you up from sleep, and last longer. It may not respond in the same way as it does when you take your usual medicines for an attack. This type of angina can be a warning sign of an impending heart attack.     A heart attack is usually the result of a blood clot that suddenly forms in a coronary artery that has been narrowed with plaque. When this occurs, blood flow may be cut off to a part of the heart muscle, causing the cells to die. This weakens the pumping action of the heart, which affects the delivery of blood to all the other organs in the body including the brain. This damage is not reversible. However, early treatment can limit the amount of damage.  The pain you feel with angina and a heart attack may have a similar quality. However, it is usually different in intensity and duration. Here are some typical descriptions of a heart attack:  · It is most often experienced as a squeezing, crushing, pressure-like sensation in the center of the chest.  · It is sometimes described as something heavy sitting on my chest.  · It may feel more like a bad case of indigestion.  · The pain may spread from the chest to the arm, shoulder, throat or jaw.  · Sometimes the pain is not felt in the chest at all, but only in the arm, shoulder, throat or jaw.  · There may also be nausea, vomiting, dizziness or light-headedness, sweating and trouble breathing.  · Palpitations, or your heart beating rapidly  · A new, irregular heart beat  · Unexplained weakness  You may not be  "able to tell the difference between "bad" angina and a heart attack at home. Seek help if your symptoms are different than usual. Do not be in denial or just try to "tough it out."  Call 911  This is the fastest and safest way to get to the emergency department. The paramedics can also start treatment on the way to the hospital, saving valuable time for your heart.  · If the angina gets worse, if it continues, or if it stops and returns, call 911 immediately. Do not delay. You may be having a heart attack.  · After you call 911, take a second tablet or spray unless instructed otherwise. When repeating doses, sit down if possible, because it can make you feel lightheaded or dizzy. Wait another 5 minutes. If the angina still does not go away, take a third tablet or spray. Do not take more than 3 tablets or sprays within 15 minutes. Stay on the phone with 911 for further instruction.  · Your healthcare provider may give you slightly different instructions than those above. If so, follow them carefully.  Do not wait until symptoms become severe to call 911.  Other reasons to call 911 include:  · Trouble breathing  · Feeling lightheaded, faint, or dizzy  · Rapid heart beat  · Slower than usual heart rate compared to your normal  · Angina with weakness, dizziness, fainting, heavy sweating, nausea, or vomiting  · Extreme drowsiness, confusion  · Weakness of an arm or leg or one side of the face  · Difficulty with speech or vision  When to seek medical care  Remember, the signs and symptoms of a heart attack are not always like they are on TV. Sometimes they are not so obvious. You may only feel weak, or just not right. If it is not clear or if you have any doubt, call for advice.  · Seek help if there is a change in the type of pain, if it feels different, or if your symptoms are mild.  · Do not drive yourself. Have someone else drive you. If no one can drive, call 911.  · Do not delay. Fast diagnosis and treatment can " "prevent or limit the amount of heart damage during a heart attack.  · Do not go to your doctor's office or a clinic as they may not be able to provide all the testing and treatment required for this condition.  · If your doctor has given you medicine to take when symptoms occur, take them but don't delay getting help trying to locate medicines.  What happens in the emergency department  The emergency department is connected to your local emergency medical system (EMS) through 911. That's why during a cardiac emergency, calling 911 is the fastest way to get help. The goal of the emergency department is to rapidly screen, evaluate, and treat people.  Once you are there, an electrocardiogram (ECG or heart tracing) will be done. Blood samples may be taken to look for the presence of heart enzymes that leak from damaged heart cells and show if a heart attack is occurring. You will often be evaluated by a heart specialist (cardiologist) who decides the best course of action. In the case of severe angina or early heart attack, and depending on the circumstances, powerful "clot busting" medicines can be used to dissolve blood clots in the coronary artery. In other cases, you may be taken to a cardiac catheterization lab. Here, a tiny balloon-tipped catheter is advanced through blood vessels to the heart. There the balloon is inflated pushing open the blood vessel restoring blood flow.  Risk factors for heart disease  Risk factors for heart disease are a combination of genetic and lifestyle. Many risk factors work by either directly or indirectly damaging the blood vessels of the heart, or by increasing the risk of forming blood or cholesterol clots, which then clog up and block the arteries.     Examples of physical lifestyle risk factors:  · Cigarette smoking  · High blood pressure  · High blood cholesterol  · Use of stimulant drugs such as cocaine, crack, and amphetamines  · Eating a high-fat, high-cholesterol " meal  · Diabetes   · Obesity which increases risk for diabetes and high blood pressure  · Lack of regular physical activity     Examples of emotional lifestyle factors:  · Chronic high stress levels release stress hormones. These raise blood pressure and cholesterol level and makes blood clot more easily.  · Held-in anger, hostile or cynical attitude  · Social and emotional isolation, lack of intimacy  · Loss of relationship  · Depression  Other factors that increase the risk of heart attack that you cannot control :  · Age. The older you get beyond 40, the greater is your risk of significant coronary artery disease.  · Gender. More men than women get heart disease; but once past menopause, women who are not taking estrogen replacement have the same risk as men for a heart attack.  · Family history. If your mother, father, brother or sister has coronary artery disease, your risk of having it is higher than a person your age without this family history.  What can you do to decrease your risk  To reduce your risk of heart disease:  · Get regular checkups with your doctor.  · Take your medicines for blood pressure, cholesterol or diabetes as directed.  · Watch your diet. Eat a heart healthy diet choosing fresh foods, less salt, cholesterol, and fat  · Stop smoking. Get help if needed.  · Get regular exercise.  · Manage stress.  · Carry a list of medicines and doses in your wallet.  Date Last Reviewed: 12/30/2015  © 8134-2799 [employer], [employer]. [address] [place] [address], [place] [address]. All rights reserved. This information is not intended as a substitute for professional medical care. Always follow your healthcare professional's instructions.        Understanding Chronic Venous Insufficiency  Problems with the veins in the legs may lead to chronic venous insufficiency (CVI). CVI means that there is a long-term problem with the veins not being able to pump blood back to your heart. When this happens, blood stays  in the legs and causes swelling and aching.   Two problems that may lead to chronic venous insufficiency are:  · Damaged valves. Valves keep blood flowing from the legs through the blood vessels and back to the heart. When the valves are damaged, blood does not flow as well.   · Deep vein thrombosis (DVT). Blood clots may form in the deep veins of the legs. This may cause pain, redness, and swelling in the legs. It may also block the flow of blood back to the heart. Seek immediate medical care if you have these symptoms.  · A blood clot in the leg can also break off and travel to the lungs. This is called pulmonary embolism (PE). In the lungs, the clot can cut off the flow of blood. This may cause chest pain, trouble breathing, sweating, a fast heartbeat, coughing (may cough up blood), and fainting. It is a medical emergency and may cause death. Call 911 if you have these symptoms.  · Healthcare providers call the two conditions, DVT and PE, venous thromboembolism (VTE).  CVI cant be cured, but you can control leg swelling to reduce the likelihood of ulcers (sores).  Recognizing the symptoms  Be aware of the following:  · If you stand or sit with your feet down for long periods, your legs may ache or feel heavy.  · Swollen ankles are possibly the most common symptom of CVI.  · As swelling increases, the skin over your ankles may show red spots or a brownish tinge. The skin may feel leathery or scaly, and may start to itch.  · If swelling is not controlled, an ulcer (open wound) may form.  What you can do  Reduce your risk of developing ulcers by doing the following:  · Increase blood flow back to your heart by elevating your legs, exercising daily, and wearing elastic stockings.  · Boost blood flow in your legs by losing excess weight.  · If you must stand or sit in one place for a period of time, keep your blood moving by wiggling your toes, shifting your body position, and rising up on the balls of your  feet.    Date Last Reviewed: 5/1/2016  © 0148-1521 [employer], [employer]. [address] [place] [address], [place] [address]. All rights reserved. This information is not intended as a substitute for professional medical care. Always follow your healthcare professional's instructions.

## 2019-03-18 ENCOUNTER — TELEPHONE (OUTPATIENT)
Dept: PHARMACY | Facility: CLINIC | Age: 79
End: 2019-03-18

## 2019-03-22 ENCOUNTER — OFFICE VISIT (OUTPATIENT)
Dept: INTERNAL MEDICINE | Facility: CLINIC | Age: 79
End: 2019-03-22
Payer: MEDICARE

## 2019-03-22 VITALS
HEART RATE: 62 BPM | DIASTOLIC BLOOD PRESSURE: 70 MMHG | HEIGHT: 73 IN | BODY MASS INDEX: 26.33 KG/M2 | WEIGHT: 198.63 LBS | OXYGEN SATURATION: 100 % | SYSTOLIC BLOOD PRESSURE: 136 MMHG

## 2019-03-22 DIAGNOSIS — R42 ORTHOSTATIC DIZZINESS: ICD-10-CM

## 2019-03-22 DIAGNOSIS — R06.2 WHEEZING: ICD-10-CM

## 2019-03-22 DIAGNOSIS — I10 UNCONTROLLED HYPERTENSION: Primary | ICD-10-CM

## 2019-03-22 PROCEDURE — 99214 OFFICE O/P EST MOD 30 MIN: CPT | Mod: S$GLB,,, | Performed by: INTERNAL MEDICINE

## 2019-03-22 PROCEDURE — 3075F PR MOST RECENT SYSTOLIC BLOOD PRESS GE 130-139MM HG: ICD-10-PCS | Mod: CPTII,S$GLB,, | Performed by: INTERNAL MEDICINE

## 2019-03-22 PROCEDURE — 3078F PR MOST RECENT DIASTOLIC BLOOD PRESSURE < 80 MM HG: ICD-10-PCS | Mod: CPTII,S$GLB,, | Performed by: INTERNAL MEDICINE

## 2019-03-22 PROCEDURE — 1101F PT FALLS ASSESS-DOCD LE1/YR: CPT | Mod: CPTII,S$GLB,, | Performed by: INTERNAL MEDICINE

## 2019-03-22 PROCEDURE — 99214 PR OFFICE/OUTPT VISIT, EST, LEVL IV, 30-39 MIN: ICD-10-PCS | Mod: S$GLB,,, | Performed by: INTERNAL MEDICINE

## 2019-03-22 PROCEDURE — 99999 PR PBB SHADOW E&M-EST. PATIENT-LVL III: ICD-10-PCS | Mod: PBBFAC,,, | Performed by: INTERNAL MEDICINE

## 2019-03-22 PROCEDURE — 1101F PR PT FALLS ASSESS DOC 0-1 FALLS W/OUT INJ PAST YR: ICD-10-PCS | Mod: CPTII,S$GLB,, | Performed by: INTERNAL MEDICINE

## 2019-03-22 PROCEDURE — 3075F SYST BP GE 130 - 139MM HG: CPT | Mod: CPTII,S$GLB,, | Performed by: INTERNAL MEDICINE

## 2019-03-22 PROCEDURE — 99999 PR PBB SHADOW E&M-EST. PATIENT-LVL III: CPT | Mod: PBBFAC,,, | Performed by: INTERNAL MEDICINE

## 2019-03-22 PROCEDURE — 3078F DIAST BP <80 MM HG: CPT | Mod: CPTII,S$GLB,, | Performed by: INTERNAL MEDICINE

## 2019-03-22 RX ORDER — OLMESARTAN MEDOXOMIL 40 MG/1
40 TABLET ORAL DAILY
Qty: 90 TABLET | Refills: 1 | Status: SHIPPED | OUTPATIENT
Start: 2019-03-22 | End: 2019-04-25

## 2019-03-22 NOTE — PROGRESS NOTES
Portions of this note are generated with voice recognition software. Typographical errors may exist.     SUBJECTIVE:    This is a/an 78 y.o. male here for primary care visit for  Chief Complaint   Patient presents with    Hypertension     follow up      Patient comes in today with detailed blood pressure information.  Patient has several blood pressure numbers above the target of 130 systolic.  Current medication includes carvedilol that was started about 2 days ago.  In addition the patient is also taking olmesartan 40 mg.  And furosemide 40 mg once daily.  The patient is no longer taking amlodipine.  No longer taking nifedipine.  Patient seems to believe that nifedipine caused problems with lightheadedness.  It is unclear why carvedilol was recommended as a substitute to help overcome lightheadedness. The patient states that rapid titration of blood pressure medication previously has resulted in problems with intolerable lightheadedness.    The patient states that he continues to have problems with wheezing when laying supine at nighttime.  He does not endorse any classic reflux symptoms but feels that lower respiratory tract sounds are more pronounced when he is recumbent at night.  No significant problem with daytime dyspnea.      Medications Reviewed and Updated    Past medical, family, and social histories were reviewed and updated.    Review of Systems negative unless otherwise noted in history of present illness-  ROS    General ROS: negative  Psychological ROS: negative  ENT ROS: negative  Endocrine ROS: Negative  Allergy and Immunology ROS: negative  Cardiovascular ROS: negative  Pulmonary ROS: Negative  Gastrointestinal ROS: negative  Genito-Urinary ROS: negative  Musculoskeletal ROS: negative      Allergic:    Review of patient's allergies indicates:   Allergen Reactions    Lipitor [atorvastatin] Other (See Comments)     myalgia       OBJECTIVE:  BP: 136/70 Pulse: 62    Wt Readings from Last 3  Encounters:   03/22/19 90.1 kg (198 lb 10.2 oz)   03/20/19 91.2 kg (201 lb)   02/27/19 90.7 kg (200 lb)    Body mass index is 26.21 kg/m².  Previous Blood Pressure Readings :   BP Readings from Last 3 Encounters:   03/22/19 136/70   03/20/19 (!) 140/70   02/27/19 130/70       Physical Exam    GEN: No apparent distress  HEENT: sclera non-icteric, conjunctiva clear  CV: no peripheral edema regular rate and rhythm.  No murmurs.  PULM: breathing non-labored  ABD:  protuberant abdomen.  PSYCH: appropriate affect  MSK: able to rise from chair without assistance  SKIN: normal skin turgor    Pertinent Labs Reviewed       ASSESSMENT/PLAN:    Uncontrolled hypertension.Condition not optimally controlled. Detailed counseling on self care measures. Plan to monitor clinically in addition to plan below or as listed on After Visit Summary.   -     olmesartan (BENICAR) 40 MG tablet; Take 1 tablet (40 mg total) by mouth once daily.  Dispense: 90 tablet; Refill: 1    Wheezing.Etiology unclear.  Could represent post viral postnasal drip.  Not controlled. Further evaluation warranted.  Recommendations as below or as written on After Visit Summary.     Orthostatic dizziness.Etiology unclear.  In this patient with low diastolic blood pressure, he may not tolerate strict blood pressure targets.. Further evaluation warranted.  Recommendations as below or as written on After Visit Summary.         Future Appointments   Date Time Provider Department Center   4/25/2019 11:20 AM Edward Wilkerson MD [facility]   5/1/2019  9:00 AM Myah Perry MD [facility]       Edward Wilkerson  3/22/2019  3:21 PM

## 2019-04-12 ENCOUNTER — TELEPHONE (OUTPATIENT)
Dept: INTERNAL MEDICINE | Facility: CLINIC | Age: 79
End: 2019-04-12

## 2019-04-12 NOTE — TELEPHONE ENCOUNTER
----- Message from Teo Aguirre sent at 4/12/2019  2:47 PM CDT -----  Contact: [facility]/[phone]  [facility] called to state the medication below is not available so they need an alternative for the patient.    Please call to discuss today.    olmesartan (BENICAR) 40 MG tablet

## 2019-04-15 ENCOUNTER — TELEPHONE (OUTPATIENT)
Dept: PHARMACY | Facility: CLINIC | Age: 79
End: 2019-04-15

## 2019-04-15 RX ORDER — IRBESARTAN 75 MG/1
75 TABLET ORAL NIGHTLY
Qty: 90 TABLET | Refills: 0 | Status: SHIPPED | OUTPATIENT
Start: 2019-04-15 | End: 2019-05-29

## 2019-04-25 ENCOUNTER — OFFICE VISIT (OUTPATIENT)
Dept: INTERNAL MEDICINE | Facility: CLINIC | Age: 79
End: 2019-04-25
Payer: MEDICARE

## 2019-04-25 ENCOUNTER — TELEPHONE (OUTPATIENT)
Dept: ADMINISTRATIVE | Facility: HOSPITAL | Age: 79
End: 2019-04-25

## 2019-04-25 VITALS
HEIGHT: 73 IN | DIASTOLIC BLOOD PRESSURE: 60 MMHG | BODY MASS INDEX: 27.05 KG/M2 | HEART RATE: 60 BPM | WEIGHT: 204.13 LBS | SYSTOLIC BLOOD PRESSURE: 120 MMHG | OXYGEN SATURATION: 99 %

## 2019-04-25 DIAGNOSIS — I10 ESSENTIAL HYPERTENSION: Primary | ICD-10-CM

## 2019-04-25 DIAGNOSIS — N18.30 CHRONIC KIDNEY DISEASE, STAGE 3: ICD-10-CM

## 2019-04-25 LAB — HBA1C MFR BLD: 6.2 % (ref 4–6)

## 2019-04-25 PROCEDURE — 1101F PT FALLS ASSESS-DOCD LE1/YR: CPT | Mod: CPTII,S$GLB,, | Performed by: INTERNAL MEDICINE

## 2019-04-25 PROCEDURE — 99999 PR PBB SHADOW E&M-EST. PATIENT-LVL IV: ICD-10-PCS | Mod: PBBFAC,,, | Performed by: INTERNAL MEDICINE

## 2019-04-25 PROCEDURE — 99499 RISK ADDL DX/OHS AUDIT: ICD-10-PCS | Mod: S$GLB,,, | Performed by: INTERNAL MEDICINE

## 2019-04-25 PROCEDURE — 99499 UNLISTED E&M SERVICE: CPT | Mod: S$GLB,,, | Performed by: INTERNAL MEDICINE

## 2019-04-25 PROCEDURE — 3074F PR MOST RECENT SYSTOLIC BLOOD PRESSURE < 130 MM HG: ICD-10-PCS | Mod: CPTII,S$GLB,, | Performed by: INTERNAL MEDICINE

## 2019-04-25 PROCEDURE — 3078F PR MOST RECENT DIASTOLIC BLOOD PRESSURE < 80 MM HG: ICD-10-PCS | Mod: CPTII,S$GLB,, | Performed by: INTERNAL MEDICINE

## 2019-04-25 PROCEDURE — 3078F DIAST BP <80 MM HG: CPT | Mod: CPTII,S$GLB,, | Performed by: INTERNAL MEDICINE

## 2019-04-25 PROCEDURE — 99213 OFFICE O/P EST LOW 20 MIN: CPT | Mod: S$GLB,,, | Performed by: INTERNAL MEDICINE

## 2019-04-25 PROCEDURE — 1101F PR PT FALLS ASSESS DOC 0-1 FALLS W/OUT INJ PAST YR: ICD-10-PCS | Mod: CPTII,S$GLB,, | Performed by: INTERNAL MEDICINE

## 2019-04-25 PROCEDURE — 3074F SYST BP LT 130 MM HG: CPT | Mod: CPTII,S$GLB,, | Performed by: INTERNAL MEDICINE

## 2019-04-25 PROCEDURE — 99213 PR OFFICE/OUTPT VISIT, EST, LEVL III, 20-29 MIN: ICD-10-PCS | Mod: S$GLB,,, | Performed by: INTERNAL MEDICINE

## 2019-04-25 PROCEDURE — 99999 PR PBB SHADOW E&M-EST. PATIENT-LVL IV: CPT | Mod: PBBFAC,,, | Performed by: INTERNAL MEDICINE

## 2019-04-25 RX ORDER — CARVEDILOL 6.25 MG/1
6.25 TABLET ORAL 2 TIMES DAILY
Qty: 180 TABLET | Refills: 1 | Status: SHIPPED | OUTPATIENT
Start: 2019-04-25 | End: 2019-06-06 | Stop reason: SDUPTHER

## 2019-04-25 NOTE — PATIENT INSTRUCTIONS
Recommendations for today    Because evening blood pressure is still not at target we recommend increasing the dosage of carvedilol to 6.25 mg twice daily.  Seven days after starting the higher dosage you should start to notice that both the morning and the evening blood pressure are within the target range.    If mild side effects develop on carvedilol 6.25 mg simply continue taking the medication.  Mild side effects tend to go away after taking the medication for 14 days.  If side effects persist or worsen stop taking the medication and contact the clinic.    Possible side effects would include lightheadedness when standing up or sitting up fast.  Others would include unusual fatigue.    The top number for blood pressure should be consistently less than 135 in the bottom number should be less than 80.

## 2019-04-27 NOTE — PROGRESS NOTES
Portions of this note are generated with voice recognition software. Typographical errors may exist.     SUBJECTIVE:    This is a/an 78 y.o. male here for primary care visit for  Chief Complaint   Patient presents with    Hypertension     1m       Patient is complying with home blood pressure medication.  Comes in with detailed blood pressure numbers indicating consistent evening time blood pressure above target.  Patient denies any orthostatic dizziness on his current regimen.  Patient denies NSAID misuse or abuse.  Plans to follow-up soon with Nephrology.    States that coughing and wheezing noted when laying supine is no longer an issue.  Went away on its own.  No problems with fluid retention along the ankles.        Medications Reviewed and Updated    Past medical, family, and social histories were reviewed and updated.    Review of Systems negative unless otherwise noted in history of present illness-  ROS    General ROS: negative  Psychological ROS: negative  ENT ROS: negative  Endocrine ROS: Negative  Allergy and Immunology ROS: negative  Cardiovascular ROS: negative  Pulmonary ROS: Negative  Gastrointestinal ROS: negative  Genito-Urinary ROS: negative  Musculoskeletal ROS: negative        Allergic:    Review of patient's allergies indicates:   Allergen Reactions    Lipitor [atorvastatin] Other (See Comments)     myalgia       OBJECTIVE:  BP: 120/60 Pulse: 60    Wt Readings from Last 3 Encounters:   04/25/19 92.6 kg (204 lb 2.3 oz)   03/22/19 90.1 kg (198 lb 10.2 oz)   03/20/19 91.2 kg (201 lb)    Body mass index is 26.93 kg/m².  Previous Blood Pressure Readings :   BP Readings from Last 3 Encounters:   04/25/19 120/60   03/22/19 136/70   03/20/19 (!) 140/70       Physical Exam    GEN: No apparent distress  HEENT: sclera non-icteric, conjunctiva clear  CV: no peripheral edema  PULM: breathing non-labored  ABD: non, protuberant abdomen.  PSYCH: appropriate affect  MSK: able to rise from chair without  assistance  SKIN: normal skin turgor    Pertinent Labs Reviewed       ASSESSMENT/PLAN:    Essential hypertension..Condition not optimally controlled. Detailed counseling on self care measures. Plan to monitor clinically in addition to plan below or as listed on After Visit Summary.   -     carvedilol (COREG) 6.25 MG tablet; Take 1 tablet (6.25 mg total) by mouth 2 (two) times daily.  Dispense: 180 tablet; Refill: 1    Chronic kidney disease, stage 3.Condition stable.  Counseling given today on self-care measures. Plan to monitor clinically. Continue current medical plan.         Future Appointments   Date Time Provider Department Center   5/1/2019  9:00 AM Myah Perry MD [facility]   6/27/2019 10:40 AM Edward Wilkerson MD [facility]       Edward Wilkerson  4/28/2019  12:59 PM

## 2019-05-07 ENCOUNTER — TELEPHONE (OUTPATIENT)
Dept: CARDIOLOGY | Facility: CLINIC | Age: 79
End: 2019-05-07

## 2019-05-07 NOTE — TELEPHONE ENCOUNTER
----- Message from Manuel Metzger MD sent at 5/1/2019  3:26 PM CDT -----  We will get him in the office      Thanks      KURTIS  ----- Message -----  From: Myah Perry MD  Sent: 5/1/2019   8:56 AM  To: Manuel Metzger MD    Worsening LE edema --> pt is concern that he has stent reocclusion> wants to see you

## 2019-05-14 RX ORDER — RIVAROXABAN 20 MG/1
TABLET, FILM COATED ORAL
Qty: 30 TABLET | Refills: 11 | Status: SHIPPED | OUTPATIENT
Start: 2019-05-14 | End: 2019-10-02

## 2019-05-16 ENCOUNTER — TELEPHONE (OUTPATIENT)
Dept: PHARMACY | Facility: CLINIC | Age: 79
End: 2019-05-16

## 2019-06-05 DIAGNOSIS — I82.532 CHRONIC DEEP VEIN THROMBOSIS (DVT) OF POPLITEAL VEIN OF LEFT LOWER EXTREMITY: ICD-10-CM

## 2019-06-06 ENCOUNTER — PATIENT MESSAGE (OUTPATIENT)
Dept: FAMILY MEDICINE | Facility: CLINIC | Age: 79
End: 2019-06-06

## 2019-06-06 RX ORDER — CARVEDILOL 6.25 MG/1
6.25 TABLET ORAL 2 TIMES DAILY
Qty: 180 TABLET | Refills: 1 | Status: SHIPPED | OUTPATIENT
Start: 2019-06-06 | End: 2019-06-27

## 2019-06-06 RX ORDER — CARVEDILOL 12.5 MG/1
12.5 TABLET ORAL 2 TIMES DAILY
Qty: 180 TABLET | Refills: 1 | Status: SHIPPED | OUTPATIENT
Start: 2019-06-06 | End: 2019-06-06

## 2019-06-13 ENCOUNTER — TELEPHONE (OUTPATIENT)
Dept: PHARMACY | Facility: CLINIC | Age: 79
End: 2019-06-13

## 2019-06-25 ENCOUNTER — TELEPHONE (OUTPATIENT)
Dept: INTERNAL MEDICINE | Facility: CLINIC | Age: 79
End: 2019-06-25

## 2019-06-25 DIAGNOSIS — Z21 HIV, ASYMPTOMATIC: ICD-10-CM

## 2019-06-25 DIAGNOSIS — N18.9 CHRONIC KIDNEY DISEASE, UNSPECIFIED CKD STAGE: Primary | ICD-10-CM

## 2019-06-25 DIAGNOSIS — Z00.00 BLOOD TESTS FOR ROUTINE GENERAL PHYSICAL EXAMINATION: ICD-10-CM

## 2019-06-25 DIAGNOSIS — I10 ESSENTIAL HYPERTENSION: ICD-10-CM

## 2019-06-25 NOTE — TELEPHONE ENCOUNTER
Left message for patient to call office back to set lab appointment prior to his visit on 06/27/19 for a cmp.

## 2019-06-25 NOTE — TELEPHONE ENCOUNTER
Patient will be doing lab prior to his appointment with   Dr Wilkerson on  06/27/19.  Patient will be going to [place] for lab on 06/26/19.  Patient verbalized understanding.

## 2019-06-25 NOTE — TELEPHONE ENCOUNTER
----- Message from Edward Wilkerson MD sent at 6/25/2019 12:39 PM CDT -----  Please contact patient regarding the need to complete blood work today or tomorrow in anticipation of his follow-up appointment with me.  Have the following blood work completed    CMP

## 2019-06-26 ENCOUNTER — OFFICE VISIT (OUTPATIENT)
Dept: CARDIOLOGY | Facility: CLINIC | Age: 79
End: 2019-06-26
Payer: MEDICARE

## 2019-06-26 ENCOUNTER — LAB VISIT (OUTPATIENT)
Dept: LAB | Facility: HOSPITAL | Age: 79
End: 2019-06-26
Attending: INTERNAL MEDICINE
Payer: MEDICARE

## 2019-06-26 VITALS — HEART RATE: 70 BPM | SYSTOLIC BLOOD PRESSURE: 120 MMHG | DIASTOLIC BLOOD PRESSURE: 70 MMHG

## 2019-06-26 DIAGNOSIS — Z21 HIV, ASYMPTOMATIC: ICD-10-CM

## 2019-06-26 DIAGNOSIS — E78.2 MIXED HYPERLIPIDEMIA: ICD-10-CM

## 2019-06-26 DIAGNOSIS — I10 ESSENTIAL HYPERTENSION: ICD-10-CM

## 2019-06-26 DIAGNOSIS — I73.9 PAD (PERIPHERAL ARTERY DISEASE): ICD-10-CM

## 2019-06-26 DIAGNOSIS — N18.9 CHRONIC KIDNEY DISEASE, UNSPECIFIED CKD STAGE: ICD-10-CM

## 2019-06-26 DIAGNOSIS — I25.10 CORONARY ARTERY DISEASE INVOLVING NATIVE CORONARY ARTERY OF NATIVE HEART WITHOUT ANGINA PECTORIS: ICD-10-CM

## 2019-06-26 DIAGNOSIS — Z00.00 BLOOD TESTS FOR ROUTINE GENERAL PHYSICAL EXAMINATION: ICD-10-CM

## 2019-06-26 DIAGNOSIS — I87.2 VENOUS INSUFFICIENCY OF BOTH LOWER EXTREMITIES: Primary | ICD-10-CM

## 2019-06-26 LAB
ALBUMIN SERPL BCP-MCNC: 3.9 G/DL (ref 3.5–5.2)
ALP SERPL-CCNC: 102 U/L (ref 38–126)
ALT SERPL W/O P-5'-P-CCNC: 11 U/L (ref 10–44)
ANION GAP SERPL CALC-SCNC: 10 MMOL/L (ref 8–16)
AST SERPL-CCNC: 24 U/L (ref 15–46)
BILIRUB SERPL-MCNC: 0.3 MG/DL (ref 0.1–1)
BUN SERPL-MCNC: 40 MG/DL (ref 2–20)
CALCIUM SERPL-MCNC: 8.8 MG/DL (ref 8.7–10.5)
CHLORIDE SERPL-SCNC: 108 MMOL/L (ref 95–110)
CHOLEST SERPL-MCNC: 159 MG/DL (ref 120–199)
CHOLEST/HDLC SERPL: 3.5 {RATIO} (ref 2–5)
CO2 SERPL-SCNC: 21 MMOL/L (ref 23–29)
CREAT SERPL-MCNC: 1.71 MG/DL (ref 0.5–1.4)
EST. GFR  (AFRICAN AMERICAN): 42.8 ML/MIN/1.73 M^2
EST. GFR  (NON AFRICAN AMERICAN): 37 ML/MIN/1.73 M^2
GLUCOSE SERPL-MCNC: 103 MG/DL (ref 70–110)
HDLC SERPL-MCNC: 45 MG/DL (ref 40–75)
HDLC SERPL: 28.3 % (ref 20–50)
LDLC SERPL CALC-MCNC: 97.8 MG/DL (ref 63–159)
NONHDLC SERPL-MCNC: 114 MG/DL
POTASSIUM SERPL-SCNC: 4.3 MMOL/L (ref 3.5–5.1)
PROT SERPL-MCNC: 7.3 G/DL (ref 6–8.4)
SODIUM SERPL-SCNC: 139 MMOL/L (ref 136–145)
TRIGL SERPL-MCNC: 81 MG/DL (ref 30–150)

## 2019-06-26 PROCEDURE — 99499 RISK ADDL DX/OHS AUDIT: ICD-10-PCS | Mod: S$GLB,,, | Performed by: INTERNAL MEDICINE

## 2019-06-26 PROCEDURE — 99215 OFFICE O/P EST HI 40 MIN: CPT | Mod: S$GLB,,, | Performed by: INTERNAL MEDICINE

## 2019-06-26 PROCEDURE — 99215 PR OFFICE/OUTPT VISIT, EST, LEVL V, 40-54 MIN: ICD-10-PCS | Mod: S$GLB,,, | Performed by: INTERNAL MEDICINE

## 2019-06-26 PROCEDURE — 3078F DIAST BP <80 MM HG: CPT | Mod: CPTII,S$GLB,, | Performed by: INTERNAL MEDICINE

## 2019-06-26 PROCEDURE — 99999 PR PBB SHADOW E&M-EST. PATIENT-LVL III: ICD-10-PCS | Mod: PBBFAC,,, | Performed by: INTERNAL MEDICINE

## 2019-06-26 PROCEDURE — 86592 SYPHILIS TEST NON-TREP QUAL: CPT | Mod: PO

## 2019-06-26 PROCEDURE — 3074F SYST BP LT 130 MM HG: CPT | Mod: CPTII,S$GLB,, | Performed by: INTERNAL MEDICINE

## 2019-06-26 PROCEDURE — 80053 COMPREHEN METABOLIC PANEL: CPT | Mod: PO

## 2019-06-26 PROCEDURE — 3074F PR MOST RECENT SYSTOLIC BLOOD PRESSURE < 130 MM HG: ICD-10-PCS | Mod: CPTII,S$GLB,, | Performed by: INTERNAL MEDICINE

## 2019-06-26 PROCEDURE — 87536 HIV-1 QUANT&REVRSE TRNSCRPJ: CPT | Mod: PO

## 2019-06-26 PROCEDURE — 86361 T CELL ABSOLUTE COUNT: CPT | Mod: PO

## 2019-06-26 PROCEDURE — 3078F PR MOST RECENT DIASTOLIC BLOOD PRESSURE < 80 MM HG: ICD-10-PCS | Mod: CPTII,S$GLB,, | Performed by: INTERNAL MEDICINE

## 2019-06-26 PROCEDURE — 99999 PR PBB SHADOW E&M-EST. PATIENT-LVL III: CPT | Mod: PBBFAC,,, | Performed by: INTERNAL MEDICINE

## 2019-06-26 PROCEDURE — 99499 UNLISTED E&M SERVICE: CPT | Mod: S$GLB,,, | Performed by: INTERNAL MEDICINE

## 2019-06-26 PROCEDURE — 1101F PR PT FALLS ASSESS DOC 0-1 FALLS W/OUT INJ PAST YR: ICD-10-PCS | Mod: CPTII,S$GLB,, | Performed by: INTERNAL MEDICINE

## 2019-06-26 PROCEDURE — 80061 LIPID PANEL: CPT

## 2019-06-26 PROCEDURE — 1101F PT FALLS ASSESS-DOCD LE1/YR: CPT | Mod: CPTII,S$GLB,, | Performed by: INTERNAL MEDICINE

## 2019-06-26 RX ORDER — DIPHENHYDRAMINE HCL 25 MG
50 CAPSULE ORAL ONCE
Status: CANCELLED | OUTPATIENT
Start: 2019-06-26 | End: 2019-06-26

## 2019-06-26 NOTE — H&P (VIEW-ONLY)
Subjective:   Patient ID:  Osorio Boggs is a 78 y.o. male who presents for follow up of Coronary Artery Disease; Hyperlipidemia; Hypertension; Peripheral Arterial Disease; and Chronic Venous Insufficiency      HPI:       He is here to follow up venous insufficiency. He is s/p R EIV and L CIV + EIV venous intervention for venous insufficiency. He is complaining of edema despite the intervention.         Ultrasound in 2/2017 revealed bilateral LSV reflux. Repeat study 2/2018 only revealed R LSV reflux even though both GSV are dilated-without reflux. EVLT of R LSV was aborted-too small and his symptoms were out of proportion to ultrasound then later he had bilateral deep venogram to treat for iliac vein compression.        He has CAD s/p PCI LAD, LCX, and RCA (2017)-CTS turned down for CABG, HTN, HLP, HIV, PAD-no claudication, and venous insufficiency presenting with complaints of recurrent leg edema, L >> R. R leg ulcers healed with wound care. He wears compression stockings and exercises regularly.       He was experiencing CCS III angina, and Dr. Adan order a stress test, which is abnormal with intermediate risk result.  Underwent cardiac cath in June - 3V CAD.  PCI of LAD and PTCA of RCA performed. Still has LCX disease and RCA disease not stented due to insufficient stent size for RCA.  Returned for PCI of proximal RCA with BMS and DONIS of proximal LAD. Symptoms resolved.           Arterial U/S indicated monophasic wave forms of RCFA and some abnormal waveforms in popliteal system of RLE. Patient denies claudication symptoms.            Venous ultrasound 2/2017     R GSV 4 mm without reflux   R LSV 5 mm with 1168 msec reflux       L GSV 6 mm without reflux   L LSV 5 mm with 860 msec reflux        Venous ultrasound 2/2018      R GSV 8 mm without reflux   R LSV 4 mm with 1620 msec reflux       L GSV 6 mm without reflux   L LSV 3 mm without reflux       Venous ultrasound 6/2018     No superficial reflux   + deep  venous reflux   No DVT       US 7/27/2018    RIGHT:  No evidence of Right lower extremity DVT.    There is reflux > 500 msec in the Common Femoral, Femoral, Posterior Tibial, Peroneal, and External Iliac Veins.      LEFT:  No evidence of Left lower extremity DVT.    There is reflux > 500 msec in the Common Femoral, Femoral, Popliteal, Posterior Tibial, Peroneal, External Iliac, and Saphenofemoral Junction Veins.    GSV ak measures 0.29 cm.    GSV at SFJ measures 1.1 cm.    GSV prox measures 0.38 cm.      On the right there is an accessory GSV with reflux > 500 ms  Also noted reflux 837 ms in a . There are varicosities 0.73 in diameter    There is a left accessory without significant reflux.  There is a  with reflux of 651 ms and varicosity of 0.43.      US 2/2019      US 2/2019     No DVT     Deep reflux   R femoral + popliteal vein reflux   L cfv + femoral + popliteal vein reflux       R GSV 9.6 mm - reflux   R LSV 6.6 mm -reflux     L GSV 5.8 mm + reflux 645 msec at CFV/GSV junction   L LSV 4.7 mm - reflux   Large  connecting GSV-PT with reflux                 Patient Active Problem List    Diagnosis Date Noted    Venous insufficiency of both lower extremities 04/25/2018     Priority: Low         5/11/2018:        S/p venogram + IV guided intervention for refractory bilateral edema.    Right EIV 55.6% stenosis + CSA 69 mm2 (Normal  mm2).    Right EIV PTV with 20 x 70 mm Wallstent with post PTV  mm2.    Left CIV 75.1% stenosis + CSA 65.9 mm2 (Normal  mm2).    Left CIV + EIV PTV with 22 x 70 + 16 x 60 mm Wallstents.    Post stenotic CIV  mm2 and EIV  mm2.        US 2/2019     No DVT     Deep reflux   R femoral + popliteal vein reflux   L cfv + femoral + popliteal vein reflux       R GSV 9.6 mm - reflux   R LSV 6.6 mm -reflux     L GSV 5.8 mm + reflux 645 msec at CFV/GSV junction   L LSV 4.7 mm - reflux   Large  connecting GSV-PT with reflux        Chronic deep vein thrombosis (DVT) of popliteal vein of left lower extremity 2019    HIV infection, asymptomatic 2019    Chronic kidney disease, stage 3 2019    Statin intolerance 10/03/2017    Coronary artery disease involving native coronary artery of native heart without angina pectoris 2017    PAD (peripheral artery disease) 2017    Hypertension 2017    Hyperlipidemia 2017    Glaucoma 2015           Right Arm BP - Sittin/70  Left Arm BP - Sittin/70        LABS    Lipid panel  Lab Results   Component Value Date    CHOL 185 2018    CHOL 224 (H) 2018    CHOL 238 (H) 10/17/2017     Lab Results   Component Value Date    HDL 43 2018    HDL 60 2018    HDL 67 10/17/2017     Lab Results   Component Value Date    LDLCALC 125.4 2018    LDLCALC 144.2 2018    LDLCALC 160.2 (H) 10/17/2017     Lab Results   Component Value Date    TRIG 83 2018    TRIG 99 2018    TRIG 54 10/17/2017     Lab Results   Component Value Date    CHOLHDL 23.2 2018    CHOLHDL 26.8 2018    CHOLHDL 28.2 10/17/2017            Review of Systems   Constitution: Negative for diaphoresis, night sweats, weight gain and weight loss.   HENT: Negative for congestion.    Eyes: Negative for blurred vision, discharge and double vision.   Cardiovascular: Positive for leg swelling. Negative for chest pain, claudication, cyanosis, dyspnea on exertion, irregular heartbeat, near-syncope, orthopnea, palpitations, paroxysmal nocturnal dyspnea and syncope.   Respiratory: Negative for cough, shortness of breath and wheezing.    Endocrine: Negative for cold intolerance, heat intolerance and polyphagia.   Hematologic/Lymphatic: Negative for adenopathy and bleeding problem. Does not bruise/bleed easily.   Skin: Negative for dry skin and nail changes.   Musculoskeletal: Negative for arthritis, back pain, falls, joint pain, myalgias and neck pain.    Gastrointestinal: Negative for bloating, abdominal pain, change in bowel habit and constipation.   Genitourinary: Negative for bladder incontinence, dysuria, flank pain, genital sores and missed menses.   Neurological: Negative for aphonia, brief paralysis, difficulty with concentration, dizziness and weakness.   Psychiatric/Behavioral: Negative for altered mental status and memory loss. The patient does not have insomnia.    Allergic/Immunologic: Negative for environmental allergies.       Objective:   Physical Exam   Constitutional: He is oriented to person, place, and time. He appears well-developed and well-nourished. He is not intubated.   HENT:   Head: Normocephalic and atraumatic.   Right Ear: External ear normal.   Left Ear: External ear normal.   Mouth/Throat: Oropharynx is clear and moist.   Eyes: Pupils are equal, round, and reactive to light. Conjunctivae and EOM are normal. Right eye exhibits no discharge. Left eye exhibits no discharge. No scleral icterus.   Neck: Normal range of motion. Neck supple. Normal carotid pulses, no hepatojugular reflux and no JVD present. Carotid bruit is not present. No tracheal deviation present. No thyromegaly present.   Cardiovascular: Normal rate, regular rhythm, S1 normal and S2 normal.  No extrasystoles are present. PMI is not displaced. Exam reveals no gallop, no S3, no distant heart sounds, no friction rub and no midsystolic click.   No murmur heard.  Pulses:       Carotid pulses are 2+ on the right side, and 2+ on the left side.       Radial pulses are 2+ on the right side, and 2+ on the left side.        Femoral pulses are 2+ on the right side, and 2+ on the left side.       Popliteal pulses are 2+ on the right side, and 2+ on the left side.        Dorsalis pedis pulses are 1+ on the right side, and 1+ on the left side.        Posterior tibial pulses are 1+ on the right side, and 1+ on the left side.   Pulmonary/Chest: Effort normal and breath sounds normal. No  accessory muscle usage or stridor. No apnea, no tachypnea and no bradypnea. He is not intubated. No respiratory distress. He has no decreased breath sounds. He has no wheezes. He has no rales. He exhibits no tenderness and no bony tenderness.   Abdominal: He exhibits no distension, no pulsatile liver, no abdominal bruit, no ascites, no pulsatile midline mass and no mass. There is no tenderness. There is no rebound and no guarding.   Musculoskeletal: Normal range of motion. He exhibits no edema or tenderness.   Lymphadenopathy:     He has no cervical adenopathy.       2 + L leg and 1+ R leg edema         Neurological: He is alert and oriented to person, place, and time. He has normal reflexes. No cranial nerve deficit. Coordination normal.   Skin: Skin is warm. No rash noted. No erythema. No pallor.     R shin-old scars with healed ulcers    Psychiatric: He has a normal mood and affect. His behavior is normal. Judgment and thought content normal.       Assessment:     1. Venous insufficiency of both lower extremities    2. Essential hypertension    3. Mixed hyperlipidemia    4. Coronary artery disease involving native coronary artery of native heart without angina pectoris    5. PAD (peripheral artery disease)        Plan:         EVLT of L GSV for refractory reflux complicated with edema             Continue with DOAC for L POP DVT   It appears chronic not previously seen   Edema has been present prior to the DVT seen in 4/2018         Compression stockings-20-30 mmHg  Elevate legs when resting  Exercise           Medical therapy for CAD and PAD   Aspirin   Plavix   Repatha   Calcium channel blocker for angina        Continue with current medical plan and lifestyle changes.  Return sooner for concerns or questions. If symptoms persist go to the ED  I have reviewed all pertinent data on this patient     I have reviewed the patient's medical history in detail and updated the computerized patient record.    Orders  Placed This Encounter   Procedures    Case Request-Cath Lab: Ablation, Vein, Peripheral, Percutaneous, Endovenous, Using Laser     Standing Status:   Standing     Number of Occurrences:   1     Order Specific Question:   CPT Code:     Answer:   AL ENDOVENOUS LASER, 1ST VEIN [05829]     Order Specific Question:   Medical Necessity:     Answer:   Medically Urgent [101]       Follow up as scheduled. Return sooner for concerns or questions        He expressed verbal understanding and agreed with the plan      Greater than 50% of the visit of 45 minutes was spent counseling, educating, and coordinating the care of the patient.          Patient's Medications   New Prescriptions    No medications on file   Previous Medications    ALIROCUMAB (PRALUENT PEN) 150 MG/ML PNIJ    Inject 1 mL (150 mg total) into the skin every 14 (fourteen) days.    ALLOPURINOL (ZYLOPRIM) 100 MG TABLET    Take 1 tablet (100 mg total) by mouth once daily.    BRIMONIDINE 0.1% (ALPHAGAN P) 0.1 % DROP    Place 1 drop into both eyes 2 (two) times daily.     CARVEDILOL (COREG) 6.25 MG TABLET    Take 1 tablet (6.25 mg total) by mouth 2 (two) times daily.    CHOLECALCIFEROL, VITAMIN D3, (VITAMIN D3 ORAL)    Take 50,000 Units by mouth once a week.     CLOPIDOGREL (PLAVIX) 75 MG TABLET    TAKE ONE TABLET BY MOUTH ONCE DAILY    DARUNAVIR-ROBERT-EMTRI-TENOF ALA (SYMTUZA) [phone] MG TAB    Take 1 tablet by mouth.    EFAVIRENZ (SUSTIVA) 600 MG TAB    Take 600 mg by mouth every evening.    FUROSEMIDE (LASIX) 80 MG TABLET    Take 1 tablet (80 mg total) by mouth daily as needed (for leg swelling).    LATANOPROST 0.005 % OPHTHALMIC SOLUTION    1 drop every evening.    NIFEDIPINE (PROCARDIA-XL) 30 MG (OSM) 24 HR TABLET    Take 1 tablet (30 mg total) by mouth once daily.    XARELTO 20 MG TAB    TAKE 1 TABLET BY MOUTH ONCE DAILY WITH DINNER OR EVENING MEAL   Modified Medications    No medications on file   Discontinued Medications    No medications on file

## 2019-06-26 NOTE — PROGRESS NOTES
Subjective:   Patient ID:  Osorio Boggs is a 78 y.o. male who presents for follow up of Coronary Artery Disease; Hyperlipidemia; Hypertension; Peripheral Arterial Disease; and Chronic Venous Insufficiency      HPI:       He is here to follow up venous insufficiency. He is s/p R EIV and L CIV + EIV venous intervention for venous insufficiency. He is complaining of edema despite the intervention.         Ultrasound in 2/2017 revealed bilateral LSV reflux. Repeat study 2/2018 only revealed R LSV reflux even though both GSV are dilated-without reflux. EVLT of R LSV was aborted-too small and his symptoms were out of proportion to ultrasound then later he had bilateral deep venogram to treat for iliac vein compression.        He has CAD s/p PCI LAD, LCX, and RCA (2017)-CTS turned down for CABG, HTN, HLP, HIV, PAD-no claudication, and venous insufficiency presenting with complaints of recurrent leg edema, L >> R. R leg ulcers healed with wound care. He wears compression stockings and exercises regularly.       He was experiencing CCS III angina, and Dr. Adan order a stress test, which is abnormal with intermediate risk result.  Underwent cardiac cath in June - 3V CAD.  PCI of LAD and PTCA of RCA performed. Still has LCX disease and RCA disease not stented due to insufficient stent size for RCA.  Returned for PCI of proximal RCA with BMS and DONIS of proximal LAD. Symptoms resolved.           Arterial U/S indicated monophasic wave forms of RCFA and some abnormal waveforms in popliteal system of RLE. Patient denies claudication symptoms.            Venous ultrasound 2/2017     R GSV 4 mm without reflux   R LSV 5 mm with 1168 msec reflux       L GSV 6 mm without reflux   L LSV 5 mm with 860 msec reflux        Venous ultrasound 2/2018      R GSV 8 mm without reflux   R LSV 4 mm with 1620 msec reflux       L GSV 6 mm without reflux   L LSV 3 mm without reflux       Venous ultrasound 6/2018     No superficial reflux   + deep  venous reflux   No DVT       US 7/27/2018    RIGHT:  No evidence of Right lower extremity DVT.    There is reflux > 500 msec in the Common Femoral, Femoral, Posterior Tibial, Peroneal, and External Iliac Veins.      LEFT:  No evidence of Left lower extremity DVT.    There is reflux > 500 msec in the Common Femoral, Femoral, Popliteal, Posterior Tibial, Peroneal, External Iliac, and Saphenofemoral Junction Veins.    GSV ak measures 0.29 cm.    GSV at SFJ measures 1.1 cm.    GSV prox measures 0.38 cm.      On the right there is an accessory GSV with reflux > 500 ms  Also noted reflux 837 ms in a . There are varicosities 0.73 in diameter    There is a left accessory without significant reflux.  There is a  with reflux of 651 ms and varicosity of 0.43.      US 2/2019      US 2/2019     No DVT     Deep reflux   R femoral + popliteal vein reflux   L cfv + femoral + popliteal vein reflux       R GSV 9.6 mm - reflux   R LSV 6.6 mm -reflux     L GSV 5.8 mm + reflux 645 msec at CFV/GSV junction   L LSV 4.7 mm - reflux   Large  connecting GSV-PT with reflux                 Patient Active Problem List    Diagnosis Date Noted    Venous insufficiency of both lower extremities 04/25/2018     Priority: Low         5/11/2018:        S/p venogram + IV guided intervention for refractory bilateral edema.    Right EIV 55.6% stenosis + CSA 69 mm2 (Normal  mm2).    Right EIV PTV with 20 x 70 mm Wallstent with post PTV  mm2.    Left CIV 75.1% stenosis + CSA 65.9 mm2 (Normal  mm2).    Left CIV + EIV PTV with 22 x 70 + 16 x 60 mm Wallstents.    Post stenotic CIV  mm2 and EIV  mm2.        US 2/2019     No DVT     Deep reflux   R femoral + popliteal vein reflux   L cfv + femoral + popliteal vein reflux       R GSV 9.6 mm - reflux   R LSV 6.6 mm -reflux     L GSV 5.8 mm + reflux 645 msec at CFV/GSV junction   L LSV 4.7 mm - reflux   Large  connecting GSV-PT with reflux        Chronic deep vein thrombosis (DVT) of popliteal vein of left lower extremity 2019    HIV infection, asymptomatic 2019    Chronic kidney disease, stage 3 2019    Statin intolerance 10/03/2017    Coronary artery disease involving native coronary artery of native heart without angina pectoris 2017    PAD (peripheral artery disease) 2017    Hypertension 2017    Hyperlipidemia 2017    Glaucoma 2015           Right Arm BP - Sittin/70  Left Arm BP - Sittin/70        LABS    Lipid panel  Lab Results   Component Value Date    CHOL 185 2018    CHOL 224 (H) 2018    CHOL 238 (H) 10/17/2017     Lab Results   Component Value Date    HDL 43 2018    HDL 60 2018    HDL 67 10/17/2017     Lab Results   Component Value Date    LDLCALC 125.4 2018    LDLCALC 144.2 2018    LDLCALC 160.2 (H) 10/17/2017     Lab Results   Component Value Date    TRIG 83 2018    TRIG 99 2018    TRIG 54 10/17/2017     Lab Results   Component Value Date    CHOLHDL 23.2 2018    CHOLHDL 26.8 2018    CHOLHDL 28.2 10/17/2017            Review of Systems   Constitution: Negative for diaphoresis, night sweats, weight gain and weight loss.   HENT: Negative for congestion.    Eyes: Negative for blurred vision, discharge and double vision.   Cardiovascular: Positive for leg swelling. Negative for chest pain, claudication, cyanosis, dyspnea on exertion, irregular heartbeat, near-syncope, orthopnea, palpitations, paroxysmal nocturnal dyspnea and syncope.   Respiratory: Negative for cough, shortness of breath and wheezing.    Endocrine: Negative for cold intolerance, heat intolerance and polyphagia.   Hematologic/Lymphatic: Negative for adenopathy and bleeding problem. Does not bruise/bleed easily.   Skin: Negative for dry skin and nail changes.   Musculoskeletal: Negative for arthritis, back pain, falls, joint pain, myalgias and neck pain.    Gastrointestinal: Negative for bloating, abdominal pain, change in bowel habit and constipation.   Genitourinary: Negative for bladder incontinence, dysuria, flank pain, genital sores and missed menses.   Neurological: Negative for aphonia, brief paralysis, difficulty with concentration, dizziness and weakness.   Psychiatric/Behavioral: Negative for altered mental status and memory loss. The patient does not have insomnia.    Allergic/Immunologic: Negative for environmental allergies.       Objective:   Physical Exam   Constitutional: He is oriented to person, place, and time. He appears well-developed and well-nourished. He is not intubated.   HENT:   Head: Normocephalic and atraumatic.   Right Ear: External ear normal.   Left Ear: External ear normal.   Mouth/Throat: Oropharynx is clear and moist.   Eyes: Pupils are equal, round, and reactive to light. Conjunctivae and EOM are normal. Right eye exhibits no discharge. Left eye exhibits no discharge. No scleral icterus.   Neck: Normal range of motion. Neck supple. Normal carotid pulses, no hepatojugular reflux and no JVD present. Carotid bruit is not present. No tracheal deviation present. No thyromegaly present.   Cardiovascular: Normal rate, regular rhythm, S1 normal and S2 normal.  No extrasystoles are present. PMI is not displaced. Exam reveals no gallop, no S3, no distant heart sounds, no friction rub and no midsystolic click.   No murmur heard.  Pulses:       Carotid pulses are 2+ on the right side, and 2+ on the left side.       Radial pulses are 2+ on the right side, and 2+ on the left side.        Femoral pulses are 2+ on the right side, and 2+ on the left side.       Popliteal pulses are 2+ on the right side, and 2+ on the left side.        Dorsalis pedis pulses are 1+ on the right side, and 1+ on the left side.        Posterior tibial pulses are 1+ on the right side, and 1+ on the left side.   Pulmonary/Chest: Effort normal and breath sounds normal. No  accessory muscle usage or stridor. No apnea, no tachypnea and no bradypnea. He is not intubated. No respiratory distress. He has no decreased breath sounds. He has no wheezes. He has no rales. He exhibits no tenderness and no bony tenderness.   Abdominal: He exhibits no distension, no pulsatile liver, no abdominal bruit, no ascites, no pulsatile midline mass and no mass. There is no tenderness. There is no rebound and no guarding.   Musculoskeletal: Normal range of motion. He exhibits no edema or tenderness.   Lymphadenopathy:     He has no cervical adenopathy.       2 + L leg and 1+ R leg edema         Neurological: He is alert and oriented to person, place, and time. He has normal reflexes. No cranial nerve deficit. Coordination normal.   Skin: Skin is warm. No rash noted. No erythema. No pallor.     R shin-old scars with healed ulcers    Psychiatric: He has a normal mood and affect. His behavior is normal. Judgment and thought content normal.       Assessment:     1. Venous insufficiency of both lower extremities    2. Essential hypertension    3. Mixed hyperlipidemia    4. Coronary artery disease involving native coronary artery of native heart without angina pectoris    5. PAD (peripheral artery disease)        Plan:         EVLT of L GSV for refractory reflux complicated with edema             Continue with DOAC for L POP DVT   It appears chronic not previously seen   Edema has been present prior to the DVT seen in 4/2018         Compression stockings-20-30 mmHg  Elevate legs when resting  Exercise           Medical therapy for CAD and PAD   Aspirin   Plavix   Repatha   Calcium channel blocker for angina        Continue with current medical plan and lifestyle changes.  Return sooner for concerns or questions. If symptoms persist go to the ED  I have reviewed all pertinent data on this patient     I have reviewed the patient's medical history in detail and updated the computerized patient record.    Orders  Placed This Encounter   Procedures    Case Request-Cath Lab: Ablation, Vein, Peripheral, Percutaneous, Endovenous, Using Laser     Standing Status:   Standing     Number of Occurrences:   1     Order Specific Question:   CPT Code:     Answer:   HI ENDOVENOUS LASER, 1ST VEIN [79955]     Order Specific Question:   Medical Necessity:     Answer:   Medically Urgent [101]       Follow up as scheduled. Return sooner for concerns or questions        He expressed verbal understanding and agreed with the plan      Greater than 50% of the visit of 45 minutes was spent counseling, educating, and coordinating the care of the patient.          Patient's Medications   New Prescriptions    No medications on file   Previous Medications    ALIROCUMAB (PRALUENT PEN) 150 MG/ML PNIJ    Inject 1 mL (150 mg total) into the skin every 14 (fourteen) days.    ALLOPURINOL (ZYLOPRIM) 100 MG TABLET    Take 1 tablet (100 mg total) by mouth once daily.    BRIMONIDINE 0.1% (ALPHAGAN P) 0.1 % DROP    Place 1 drop into both eyes 2 (two) times daily.     CARVEDILOL (COREG) 6.25 MG TABLET    Take 1 tablet (6.25 mg total) by mouth 2 (two) times daily.    CHOLECALCIFEROL, VITAMIN D3, (VITAMIN D3 ORAL)    Take 50,000 Units by mouth once a week.     CLOPIDOGREL (PLAVIX) 75 MG TABLET    TAKE ONE TABLET BY MOUTH ONCE DAILY    DARUNAVIR-ROBERT-EMTRI-TENOF ALA (SYMTUZA) [phone] MG TAB    Take 1 tablet by mouth.    EFAVIRENZ (SUSTIVA) 600 MG TAB    Take 600 mg by mouth every evening.    FUROSEMIDE (LASIX) 80 MG TABLET    Take 1 tablet (80 mg total) by mouth daily as needed (for leg swelling).    LATANOPROST 0.005 % OPHTHALMIC SOLUTION    1 drop every evening.    NIFEDIPINE (PROCARDIA-XL) 30 MG (OSM) 24 HR TABLET    Take 1 tablet (30 mg total) by mouth once daily.    XARELTO 20 MG TAB    TAKE 1 TABLET BY MOUTH ONCE DAILY WITH DINNER OR EVENING MEAL   Modified Medications    No medications on file   Discontinued Medications    No medications on file

## 2019-06-26 NOTE — PATIENT INSTRUCTIONS
Heart Disease Education    The heart beats 60 to 100 times per minute, 24 hours a day. This equals almost 1000,000 times a day. It pumps blood with oxygen and nutrients to the tissues and organs of the body. But the heart is a muscle and needs its own supply of blood. Blood flow to the heart is supplied by the coronary arteries. Coronary artery disease (atherosclerosis) is a result of cholesterol, saturated fat, and calcium deposits (plaques) that build up inside the walls. This causes inflammation within the coronary arteries. These plaques narrow the artery and reduce blood flow to the heart muscle. The reduction in blood flow to the heart muscle decreases oxygen supply to the heart. If the narrowing is significant enough, the oxygen supply to one or more regions of the heart can be temporarily or permanently shut down. This can cause chest pain, and possibly death of heart tissue (heart attack).  Types of chest pain  Angina is the name for pain in the heart muscle. Angina is a warning sign of serious heart disease. When untreated it can lead to a heart attack, also known as acute myocardial infarction, or AMI. Angina occurs when there is not enough blood and oxygen flowing to the heart for the amount of work it is doing. This most often happens during physical exertion, when the heart is working hardest. It is usually relieved by rest or nitroglycerin. Angina may also occur after a large meal when extra blood is sent to the digestive organs and less goes to the heart. In the case of advanced or unstable heart disease, angina can occur at rest or awaken you from sleep. Angina usually lasts from a few minutes up to 20 minutes or more. When treated early, the effects of angina can be reversed without permanent damage to the heart. Angina is a serious condition and needs to be evaluated by a medical professional immediately.  There are two types of angina -- stable and unstable:  · Stable angina usually occurs  with a predictable level of activity. Being stable, its character, severity, and occurrence do not change much over time. It usually starts with activity, and resolves with rest or taking your medicine as instructed by your doctor. The symptoms usually do not last long.  · Unstable angina changes or gets worse over time. It is different from whatever you are used to. It may feel different or worse, begin without cause, occur with exercise or exertion, wake you up from sleep, and last longer. It may not respond in the same way as it does when you take your usual medicines for an attack. This type of angina can be a warning sign of an impending heart attack.     A heart attack is usually the result of a blood clot that suddenly forms in a coronary artery that has been narrowed with plaque. When this occurs, blood flow may be cut off to a part of the heart muscle, causing the cells to die. This weakens the pumping action of the heart, which affects the delivery of blood to all the other organs in the body including the brain. This damage is not reversible. However, early treatment can limit the amount of damage.  The pain you feel with angina and a heart attack may have a similar quality. However, it is usually different in intensity and duration. Here are some typical descriptions of a heart attack:  · It is most often experienced as a squeezing, crushing, pressure-like sensation in the center of the chest.  · It is sometimes described as something heavy sitting on my chest.  · It may feel more like a bad case of indigestion.  · The pain may spread from the chest to the arm, shoulder, throat or jaw.  · Sometimes the pain is not felt in the chest at all, but only in the arm, shoulder, throat or jaw.  · There may also be nausea, vomiting, dizziness or light-headedness, sweating and trouble breathing.  · Palpitations, or your heart beating rapidly  · A new, irregular heart beat  · Unexplained weakness  You may not be  "able to tell the difference between "bad" angina and a heart attack at home. Seek help if your symptoms are different than usual. Do not be in denial or just try to "tough it out."  Call 911  This is the fastest and safest way to get to the emergency department. The paramedics can also start treatment on the way to the hospital, saving valuable time for your heart.  · If the angina gets worse, if it continues, or if it stops and returns, call 911 immediately. Do not delay. You may be having a heart attack.  · After you call 911, take a second tablet or spray unless instructed otherwise. When repeating doses, sit down if possible, because it can make you feel lightheaded or dizzy. Wait another 5 minutes. If the angina still does not go away, take a third tablet or spray. Do not take more than 3 tablets or sprays within 15 minutes. Stay on the phone with 911 for further instruction.  · Your healthcare provider may give you slightly different instructions than those above. If so, follow them carefully.  Do not wait until symptoms become severe to call 911.  Other reasons to call 911 include:  · Trouble breathing  · Feeling lightheaded, faint, or dizzy  · Rapid heart beat  · Slower than usual heart rate compared to your normal  · Angina with weakness, dizziness, fainting, heavy sweating, nausea, or vomiting  · Extreme drowsiness, confusion  · Weakness of an arm or leg or one side of the face  · Difficulty with speech or vision  When to seek medical care  Remember, the signs and symptoms of a heart attack are not always like they are on TV. Sometimes they are not so obvious. You may only feel weak, or just not right. If it is not clear or if you have any doubt, call for advice.  · Seek help if there is a change in the type of pain, if it feels different, or if your symptoms are mild.  · Do not drive yourself. Have someone else drive you. If no one can drive, call 911.  · Do not delay. Fast diagnosis and treatment can " "prevent or limit the amount of heart damage during a heart attack.  · Do not go to your doctor's office or a clinic as they may not be able to provide all the testing and treatment required for this condition.  · If your doctor has given you medicine to take when symptoms occur, take them but don't delay getting help trying to locate medicines.  What happens in the emergency department  The emergency department is connected to your local emergency medical system (EMS) through 911. That's why during a cardiac emergency, calling 911 is the fastest way to get help. The goal of the emergency department is to rapidly screen, evaluate, and treat people.  Once you are there, an electrocardiogram (ECG or heart tracing) will be done. Blood samples may be taken to look for the presence of heart enzymes that leak from damaged heart cells and show if a heart attack is occurring. You will often be evaluated by a heart specialist (cardiologist) who decides the best course of action. In the case of severe angina or early heart attack, and depending on the circumstances, powerful "clot busting" medicines can be used to dissolve blood clots in the coronary artery. In other cases, you may be taken to a cardiac catheterization lab. Here, a tiny balloon-tipped catheter is advanced through blood vessels to the heart. There the balloon is inflated pushing open the blood vessel restoring blood flow.  Risk factors for heart disease  Risk factors for heart disease are a combination of genetic and lifestyle. Many risk factors work by either directly or indirectly damaging the blood vessels of the heart, or by increasing the risk of forming blood or cholesterol clots, which then clog up and block the arteries.     Examples of physical lifestyle risk factors:  · Cigarette smoking  · High blood pressure  · High blood cholesterol  · Use of stimulant drugs such as cocaine, crack, and amphetamines  · Eating a high-fat, high-cholesterol " meal  · Diabetes   · Obesity which increases risk for diabetes and high blood pressure  · Lack of regular physical activity     Examples of emotional lifestyle factors:  · Chronic high stress levels release stress hormones. These raise blood pressure and cholesterol level and makes blood clot more easily.  · Held-in anger, hostile or cynical attitude  · Social and emotional isolation, lack of intimacy  · Loss of relationship  · Depression  Other factors that increase the risk of heart attack that you cannot control :  · Age. The older you get beyond 40, the greater is your risk of significant coronary artery disease.  · Gender. More men than women get heart disease; but once past menopause, women who are not taking estrogen replacement have the same risk as men for a heart attack.  · Family history. If your mother, father, brother or sister has coronary artery disease, your risk of having it is higher than a person your age without this family history.  What can you do to decrease your risk  To reduce your risk of heart disease:  · Get regular checkups with your doctor.  · Take your medicines for blood pressure, cholesterol or diabetes as directed.  · Watch your diet. Eat a heart healthy diet choosing fresh foods, less salt, cholesterol, and fat  · Stop smoking. Get help if needed.  · Get regular exercise.  · Manage stress.  · Carry a list of medicines and doses in your wallet.  Date Last Reviewed: 12/30/2015  © 5513-2888 [employer], [employer]. [address] [place] [address], [place] [address]. All rights reserved. This information is not intended as a substitute for professional medical care. Always follow your healthcare professional's instructions.        Understanding Chronic Venous Insufficiency  Problems with the veins in the legs may lead to chronic venous insufficiency (CVI). CVI means that there is a long-term problem with the veins not being able to pump blood back to your heart. When this happens, blood stays  in the legs and causes swelling and aching.   Two problems that may lead to chronic venous insufficiency are:  · Damaged valves. Valves keep blood flowing from the legs through the blood vessels and back to the heart. When the valves are damaged, blood does not flow as well.   · Deep vein thrombosis (DVT). Blood clots may form in the deep veins of the legs. This may cause pain, redness, and swelling in the legs. It may also block the flow of blood back to the heart. Seek immediate medical care if you have these symptoms.  · A blood clot in the leg can also break off and travel to the lungs. This is called pulmonary embolism (PE). In the lungs, the clot can cut off the flow of blood. This may cause chest pain, trouble breathing, sweating, a fast heartbeat, coughing (may cough up blood), and fainting. It is a medical emergency and may cause death. Call 911 if you have these symptoms.  · Healthcare providers call the two conditions, DVT and PE, venous thromboembolism (VTE).  CVI cant be cured, but you can control leg swelling to reduce the likelihood of ulcers (sores).  Recognizing the symptoms  Be aware of the following:  · If you stand or sit with your feet down for long periods, your legs may ache or feel heavy.  · Swollen ankles are possibly the most common symptom of CVI.  · As swelling increases, the skin over your ankles may show red spots or a brownish tinge. The skin may feel leathery or scaly, and may start to itch.  · If swelling is not controlled, an ulcer (open wound) may form.  What you can do  Reduce your risk of developing ulcers by doing the following:  · Increase blood flow back to your heart by elevating your legs, exercising daily, and wearing elastic stockings.  · Boost blood flow in your legs by losing excess weight.  · If you must stand or sit in one place for a period of time, keep your blood moving by wiggling your toes, shifting your body position, and rising up on the balls of your  feet.    Date Last Reviewed: 5/1/2016  © 1952-6146 [employer], [employer]. [address] [place] [address], [place] [address]. All rights reserved. This information is not intended as a substitute for professional medical care. Always follow your healthcare professional's instructions.

## 2019-06-27 ENCOUNTER — OFFICE VISIT (OUTPATIENT)
Dept: INTERNAL MEDICINE | Facility: CLINIC | Age: 79
End: 2019-06-27
Payer: MEDICARE

## 2019-06-27 ENCOUNTER — HOSPITAL ENCOUNTER (OUTPATIENT)
Dept: RADIOLOGY | Facility: HOSPITAL | Age: 79
Discharge: HOME | End: 2019-06-27
Attending: INTERNAL MEDICINE
Payer: MEDICARE

## 2019-06-27 DIAGNOSIS — S20.212A CONTUSION OF RIB ON LEFT SIDE, INITIAL ENCOUNTER: ICD-10-CM

## 2019-06-27 DIAGNOSIS — N18.30 CHRONIC KIDNEY DISEASE, STAGE 3: ICD-10-CM

## 2019-06-27 DIAGNOSIS — I10 ESSENTIAL HYPERTENSION: Primary | ICD-10-CM

## 2019-06-27 LAB
CD3+CD4+ CELLS # BLD: 1357 CELLS/UL (ref 300–1400)
CD3+CD4+ CELLS NFR BLD: 40.3 % (ref 28–57)
RPR SER QL: NORMAL

## 2019-06-27 PROCEDURE — 1101F PR PT FALLS ASSESS DOC 0-1 FALLS W/OUT INJ PAST YR: ICD-10-PCS | Mod: CPTII,S$GLB,, | Performed by: INTERNAL MEDICINE

## 2019-06-27 PROCEDURE — 99999 PR PBB SHADOW E&M-EST. PATIENT-LVL III: ICD-10-PCS | Mod: PBBFAC,,, | Performed by: INTERNAL MEDICINE

## 2019-06-27 PROCEDURE — 99214 OFFICE O/P EST MOD 30 MIN: CPT | Mod: S$GLB,,, | Performed by: INTERNAL MEDICINE

## 2019-06-27 PROCEDURE — 71100 X-RAY EXAM RIBS UNI 2 VIEWS: CPT | Mod: TC,FY,PO,LT

## 2019-06-27 PROCEDURE — 99999 PR PBB SHADOW E&M-EST. PATIENT-LVL III: CPT | Mod: PBBFAC,,, | Performed by: INTERNAL MEDICINE

## 2019-06-27 PROCEDURE — 3078F PR MOST RECENT DIASTOLIC BLOOD PRESSURE < 80 MM HG: ICD-10-PCS | Mod: CPTII,S$GLB,, | Performed by: INTERNAL MEDICINE

## 2019-06-27 PROCEDURE — 71100 X-RAY EXAM RIBS UNI 2 VIEWS: CPT | Mod: 26,LT,, | Performed by: RADIOLOGY

## 2019-06-27 PROCEDURE — 3078F DIAST BP <80 MM HG: CPT | Mod: CPTII,S$GLB,, | Performed by: INTERNAL MEDICINE

## 2019-06-27 PROCEDURE — 3074F PR MOST RECENT SYSTOLIC BLOOD PRESSURE < 130 MM HG: ICD-10-PCS | Mod: CPTII,S$GLB,, | Performed by: INTERNAL MEDICINE

## 2019-06-27 PROCEDURE — 71100 XR RIBS 2 VIEW LEFT: ICD-10-PCS | Mod: 26,LT,, | Performed by: RADIOLOGY

## 2019-06-27 PROCEDURE — 1101F PT FALLS ASSESS-DOCD LE1/YR: CPT | Mod: CPTII,S$GLB,, | Performed by: INTERNAL MEDICINE

## 2019-06-27 PROCEDURE — 99214 PR OFFICE/OUTPT VISIT, EST, LEVL IV, 30-39 MIN: ICD-10-PCS | Mod: S$GLB,,, | Performed by: INTERNAL MEDICINE

## 2019-06-27 PROCEDURE — 3074F SYST BP LT 130 MM HG: CPT | Mod: CPTII,S$GLB,, | Performed by: INTERNAL MEDICINE

## 2019-06-27 RX ORDER — CARVEDILOL 3.12 MG/1
3.12 TABLET ORAL 2 TIMES DAILY
Qty: 180 TABLET | Refills: 1 | Status: SHIPPED | OUTPATIENT
Start: 2019-06-27 | End: 2020-01-28 | Stop reason: SDUPTHER

## 2019-06-27 NOTE — PATIENT INSTRUCTIONS
Recommendations for today    Measure blood pressure before every schedule dosage of carvedilol.  If the top number is less than 100 blood pressure is too low to take the schedule dosage of carvedilol and you should skip the dosage of carvedilol.  If blood pressure is above 100 we would recommend that you proceed with the schedule dosage of carvedilol    Continue the other blood pressure medications furosemide/Lasix and nifedipine for blood pressure management.

## 2019-06-28 LAB
HIV UQ DATE RECEIVED: NORMAL
HIV UQ DATE REPORTED: NORMAL
HIV1 RNA # SERPL NAA+PROBE: <40 COPIES/ML
HIV1 RNA SERPL NAA+PROBE-LOG#: <1.6 LOG (10) COPIES/ML
HIV1 RNA SERPL QL NAA+PROBE: NOT DETECTED

## 2019-06-30 VITALS
DIASTOLIC BLOOD PRESSURE: 58 MMHG | HEIGHT: 73 IN | SYSTOLIC BLOOD PRESSURE: 122 MMHG | HEART RATE: 68 BPM | BODY MASS INDEX: 26.85 KG/M2 | WEIGHT: 202.63 LBS | OXYGEN SATURATION: 99 %

## 2019-06-30 NOTE — PROGRESS NOTES
Portions of this note are generated with voice recognition software. Typographical errors may exist.     SUBJECTIVE:    This is a/an 78 y.o. male here for primary care visit for  Chief Complaint   Patient presents with    Follow-up     2 mos    Hypertension     Since coming back from vacation blood pressure has risen.  However the patient continues to check blood pressure twice daily and blood pressure is within target.  No longer having problems with hypotension which was going on while he was on vacation with his hypertension regimen at 6.25 mg of carvedilol twice daily.  Since then he has been taking 3.125 mg twice daily.  Patient confirms other elements of his blood pressure regimen.    Patient states that he had a mechanical fall while he was on vacation that resulted in falling forward and bruising his chest.  States that he has had some significant pain since the accident 2 weeks ago but gradually the pain is improving.  There was pain with deep breathing which has mostly gone away.    Patient had problems with coordinating his next visit with Nephrology.  He understands that it is important for him to reschedule Nephrology appointment.    Medications Reviewed and Updated    Past medical, family, and social histories were reviewed and updated.    Review of Systems negative unless otherwise noted in history of present illness-  ROS    General ROS: negative  Psychological ROS: negative  ENT ROS: negative  Endocrine ROS: Negative  Allergy and Immunology ROS: negative  Cardiovascular ROS: negative  Pulmonary ROS: Negative  Gastrointestinal ROS: negative  Genito-Urinary ROS: negative  Musculoskeletal ROS: negative      Allergic:    Review of patient's allergies indicates:   Allergen Reactions    Lipitor [atorvastatin] Other (See Comments)     myalgia       OBJECTIVE:  BP: (!) 122/58 Pulse: 68    Wt Readings from Last 3 Encounters:   06/27/19 91.9 kg (202 lb 9.6 oz)   05/29/19 93.4 kg (206 lb)   05/01/19 93 kg  (205 lb)    Body mass index is 26.73 kg/m².  Previous Blood Pressure Readings :   BP Readings from Last 3 Encounters:   06/27/19 (!) 122/58   06/26/19 120/70   05/29/19 (!) 142/74       Physical Exam    GEN: No apparent distress  HEENT: sclera non-icteric, conjunctiva clear  CV: no peripheral edema regular rate and rhythm.  PULM: breathing non-labored  ABD: non, protuberant abdomen.  PSYCH: appropriate affect  MSK: able to rise from chair without assistance  SKIN: normal skin turgor    Pertinent Labs Reviewed       ASSESSMENT/PLAN:    Essential hypertension.Condition stable.  Counseling given today on self-care measures. Plan to monitor clinically. Continue current medical plan.   -     carvedilol (COREG) 3.125 MG tablet; Take 1 tablet (3.125 mg total) by mouth 2 (two) times daily.  Dispense: 180 tablet; Refill: 1    Contusion of rib on left side, initial encounter.Condition improving.  Counseling on self-care measures today.  Continue with current plan in addition to recommendations written on After Visit Summary.   -     X-Ray Ribs 2 View Left; Future; Expected date: 06/27/2019    Chronic kidney disease, stage 3.Condition not optimally controlled. Detailed counseling on self care measures. Plan to monitor clinically in addition to plan below or as listed on After Visit Summary.    - renal to discuss biopsy       Future Appointments   Date Time Provider Department Center   8/14/2019 10:40 AM Edward Wilkerson MD [facility] Janina Wilkerson  6/30/2019  3:59 PM

## 2019-07-12 RX ORDER — CLOPIDOGREL BISULFATE 75 MG/1
TABLET ORAL
Qty: 30 TABLET | Refills: 11 | Status: SHIPPED | OUTPATIENT
Start: 2019-07-12 | End: 2019-10-25 | Stop reason: DRUGHIGH

## 2019-07-17 ENCOUNTER — HOSPITAL ENCOUNTER (OUTPATIENT)
Facility: HOSPITAL | Age: 79
Discharge: HOME | End: 2019-07-17
Attending: INTERNAL MEDICINE | Admitting: INTERNAL MEDICINE
Payer: MEDICARE

## 2019-07-17 VITALS
HEART RATE: 58 BPM | TEMPERATURE: 99 F | BODY MASS INDEX: 27.36 KG/M2 | DIASTOLIC BLOOD PRESSURE: 69 MMHG | OXYGEN SATURATION: 100 % | HEIGHT: 72 IN | SYSTOLIC BLOOD PRESSURE: 158 MMHG | WEIGHT: 202 LBS | RESPIRATION RATE: 15 BRPM

## 2019-07-17 DIAGNOSIS — I87.2 VENOUS INSUFFICIENCY OF BOTH LOWER EXTREMITIES: ICD-10-CM

## 2019-07-17 PROCEDURE — 36478 ENDOVENOUS LASER 1ST VEIN: CPT | Mod: LT,,, | Performed by: INTERNAL MEDICINE

## 2019-07-17 PROCEDURE — 36478 ENDOVENOUS LASER 1ST VEIN: CPT | Mod: LT | Performed by: INTERNAL MEDICINE

## 2019-07-17 PROCEDURE — C1894 INTRO/SHEATH, NON-LASER: HCPCS | Performed by: INTERNAL MEDICINE

## 2019-07-17 PROCEDURE — 27201423 OPTIME MED/SURG SUP & DEVICES STERILE SUPPLY: Performed by: INTERNAL MEDICINE

## 2019-07-17 PROCEDURE — 36478 PR ENDOVENOUS LASER, 1ST VEIN: ICD-10-PCS | Mod: LT,,, | Performed by: INTERNAL MEDICINE

## 2019-07-17 PROCEDURE — 25000003 PHARM REV CODE 250: Performed by: INTERNAL MEDICINE

## 2019-07-17 RX ORDER — DIPHENHYDRAMINE HCL 25 MG
50 CAPSULE ORAL ONCE
Status: DISCONTINUED | OUTPATIENT
Start: 2019-07-17 | End: 2019-07-17 | Stop reason: HOSPADM

## 2019-07-17 RX ORDER — HYDROCODONE BITARTRATE AND ACETAMINOPHEN 7.5; 325 MG/1; MG/1
TABLET ORAL
Status: DISCONTINUED | OUTPATIENT
Start: 2019-07-17 | End: 2019-07-17 | Stop reason: HOSPADM

## 2019-07-17 RX ORDER — DIAZEPAM 2 MG/1
TABLET ORAL
Status: DISCONTINUED | OUTPATIENT
Start: 2019-07-17 | End: 2019-07-17 | Stop reason: HOSPADM

## 2019-07-17 RX ORDER — DIAZEPAM 5 MG/1
10 TABLET ORAL ONCE
Status: DISCONTINUED | OUTPATIENT
Start: 2019-07-17 | End: 2019-07-17 | Stop reason: HOSPADM

## 2019-07-17 RX ORDER — HYDROCODONE BITARTRATE AND ACETAMINOPHEN 7.5; 325 MG/1; MG/1
1 TABLET ORAL ONCE
Status: DISCONTINUED | OUTPATIENT
Start: 2019-07-17 | End: 2019-07-17 | Stop reason: HOSPADM

## 2019-07-17 NOTE — PROGRESS NOTES
Pt wheeled to front of building via w/c by RHIANNA Nick. Pt aaox4, vss. Pt left w kathya Azar on private vehicle. SERA.

## 2019-07-17 NOTE — DISCHARGE INSTRUCTIONS
Discharge Instructions:    Do not drive a car, operate heavy equipment, care for a young child, etc for the next 12-24 hours.   Avoid drinking alcohol for 24 hours.  Do not make any important decisions for 24 hours.    Drink fluids to keep hydrated. Resume your usual diet as tolerated.     Walk every hour for 5 minutes while awake.    Remove dressing tomorrow then may shower with warm soapy water. Do not scrub site. Pat dry.   May apply bandaid for 2 days.  No tub baths.  Do not submerge wound in water for 3 days.   REPLACE stocking after bath.  Wear stocking for 72 hours without removing. Then for 21 days while awake.    Call MD for any unrelieved pain, excessive nausea or vomiting, redness around site, bleeding, or pus or foul smelling drainage, or any other questions or concerns.    Go to the ER for any difficulty breathing or chest pain.      If site swells or bleeds, hold direct pressure to area for 10 full minutes.   If site continues to bleed, continue to hold pressure to site and have someone bring you to the ER.      Follow any additional instructions given to you by MD.      Call MD to schedule a follow up appointment, or follow up as instructed.

## 2019-07-17 NOTE — INTERVAL H&P NOTE
The patient has been examined and the H&P has been reviewed:      Anesthesia/Surgery risks, benefits and alternative options discussed and understood by patient/family.          Active Hospital Problems    Diagnosis  POA    Venous insufficiency of both lower extremities [I87.2]  Yes     Priority: Low         5/11/2018:        S/p venogram + IV guided intervention for refractory bilateral edema.    Right EIV 55.6% stenosis + CSA 69 mm2 (Normal  mm2).    Right EIV PTV with 20 x 70 mm Wallstent with post PTV  mm2.    Left CIV 75.1% stenosis + CSA 65.9 mm2 (Normal  mm2).    Left CIV + EIV PTV with 22 x 70 + 16 x 60 mm Wallstents.    Post stenotic CIV  mm2 and EIV  mm2.        US 2/2019     No DVT     Deep reflux   R femoral + popliteal vein reflux   L cfv + femoral + popliteal vein reflux       R GSV 9.6 mm - reflux   R LSV 6.6 mm -reflux     L GSV 5.8 mm + reflux 645 msec at CFV/GSV junction   L LSV 4.7 mm - reflux   Large  connecting GSV-PT with reflux       Chronic deep vein thrombosis (DVT) of popliteal vein of left lower extremity [I82.532]  Yes    HIV infection, asymptomatic [Z21]  Yes    Chronic kidney disease, stage 3 [N18.3]  Yes    PAD (peripheral artery disease) [I73.9]  Yes    Hyperlipidemia [E78.5]  Yes      Resolved Hospital Problems   No resolved problems to display.

## 2019-07-17 NOTE — PROGRESS NOTES
EVLT procedure completed. Pt tolerated well. Pt aaox4, vss, neuro intact. Denies pain NV or any other discomfort. Pt ready for discharged as ordered per md Minaya. Discharge instructions given, pt verbalized full understanding and denies questions. Paper copy provided. Awaiting grandson-Doe arrival.

## 2019-07-22 NOTE — DISCHARGE SUMMARY
[facility]  Cardiology  Discharge Summary      Patient Name: Osorio Boggs  MRN: [id number]  Admission Date: 7/17/2019  Hospital Length of Stay: 0 days  Discharge Date and Time: 7/17/2019  Attending Physician: No att. providers found  Discharging Provider: Manuel Metzger MD  Primary Care Physician: Edward Wilkerson MD    HPI:     · S/p left GSV EVLT           Surveillance ultrasound   Compression stockings        Procedure(s) (LRB):  Ablation, Vein, Peripheral, Percutaneous, Endovenous, Using Laser (N/A)     Indwelling Lines/Drains at time of discharge:  Lines/Drains/Airways          None          Hospital Course see above   Consults:     Significant Diagnostic Studies:     Labs, ecg, and angiogram     Pending Diagnostic Studies:     None          Final Active Diagnoses:    Diagnosis Date Noted POA    Venous insufficiency of both lower extremities [I87.2] 04/25/2018 Yes    Chronic deep vein thrombosis (DVT) of popliteal vein of left lower extremity [I82.532] 06/05/2019 Yes    HIV infection, asymptomatic [Z21] 02/05/2019 Yes    Chronic kidney disease, stage 3 [N18.3] 02/05/2019 Yes    PAD (peripheral artery disease) [I73.9] 02/09/2017 Yes    Hyperlipidemia [E78.5] 02/08/2017 Yes      Problems Resolved During this Admission:       Discharged Condition:     Stable     DC home      Follow up with PCP      Follow up with Dr. Metzger or primary cardiologist as scheduled      Cardiac diet      Resume normal activities in 3 days    Follow Up:    Patient Instructions:     See above       Medications:      Discharge Medication List as of 7/17/2019  8:57 AM      CONTINUE these medications which have NOT CHANGED    Details   alirocumab (PRALUENT PEN) 150 mg/mL PnIj Inject 1 mL (150 mg total) into the skin every 14 (fourteen) days., Starting Mon 2/18/2019, Normal      allopurinol (ZYLOPRIM) 100 MG tablet Take 1 tablet (100 mg total) by mouth once daily., Starting Wed 5/29/2019, Normal       brimonidine 0.1% (ALPHAGAN P) 0.1 % Drop Place 1 drop into both eyes 2 (two) times daily. , Until Discontinued, Historical Med      carvedilol (COREG) 3.125 MG tablet Take 1 tablet (3.125 mg total) by mouth 2 (two) times daily., Starting Thu 6/27/2019, Until Tue 12/24/2019, Normal      CHOLECALCIFEROL, VITAMIN D3, (VITAMIN D3 ORAL) Take 50,000 Units by mouth once a week. , Until Discontinued, Historical Med      clopidogrel (PLAVIX) 75 mg tablet TAKE 1 TABLET BY MOUTH ONCE DAILY, Normal      darunavir-gustavo-emtri-tenof ala (SYMTUZA) [phone] mg Tab Take 1 tablet by mouth., Historical Med      efavirenz (SUSTIVA) 600 mg Tab Take 600 mg by mouth every evening., Until Discontinued, Historical Med      furosemide (LASIX) 80 MG tablet Take 1 tablet (80 mg total) by mouth daily as needed (for leg swelling)., Starting Wed 5/1/2019, Until Thu 4/30/2020, Normal      latanoprost 0.005 % ophthalmic solution 1 drop every evening., Until Discontinued, Historical Med      NIFEdipine (PROCARDIA-XL) 30 MG (OSM) 24 hr tablet Take 1 tablet (30 mg total) by mouth once daily., Starting Wed 5/29/2019, Until Thu 5/28/2020, Normal      XARELTO 20 mg Tab TAKE 1 TABLET BY MOUTH ONCE DAILY WITH DINNER OR EVENING MEAL, Normal                Time spent on the discharge of patient: 45 minutes    Manuel Metzger MD  Cardiology  [facility]

## 2019-07-24 ENCOUNTER — TELEPHONE (OUTPATIENT)
Dept: CARDIOLOGY | Facility: CLINIC | Age: 79
End: 2019-07-24

## 2019-07-24 ENCOUNTER — HOSPITAL ENCOUNTER (OUTPATIENT)
Dept: RADIOLOGY | Facility: HOSPITAL | Age: 79
Discharge: HOME | End: 2019-07-24
Attending: INTERNAL MEDICINE
Payer: MEDICARE

## 2019-07-24 DIAGNOSIS — I87.2 VENOUS INSUFFICIENCY OF LEFT LOWER EXTREMITY: Primary | ICD-10-CM

## 2019-07-24 DIAGNOSIS — I87.2 VENOUS INSUFFICIENCY OF LEFT LOWER EXTREMITY: ICD-10-CM

## 2019-07-24 PROCEDURE — 93970 EXTREMITY STUDY: CPT | Mod: TC,PO,LT

## 2019-08-02 ENCOUNTER — TELEPHONE (OUTPATIENT)
Dept: PHARMACY | Facility: CLINIC | Age: 79
End: 2019-08-02

## 2019-08-06 ENCOUNTER — TELEPHONE (OUTPATIENT)
Dept: PHARMACY | Facility: CLINIC | Age: 79
End: 2019-08-06

## 2019-08-14 ENCOUNTER — OFFICE VISIT (OUTPATIENT)
Dept: INTERNAL MEDICINE | Facility: CLINIC | Age: 79
End: 2019-08-14
Payer: MEDICARE

## 2019-08-14 VITALS
BODY MASS INDEX: 26.5 KG/M2 | OXYGEN SATURATION: 98 % | DIASTOLIC BLOOD PRESSURE: 64 MMHG | HEART RATE: 74 BPM | HEIGHT: 73 IN | WEIGHT: 199.94 LBS | SYSTOLIC BLOOD PRESSURE: 120 MMHG

## 2019-08-14 DIAGNOSIS — I10 ESSENTIAL HYPERTENSION: Primary | ICD-10-CM

## 2019-08-14 DIAGNOSIS — N18.30 CHRONIC KIDNEY DISEASE, STAGE 3: ICD-10-CM

## 2019-08-14 DIAGNOSIS — I73.9 PAD (PERIPHERAL ARTERY DISEASE): ICD-10-CM

## 2019-08-14 PROCEDURE — 3074F PR MOST RECENT SYSTOLIC BLOOD PRESSURE < 130 MM HG: ICD-10-PCS | Mod: CPTII,S$GLB,, | Performed by: INTERNAL MEDICINE

## 2019-08-14 PROCEDURE — 99214 PR OFFICE/OUTPT VISIT, EST, LEVL IV, 30-39 MIN: ICD-10-PCS | Mod: S$GLB,,, | Performed by: INTERNAL MEDICINE

## 2019-08-14 PROCEDURE — 3078F DIAST BP <80 MM HG: CPT | Mod: CPTII,S$GLB,, | Performed by: INTERNAL MEDICINE

## 2019-08-14 PROCEDURE — 99499 RISK ADDL DX/OHS AUDIT: ICD-10-PCS | Mod: S$GLB,,, | Performed by: INTERNAL MEDICINE

## 2019-08-14 PROCEDURE — 99499 UNLISTED E&M SERVICE: CPT | Mod: S$GLB,,, | Performed by: INTERNAL MEDICINE

## 2019-08-14 PROCEDURE — 3078F PR MOST RECENT DIASTOLIC BLOOD PRESSURE < 80 MM HG: ICD-10-PCS | Mod: CPTII,S$GLB,, | Performed by: INTERNAL MEDICINE

## 2019-08-14 PROCEDURE — 99999 PR PBB SHADOW E&M-EST. PATIENT-LVL IV: ICD-10-PCS | Mod: PBBFAC,,, | Performed by: INTERNAL MEDICINE

## 2019-08-14 PROCEDURE — 3074F SYST BP LT 130 MM HG: CPT | Mod: CPTII,S$GLB,, | Performed by: INTERNAL MEDICINE

## 2019-08-14 PROCEDURE — 99999 PR PBB SHADOW E&M-EST. PATIENT-LVL IV: CPT | Mod: PBBFAC,,, | Performed by: INTERNAL MEDICINE

## 2019-08-14 PROCEDURE — 1101F PT FALLS ASSESS-DOCD LE1/YR: CPT | Mod: CPTII,S$GLB,, | Performed by: INTERNAL MEDICINE

## 2019-08-14 PROCEDURE — 1101F PR PT FALLS ASSESS DOC 0-1 FALLS W/OUT INJ PAST YR: ICD-10-PCS | Mod: CPTII,S$GLB,, | Performed by: INTERNAL MEDICINE

## 2019-08-14 PROCEDURE — 99214 OFFICE O/P EST MOD 30 MIN: CPT | Mod: S$GLB,,, | Performed by: INTERNAL MEDICINE
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
## 2019-08-21 ENCOUNTER — TELEPHONE (OUTPATIENT)
Dept: INTERNAL MEDICINE | Facility: CLINIC | Age: 79
End: 2019-08-21
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
## 2019-09-06 ENCOUNTER — TELEPHONE (OUTPATIENT)
Dept: PHARMACY | Facility: CLINIC | Age: 79
End: 2019-09-06

## 2019-09-09 ENCOUNTER — TELEPHONE (OUTPATIENT)
Dept: PHARMACY | Facility: CLINIC | Age: 79
End: 2019-09-09

## 2019-09-23 ENCOUNTER — TELEPHONE (OUTPATIENT)
Dept: INTERNAL MEDICINE | Facility: CLINIC | Age: 79
End: 2019-09-23

## 2019-09-24 NOTE — TELEPHONE ENCOUNTER
"----- Message from Catherine Chang sent at 9/23/2019  8:28 AM CDT -----  Contact: SELF/[phone]  "He has a physical problem an would like to speak to the doctor."  "

## 2019-09-24 NOTE — TELEPHONE ENCOUNTER
Spoke with patient and adjustment to medication made already for c/o dizziness for 5 days. Informed pt will send message to Dr. Wilkerson. Patient voices understanding.

## 2019-10-02 ENCOUNTER — OFFICE VISIT (OUTPATIENT)
Dept: INTERNAL MEDICINE | Facility: CLINIC | Age: 79
End: 2019-10-02
Payer: MEDICARE

## 2019-10-02 VITALS
DIASTOLIC BLOOD PRESSURE: 78 MMHG | WEIGHT: 198 LBS | OXYGEN SATURATION: 98 % | HEART RATE: 56 BPM | BODY MASS INDEX: 26.24 KG/M2 | SYSTOLIC BLOOD PRESSURE: 118 MMHG | HEIGHT: 73 IN | TEMPERATURE: 98 F

## 2019-10-02 DIAGNOSIS — R60.0 PERIPHERAL EDEMA: ICD-10-CM

## 2019-10-02 DIAGNOSIS — N18.30 CHRONIC KIDNEY DISEASE, STAGE 3: ICD-10-CM

## 2019-10-02 DIAGNOSIS — R42 ORTHOSTATIC DIZZINESS: Primary | ICD-10-CM

## 2019-10-02 PROCEDURE — 99213 PR OFFICE/OUTPT VISIT, EST, LEVL III, 20-29 MIN: ICD-10-PCS | Mod: S$GLB,,, | Performed by: INTERNAL MEDICINE

## 2019-10-02 PROCEDURE — 3074F SYST BP LT 130 MM HG: CPT | Mod: CPTII,S$GLB,, | Performed by: INTERNAL MEDICINE

## 2019-10-02 PROCEDURE — 99499 RISK ADDL DX/OHS AUDIT: ICD-10-PCS | Mod: S$GLB,,, | Performed by: INTERNAL MEDICINE

## 2019-10-02 PROCEDURE — 99499 UNLISTED E&M SERVICE: CPT | Mod: S$GLB,,, | Performed by: INTERNAL MEDICINE

## 2019-10-02 PROCEDURE — 99213 OFFICE O/P EST LOW 20 MIN: CPT | Mod: S$GLB,,, | Performed by: INTERNAL MEDICINE

## 2019-10-02 PROCEDURE — 3074F PR MOST RECENT SYSTOLIC BLOOD PRESSURE < 130 MM HG: ICD-10-PCS | Mod: CPTII,S$GLB,, | Performed by: INTERNAL MEDICINE

## 2019-10-02 PROCEDURE — 1101F PR PT FALLS ASSESS DOC 0-1 FALLS W/OUT INJ PAST YR: ICD-10-PCS | Mod: CPTII,S$GLB,, | Performed by: INTERNAL MEDICINE

## 2019-10-02 PROCEDURE — 3078F DIAST BP <80 MM HG: CPT | Mod: CPTII,S$GLB,, | Performed by: INTERNAL MEDICINE

## 2019-10-02 PROCEDURE — 99999 PR PBB SHADOW E&M-EST. PATIENT-LVL IV: ICD-10-PCS | Mod: PBBFAC,,, | Performed by: INTERNAL MEDICINE

## 2019-10-02 PROCEDURE — 1101F PT FALLS ASSESS-DOCD LE1/YR: CPT | Mod: CPTII,S$GLB,, | Performed by: INTERNAL MEDICINE

## 2019-10-02 PROCEDURE — 3078F PR MOST RECENT DIASTOLIC BLOOD PRESSURE < 80 MM HG: ICD-10-PCS | Mod: CPTII,S$GLB,, | Performed by: INTERNAL MEDICINE

## 2019-10-02 PROCEDURE — 99999 PR PBB SHADOW E&M-EST. PATIENT-LVL IV: CPT | Mod: PBBFAC,,, | Performed by: INTERNAL MEDICINE

## 2019-10-02 NOTE — PROGRESS NOTES
Portions of this note are generated with voice recognition software. Typographical errors may exist.     SUBJECTIVE:    This is a/an 79 y.o. male here for primary care visit for  Chief Complaint   Patient presents with    Dizziness     Patient states that he has been having for maybe the past 7-10 days more orthostatic dizziness than usual.  The patient has actually stopped his nifedipine since calling Nephrology and then later our office regarding some episodes of systolic blood pressure in the 90s.  Patient states that since stopping nifedipine but continuing carvedilol the orthostatic dizziness has remain.  Patient states that he has been taking about 2-3 doses of for most some I would most weeks to help with accumulation of fluid in the legs.  He feels that there might be some association between the days that he takes furosemide on consecutive days and the dizziness.    Patient comes in with detailed blood pressure numbers reviewed which show that his average blood pressure is in the low 110 to 120 range.    Patient states that he has been advised to change his a relative to apixaban.  He is to review this with cardiovascular      Medications Reviewed and Updated    Past medical, family, and social histories were reviewed and updated.    Review of Systems negative unless otherwise noted in history of present illness-  ROS    General ROS: negative  Psychological ROS: negative  ENT ROS: negative  Endocrine ROS: Negative  Allergy and Immunology ROS: negative  Cardiovascular ROS: negative  Pulmonary ROS: Negative  Gastrointestinal ROS: negative  Genito-Urinary ROS: negative  Musculoskeletal ROS: negative      Allergic:    Review of patient's allergies indicates:   Allergen Reactions    Lipitor [atorvastatin] Other (See Comments)     myalgia       OBJECTIVE:  BP: 118/78 Pulse: (!) 56 Temp: 98.4 °F (36.9 °C)  Wt Readings from Last 3 Encounters:   10/02/19 89.8 kg (197 lb 15.6 oz)   10/02/19 90.7 kg (200 lb)    08/14/19 90.7 kg (199 lb 15.3 oz)    Body mass index is 26.12 kg/m².  Previous Blood Pressure Readings :   BP Readings from Last 3 Encounters:   10/02/19 118/78   10/02/19 124/66   08/14/19 120/64       Physical Exam    GEN: No apparent distress  HEENT: sclera non-icteric, conjunctiva clear  CV:  1+ peripheral edema left greater than right ankle regular rate and rhythm  PULM: breathing non-labored  ABD: non, protuberant abdomen.  PSYCH: appropriate affect  MSK: able to rise from chair without assistance  SKIN: normal skin turgor    Pertinent Labs Reviewed       ASSESSMENT/PLAN:    Orthostatic dizziness.Condition not optimally controlled. Detailed counseling on self care measures. Plan to monitor clinically in addition to plan below or as listed on After Visit Summary.    - recommend when dosing furosemide subsequent doses  by 48 hr    Chronic kidney disease, stage 3.Condition stable.  Counseling given today on self-care measures. Plan to monitor clinically. Continue current medical plan.     Peripheral edema.Condition stable.  Counseling given today on self-care measures. Plan to monitor clinically. Continue current medical plan.         Future Appointments   Date Time Provider Department Center   11/14/2019  9:40 AM Edward Wilkerson MD [facility]   12/4/2019  9:45 AM Myah Perry MD [facility]   12/9/2019 10:40 AM Edward Wilkerson MD [facility]       Edward Wilkerson  10/2/2019  12:20 PM

## 2019-10-02 NOTE — PATIENT INSTRUCTIONS
Recommendations for today    Dizziness is due to dehydration likely from the effects of furosemide.  To minimize these symptoms whenever you have medical necessity to take furosemide try taking the next dose 48 hr later to give you time to adjust to the 1st dosage.    Blood pressure target is to have the systolic blood pressure/top number consistently less than 130.  If you start to notice that the blood pressure over the span of 7-10 days is more consistently in the 130s you need to consider going back on nifedipine.  Should this become necessary you should contact my clinic to confirm that your back on the medicine

## 2019-10-04 ENCOUNTER — TELEPHONE (OUTPATIENT)
Dept: CARDIOLOGY | Facility: HOSPITAL | Age: 79
End: 2019-10-04

## 2019-10-04 NOTE — TELEPHONE ENCOUNTER
----- Message from Edward Wilkerson MD sent at 10/3/2019  7:30 AM CDT -----  Patient is curious if he can come off plavix. Tells me it was started ever since Rachel put in a stent in 2017. From what you can see, does he from a cardiac/vascular standpoint need to be on plavix lifelong?

## 2019-10-04 NOTE — TELEPHONE ENCOUNTER
He has extensive disease with multiple stents      We will continue with plavix for now      Thanks      ZN

## 2019-10-04 NOTE — TELEPHONE ENCOUNTER
----- Message from Myah Perry MD sent at 10/2/2019  9:20 AM CDT -----  GFR 34 and slowly worsening  Will switch from xarelto to eliquis   Is 5 bid is a good dose or ok to go to 2.5?

## 2019-10-07 ENCOUNTER — TELEPHONE (OUTPATIENT)
Dept: PHARMACY | Facility: CLINIC | Age: 79
End: 2019-10-07

## 2019-10-07 NOTE — TELEPHONE ENCOUNTER
Refill call regarding Praluent at [facility]. Will prepare for shipment on 10/ 8 to arrive 10/9 with pt consent. Copay 0.00. Patient has not started any new medications, no missed doses.Patient did have questions for the pharmacist regarding itching after injecting and was transferred for consultation.  & address verified.  ~10/10 next injection

## 2019-10-10 ENCOUNTER — TELEPHONE (OUTPATIENT)
Dept: INTERNAL MEDICINE | Facility: CLINIC | Age: 79
End: 2019-10-10

## 2019-10-10 DIAGNOSIS — I10 ESSENTIAL HYPERTENSION: ICD-10-CM

## 2019-10-10 RX ORDER — NIFEDIPINE 30 MG/1
30 TABLET, EXTENDED RELEASE ORAL DAILY
Qty: 90 TABLET | Refills: 1 | Status: SHIPPED | OUTPATIENT
Start: 2019-10-10 | End: 2019-10-15 | Stop reason: SDUPTHER

## 2019-10-10 NOTE — TELEPHONE ENCOUNTER
Spoke with patient regarding b/p log readings, also told patient  would give him a call regarding medication.

## 2019-10-10 NOTE — TELEPHONE ENCOUNTER
----- Message from Gemma Ontiveros sent at 10/10/2019  3:44 PM CDT -----  Contact: [phone]/self  Patient requesting to speak with you regarding a previous office visit. Please advise.

## 2019-10-10 NOTE — PROGRESS NOTES
Patient states that despite being off of furosemide now for more than a week he still feels off balance and like his equilibrium is off.  Denies paralysis affecting half of the body of or his face.  States that he is concerned that he is starting to have frequent systolic blood pressure in the 130s 140s in sometimes 150s.  He has been off of nifedipine this entire time and only taking carvedilol.  He is instructed to resume nifedipine withholding parameters as discussed.  He will discuss the possibility of anti-retroviral side effects in light of progressive decline in GFR within infectious diseases practice

## 2019-10-15 ENCOUNTER — TELEPHONE (OUTPATIENT)
Dept: INTERNAL MEDICINE | Facility: CLINIC | Age: 79
End: 2019-10-15

## 2019-10-15 DIAGNOSIS — I10 ESSENTIAL HYPERTENSION: ICD-10-CM

## 2019-10-15 RX ORDER — NIFEDIPINE 30 MG/1
30 TABLET, EXTENDED RELEASE ORAL DAILY
Qty: 90 TABLET | Refills: 1 | Status: SHIPPED | OUTPATIENT
Start: 2019-10-15 | End: 2019-10-15

## 2019-10-15 RX ORDER — NIFEDIPINE 30 MG/1
30 TABLET, EXTENDED RELEASE ORAL DAILY
Qty: 90 TABLET | Refills: 1 | Status: SHIPPED | OUTPATIENT
Start: 2019-10-15 | End: 2019-11-14

## 2019-10-15 NOTE — TELEPHONE ENCOUNTER
----- Message from Dennys Nicole sent at 10/14/2019  2:54 PM CDT -----  [facility]  [phone]  Katharina need clarification on directions for Rx NIFEdipine (PROCARDIA-XL) 30 MG ([facility]) 24 hr tablet.

## 2019-10-25 ENCOUNTER — TELEPHONE (OUTPATIENT)
Dept: CARDIOLOGY | Facility: CLINIC | Age: 79
End: 2019-10-25

## 2019-10-25 RX ORDER — CLOPIDOGREL BISULFATE 75 MG/1
75 TABLET ORAL DAILY
Qty: 90 TABLET | Refills: 3 | Status: SHIPPED | OUTPATIENT
Start: 2019-10-25 | End: 2019-10-25 | Stop reason: SDUPTHER

## 2019-10-25 RX ORDER — CLOPIDOGREL BISULFATE 75 MG/1
75 TABLET ORAL DAILY
Qty: 90 TABLET | Refills: 1 | Status: SHIPPED | OUTPATIENT
Start: 2019-10-25 | End: 2019-11-14

## 2019-10-25 NOTE — TELEPHONE ENCOUNTER
Called and left VM      He can restart plavix if he would like to continue      It was stopped because there was a concern about excessive bleeding on aspirin, plavix, and eliquis      Thanks      ZN

## 2019-10-25 NOTE — TELEPHONE ENCOUNTER
----- Message from Darreldolores Aguirre sent at 10/25/2019  9:01 AM CDT -----  Contact: patient  Patient called to speak with your office in regard to his medication below and why it was discontinued as he was told this is a life-sustaining medicine that he needs to take every day.    He said 2 pharmacy techs and 1 pharmacist at [facility] who told him it was discontinued by an [facility] doctor and there is nothing they can do to dispense it to him without the doctor's consent.    I have included all the doctors and the NP on this correspondence as the chart has conflicting information.    Please call the patient at [phone] to discuss today as he has 3 pills left and is apprehensive about not having any more.    clopidogrel (PLAVIX) 75 mg tablet

## 2019-10-25 NOTE — TELEPHONE ENCOUNTER
Returned pt phone call     Pt stated that he will not  the Plavix, was just waiting on clarification as to whether or not it had been discontinued previously     Will continue taking ASA and eliquis

## 2019-10-25 NOTE — TELEPHONE ENCOUNTER
----- Message from Darreldolores Aguirre sent at 10/25/2019  9:01 AM CDT -----  Contact: patient  Patient called to speak with your office in regard to his medication below and why it was discontinued as he was told this is a life-sustaining medicine that he needs to take every day.    He said 2 pharmacy techs and 1 pharmacist at [facility] who told him it was discontinued by an [facility] doctor and there is nothing they can do to dispense it to him without the doctor's consent.    I have included all the doctors and the NP on this correspondence as the chart has conflicting information.    Please call the patient at [phone] to discuss today as he has 3 pills left and is apprehensive about not having any more.    clopidogrel (PLAVIX) 75 mg tablet

## 2019-10-28 RX ORDER — CLOPIDOGREL BISULFATE 75 MG/1
TABLET ORAL
Qty: 30 TABLET | Refills: 11 | Status: SHIPPED | OUTPATIENT
Start: 2019-10-28 | End: 2019-11-14

## 2019-11-01 ENCOUNTER — TELEPHONE (OUTPATIENT)
Dept: PHARMACY | Facility: CLINIC | Age: 79
End: 2019-11-01

## 2019-11-14 ENCOUNTER — OFFICE VISIT (OUTPATIENT)
Dept: INTERNAL MEDICINE | Facility: CLINIC | Age: 79
End: 2019-11-14
Payer: MEDICARE

## 2019-11-14 VITALS
WEIGHT: 205.25 LBS | HEIGHT: 73 IN | DIASTOLIC BLOOD PRESSURE: 58 MMHG | SYSTOLIC BLOOD PRESSURE: 122 MMHG | BODY MASS INDEX: 27.2 KG/M2 | HEART RATE: 63 BPM | OXYGEN SATURATION: 98 %

## 2019-11-14 DIAGNOSIS — R60.0 PERIPHERAL EDEMA: ICD-10-CM

## 2019-11-14 DIAGNOSIS — N18.30 CHRONIC KIDNEY DISEASE, STAGE 3: Primary | ICD-10-CM

## 2019-11-14 DIAGNOSIS — Z21 HIV INFECTION, ASYMPTOMATIC: ICD-10-CM

## 2019-11-14 DIAGNOSIS — I10 ESSENTIAL HYPERTENSION: ICD-10-CM

## 2019-11-14 PROCEDURE — 99214 OFFICE O/P EST MOD 30 MIN: CPT | Mod: S$GLB,,, | Performed by: INTERNAL MEDICINE

## 2019-11-14 PROCEDURE — 3078F DIAST BP <80 MM HG: CPT | Mod: CPTII,S$GLB,, | Performed by: INTERNAL MEDICINE

## 2019-11-14 PROCEDURE — 99999 PR PBB SHADOW E&M-EST. PATIENT-LVL III: ICD-10-PCS | Mod: PBBFAC,,, | Performed by: INTERNAL MEDICINE

## 2019-11-14 PROCEDURE — 3074F SYST BP LT 130 MM HG: CPT | Mod: CPTII,S$GLB,, | Performed by: INTERNAL MEDICINE

## 2019-11-14 PROCEDURE — 1101F PT FALLS ASSESS-DOCD LE1/YR: CPT | Mod: CPTII,S$GLB,, | Performed by: INTERNAL MEDICINE

## 2019-11-14 PROCEDURE — 3074F PR MOST RECENT SYSTOLIC BLOOD PRESSURE < 130 MM HG: ICD-10-PCS | Mod: CPTII,S$GLB,, | Performed by: INTERNAL MEDICINE

## 2019-11-14 PROCEDURE — 99999 PR PBB SHADOW E&M-EST. PATIENT-LVL III: CPT | Mod: PBBFAC,,, | Performed by: INTERNAL MEDICINE

## 2019-11-14 PROCEDURE — 99214 PR OFFICE/OUTPT VISIT, EST, LEVL IV, 30-39 MIN: ICD-10-PCS | Mod: S$GLB,,, | Performed by: INTERNAL MEDICINE

## 2019-11-14 PROCEDURE — 1101F PR PT FALLS ASSESS DOC 0-1 FALLS W/OUT INJ PAST YR: ICD-10-PCS | Mod: CPTII,S$GLB,, | Performed by: INTERNAL MEDICINE

## 2019-11-14 PROCEDURE — 3078F PR MOST RECENT DIASTOLIC BLOOD PRESSURE < 80 MM HG: ICD-10-PCS | Mod: CPTII,S$GLB,, | Performed by: INTERNAL MEDICINE

## 2019-11-14 RX ORDER — NIFEDIPINE 30 MG/1
30 TABLET, EXTENDED RELEASE ORAL DAILY
Qty: 90 TABLET | Refills: 1 | Status: SHIPPED | OUTPATIENT
Start: 2019-11-14 | End: 2019-11-25

## 2019-11-14 NOTE — PROGRESS NOTES
Portions of this note are generated with voice recognition software. Typographical errors may exist.     SUBJECTIVE:    This is a/an 79 y.o. male here for primary care visit for  Chief Complaint   Patient presents with    Hypertension     3 mo     Patient states that he has been using his blood pressure medication but has had some confusion about how to hold nifedipine based on home blood pressure readings.  He does not come prepared with home blood pressure numbers med continues to check blood pressure at home.  States he has not had any unusual blood pressure readings with systolic less than 100.  Dizzy spells have been less often but he has been slacking on furosemide and has lately had significant persistent fluid retention in the lower extremities.    Patient remains confused about what to do with anti-platelet medication.  He stopped clopidogrel but recalls the initial message from Cardiology indicating that lifelong dual anti-platelet therapy would be recommended.  Patient recalls that he did have an isolated episode of hematuria without dysuria.  Resolved on its own.  He thinks that this might have contributed to the decision to reverse the recommendation and wants clarity continues with Eliquis    Patient has succeeded in changing anti-retroviral medication and consultation with his infectious disease is provider.  He is due for repeat viral load evaluation soon    Medications Reviewed and Updated    Past medical, family, and social histories were reviewed and updated.    Review of Systems negative unless otherwise noted in history of present illness-  ROS    General ROS: negative  Psychological ROS: negative  ENT ROS: negative  Endocrine ROS: Negative  Allergy and Immunology ROS: negative  Cardiovascular ROS: negative  Pulmonary ROS: Negative  Gastrointestinal ROS: negative  Genito-Urinary ROS: negative  Musculoskeletal ROS: negative      Allergic:    Review of patient's allergies indicates:   Allergen  Reactions    Lipitor [atorvastatin] Other (See Comments)     myalgia       OBJECTIVE:  BP: (!) 122/58 Pulse: 63    Wt Readings from Last 3 Encounters:   11/14/19 93.1 kg (205 lb 4 oz)   10/02/19 89.8 kg (197 lb 15.6 oz)   10/02/19 90.7 kg (200 lb)    Body mass index is 27.08 kg/m².  Previous Blood Pressure Readings :   BP Readings from Last 3 Encounters:   11/14/19 (!) 122/58   10/02/19 118/78   10/02/19 124/66       Physical Exam    GEN: No apparent distress  HEENT: sclera non-icteric, conjunctiva clear  CV: no peripheral edema  PULM: breathing non-labored  ABD: Obese, protuberant abdomen.  PSYCH: appropriate affect  MSK: able to rise from chair without assistance  SKIN: normal skin turgor    Pertinent Labs Reviewed       ASSESSMENT/PLAN:    Chronic kidney disease, stage 3.Condition stable.  Counseling given today on self-care measures. Plan to monitor clinically. Continue current medical plan.    - follow renal     Essential hypertension.Condition stable.  Counseling given today on self-care measures. Plan to monitor clinically. Continue current medical plan.   -     NIFEdipine (PROCARDIA-XL) 30 MG (OSM) 24 hr tablet; Take 1 tablet (30 mg total) by mouth once daily. (jazzmine presion) Take 1/2 tablet for systolic BP < 130. HOLD for BP < 100  Dispense: 90 tablet; Refill: 1  - Educate again on holding parameters    Peripheral edema.Condition not optimally controlled. Detailed counseling on self care measures. Plan to monitor clinically in addition to plan below or as listed on After Visit Summary.    - lasix PRN    HIV infection, asymptomatic.Condition stable.  Counseling given today on self-care measures. Plan to monitor clinically. Continue current medical plan.    - off Efavirenz     Future Appointments   Date Time Provider Department Center   12/4/2019  9:45 AM Myah Perry MD [facility]   2/12/2020  8:20 AM Edward Wilkerson MD [facility]       Edward Wilkerson  11/14/2019  12:39 PM

## 2019-11-19 ENCOUNTER — TELEPHONE (OUTPATIENT)
Dept: INTERNAL MEDICINE | Facility: CLINIC | Age: 79
End: 2019-11-19

## 2019-11-19 NOTE — TELEPHONE ENCOUNTER
Spoke with patient and he reports on last visit was told Dr. Wilkerson would reach out to Dr. Minaya and if no one called him by Monday to call office. Informed will send message to Dr. Wilkerson and call back with recommendations. Patient voices understanding.

## 2019-11-19 NOTE — TELEPHONE ENCOUNTER
----- Message from Nga Pinedo sent at 11/19/2019 10:44 AM CST -----  Contact: JOSE R SONG [[id number]]  [phone]    Patient has not heard anything about what he spoke with the doctor about last week.

## 2019-11-22 ENCOUNTER — TELEPHONE (OUTPATIENT)
Dept: CARDIOLOGY | Facility: CLINIC | Age: 79
End: 2019-11-22

## 2019-11-22 ENCOUNTER — TELEPHONE (OUTPATIENT)
Dept: INTERNAL MEDICINE | Facility: CLINIC | Age: 79
End: 2019-11-22

## 2019-11-22 ENCOUNTER — TELEPHONE (OUTPATIENT)
Dept: CARDIOLOGY | Facility: HOSPITAL | Age: 79
End: 2019-11-22

## 2019-11-22 NOTE — TELEPHONE ENCOUNTER
----- Message from Madelyn Guzman sent at 11/22/2019  4:00 PM CST -----  Contact: [phone]/ Elin with [facility]  Elin with [facility] would like to speak with you in regards to patients medication NIFEdipine (PROCARDIA-XL) 30 MG (OSM) 24 hr tablet. She says the medication cannot be cut in half. Please call.

## 2019-11-22 NOTE — TELEPHONE ENCOUNTER
----- Message from Edward Wilkerson MD sent at 11/22/2019 12:37 PM CST -----  Contact: patient  Thought I sent a message to your office on Friday. Might have forgotten. The patient is anxious to get some clarity on DAPT. When I first spoke to you about risk/benefits for lifelong DAPT you recommended if not high risk for major bleed to continue lifelong despite his anticoagulation. There was then a phone call, perhaps after he called cardiology office with a story of self limited hematuria that resolved on it's own, to stop his plavix. I think more than anything he wants reassurance about the overall plan for DAPT but this is contradictory info. Can a member of your office please reach out?     ----- Message -----  From: Teo Aguirre  Sent: 10/25/2019   9:01 AM CST  To: Ashleigh CALIXTO Staff, #    Patient called to speak with your office in regard to his medication below and why it was discontinued as he was told this is a life-sustaining medicine that he needs to take every day.    He said 2 pharmacy techs and 1 pharmacist at [facility] who told him it was discontinued by an [facility] doctor and there is nothing they can do to dispense it to him without the doctor's consent.    I have included all the doctors and the NP on this correspondence as the chart has conflicting information.    Please call the patient at [phone] to discuss today as he has 3 pills left and is apprehensive about not having any more.    clopidogrel (PLAVIX) 75 mg tablet

## 2019-11-22 NOTE — TELEPHONE ENCOUNTER
Spoke to pt. Informed him  Stop taking Plavix  As instructed by his cardiologist and  . Pt. Voices understanding .

## 2019-11-22 NOTE — TELEPHONE ENCOUNTER
----- Message from Edward Wilkerson MD sent at 11/22/2019  1:53 PM CST -----  Call patient and let him know I spoke with cardiology. They have reconsidered plavix and think it's not a strong recommendation to continue. Dr. Wilkerson thinks that if it isn't a strong recommendation that it's best to stop for now but continue aspirin and eliqius    Monitor Summary  SR [id number]  .12/.1/.42

## 2019-11-22 NOTE — TELEPHONE ENCOUNTER
I spoke with him personally and clarified         I asked him at the end of our conversation if he had any questions        He said no and verbalized understanding         Aspirin for MAPT        DOAC for DVT         No plavix         If he wants to continue taking plavix that is fine           Thanks        ZN

## 2019-11-22 NOTE — TELEPHONE ENCOUNTER
----- Message from Edward Wilkerson MD sent at 11/22/2019 12:37 PM CST -----  Contact: patient  Thought I sent a message to your office on Friday. Might have forgotten. The patient is anxious to get some clarity on DAPT. When I first spoke to you about risk/benefits for lifelong DAPT you recommended if not high risk for major bleed to continue lifelong despite his anticoagulation. There was then a phone call, perhaps after he called cardiology office with a story of self limited hematuria that resolved on it's own, to stop his plavix. I think more than anything he wants reassurance about the overall plan for DAPT but this is contradictory info. Can a member of your office please reach out?     ----- Message -----  From: Teo Aguirre  Sent: 10/25/2019   9:01 AM CST  To: Ashleigh CALIXTO Staff, #    Patient called to speak with your office in regard to his medication below and why it was discontinued as he was told this is a life-sustaining medicine that he needs to take every day.    He said 2 pharmacy techs and 1 pharmacist at [facility] who told him it was discontinued by an [facility] doctor and there is nothing they can do to dispense it to him without the doctor's consent.    I have included all the doctors and the NP on this correspondence as the chart has conflicting information.    Please call the patient at [phone] to discuss today as he has 3 pills left and is apprehensive about not having any more.    clopidogrel (PLAVIX) 75 mg tablet

## 2019-11-25 ENCOUNTER — TELEPHONE (OUTPATIENT)
Dept: INTERNAL MEDICINE | Facility: CLINIC | Age: 79
End: 2019-11-25

## 2019-11-25 DIAGNOSIS — I10 ESSENTIAL HYPERTENSION: ICD-10-CM

## 2019-11-25 RX ORDER — NIFEDIPINE 30 MG/1
30 TABLET, EXTENDED RELEASE ORAL DAILY
Qty: 90 TABLET | Refills: 1 | Status: SHIPPED | OUTPATIENT
Start: 2019-11-25 | End: 2020-02-19 | Stop reason: SDUPTHER

## 2019-11-26 NOTE — TELEPHONE ENCOUNTER
----- Message from Edward Wilkerson MD sent at 11/22/2019  1:51 PM CST -----  Please call patient pharmacy to discontinue plavix.     [phone]

## 2019-12-02 ENCOUNTER — TELEPHONE (OUTPATIENT)
Dept: PHARMACY | Facility: CLINIC | Age: 79
End: 2019-12-02

## 2019-12-03 PROBLEM — R73.9 HYPERGLYCEMIA: Status: ACTIVE | Noted: 2019-12-03

## 2019-12-26 ENCOUNTER — TELEPHONE (OUTPATIENT)
Dept: CARDIOLOGY | Facility: CLINIC | Age: 79
End: 2019-12-26

## 2019-12-26 NOTE — TELEPHONE ENCOUNTER
"Patient stated he made an appointment to see his Pcp for his  "tingling ,  Funny  Feeling " on his feet.    He will let his Pcp advised him what to do.      NW                      ----- Message from Kristina Singleton sent at 12/26/2019 11:39 AM CST -----  Contact: [phone]-self  Patient is requesting a call back concerning pain in his feet.Please call      "

## 2019-12-27 ENCOUNTER — TELEPHONE (OUTPATIENT)
Dept: INTERNAL MEDICINE | Facility: CLINIC | Age: 79
End: 2019-12-27

## 2019-12-27 NOTE — PROGRESS NOTES
"Subjective:       Patient ID: Osorio Boggs is a 79 y.o. male.    Chief Complaint: Foot Swelling (painful)    Osorio is a 79 y.o. male who presents today for an  visit for burning painful feet. He has a PMHx of HTN, DLD, PAD, CKD, and venous insufficiency. He has been having foot pain that started 5 days ago. Redness of the foot started 3 days ago. He has no history of gout that he is aware of. No fevers or chills. Symptoms are on the left foot. He has a wound on the left foot (chronic). He had foot swelling, but this resolved. The foot is painful. No systemic symptoms. No other joints hurt. No trauma or falls that he is aware of.     Although he denies gout, he is on allopurinol. Renal notes state that he had one gout attack about "10 years ago."    Review of Systems   Constitutional: Negative for chills and fever.   Respiratory: Negative for cough and shortness of breath.    Cardiovascular: Positive for leg swelling. Negative for chest pain.   Genitourinary: Negative for difficulty urinating and dysuria.   Musculoskeletal: Positive for gait problem and joint swelling. Negative for myalgias.   Skin: Positive for rash and wound.   Neurological: Negative for dizziness, light-headedness and headaches.         Objective:     Vitals:    12/28/19 0808   BP: 118/66   Pulse: 72   Temp: 98 °F (36.7 °C)        Physical Exam   Constitutional: He appears well-developed and well-nourished. No distress.   HENT:   Head: Normocephalic and atraumatic.   Eyes: Conjunctivae are normal.   Cardiovascular: Normal rate and regular rhythm.   Pulmonary/Chest: Effort normal and breath sounds normal.   Abdominal: Soft. There is no tenderness.   Musculoskeletal: He exhibits edema and tenderness.   See picture below  Left foot with sensation intact  There is TTP of the dorsal aspect of the foot.  It appears to be erythematous and warm to the touch  No exudate  There is TTP  Capillary refill appear to be normal    There is swelling BL with L " Mildly greater then right (chronic per patient)    There is no calf pain (left) to palpation.    Neurological: He is alert.   Skin: Skin is warm. He is not diaphoretic.   Psychiatric: He has a normal mood and affect. His behavior is normal.   Nursing note and vitals reviewed.              Assessment:       1. Foot pain, left    2. Cellulitis of left lower extremity    3. Acute idiopathic gout of left foot    4. CKD (chronic kidney disease) stage 3, GFR 30-59 ml/min        Plan:       Gout vs cellulitis  Need to avoid nsaid due to CKD  Would like to avoid prednisone  Colchicine for gout empirically  Doxy for cellulitis  Labs and XR today  If not better in 48-72 hours or systemic symptoms develop, please go to the ED.  Discussed all of this in detail with the patient.   Will forward note to renal and PCP per patient request.     Foot pain, left  -     CBC auto differential; Future; Expected date: 12/28/2019  -     Comprehensive metabolic panel; Future; Expected date: 12/28/2019  -     URIC ACID; Future; Expected date: 12/28/2019  -     X-Ray Foot Complete Left; Future; Expected date: 12/28/2019    Cellulitis of left lower extremity  -     Procalcitonin; Future; Expected date: 12/28/2019  -     CBC auto differential; Future; Expected date: 12/28/2019  -     Comprehensive metabolic panel; Future; Expected date: 12/28/2019  -     URIC ACID; Future; Expected date: 12/28/2019  -     X-Ray Foot Complete Left; Future; Expected date: 12/28/2019  -     doxycycline (VIBRA-TABS) 100 MG tablet; Take 1 tablet (100 mg total) by mouth 2 (two) times daily. for 10 days  Dispense: 20 tablet; Refill: 0    Acute idiopathic gout of left foot  -     CBC auto differential; Future; Expected date: 12/28/2019  -     Comprehensive metabolic panel; Future; Expected date: 12/28/2019  -     URIC ACID; Future; Expected date: 12/28/2019  -     X-Ray Foot Complete Left; Future; Expected date: 12/28/2019  -     colchicine (COLCRYS) 0.6 mg tablet; 1.2  mg at the first sign of flare, followed in 1 hour with a single dose of 0.6 mg. Follow with 0.6 mg once daily  Dispense: 10 tablet; Refill: 0    CKD (chronic kidney disease) stage 3, GFR 30-59 ml/min        Warning signs discussed, patient to call with any further issues or worsening of symptoms.

## 2019-12-27 NOTE — TELEPHONE ENCOUNTER
----- Message from Sabrina Salas sent at 12/27/2019  8:59 AM CST -----  Contact: [phone]/self  Patient called in requesting to speak with you. Patient prefers to speak with a nurse. Please advise.

## 2019-12-28 ENCOUNTER — OFFICE VISIT (OUTPATIENT)
Dept: FAMILY MEDICINE | Facility: CLINIC | Age: 79
End: 2019-12-28
Payer: MEDICARE

## 2019-12-28 ENCOUNTER — LAB VISIT (OUTPATIENT)
Dept: LAB | Facility: HOSPITAL | Age: 79
End: 2019-12-28
Attending: FAMILY MEDICINE
Payer: MEDICARE

## 2019-12-28 VITALS
BODY MASS INDEX: 26.88 KG/M2 | TEMPERATURE: 98 F | HEART RATE: 72 BPM | SYSTOLIC BLOOD PRESSURE: 118 MMHG | HEIGHT: 73 IN | DIASTOLIC BLOOD PRESSURE: 66 MMHG | OXYGEN SATURATION: 98 % | WEIGHT: 202.81 LBS

## 2019-12-28 DIAGNOSIS — M10.072 ACUTE IDIOPATHIC GOUT OF LEFT FOOT: ICD-10-CM

## 2019-12-28 DIAGNOSIS — L03.116 CELLULITIS OF LEFT LOWER EXTREMITY: ICD-10-CM

## 2019-12-28 DIAGNOSIS — M79.672 FOOT PAIN, LEFT: ICD-10-CM

## 2019-12-28 DIAGNOSIS — M79.672 FOOT PAIN, LEFT: Primary | ICD-10-CM

## 2019-12-28 DIAGNOSIS — I87.2 VENOUS INSUFFICIENCY OF BOTH LOWER EXTREMITIES: ICD-10-CM

## 2019-12-28 DIAGNOSIS — N18.30 CKD (CHRONIC KIDNEY DISEASE) STAGE 3, GFR 30-59 ML/MIN: ICD-10-CM

## 2019-12-28 LAB
ALBUMIN SERPL BCP-MCNC: 3.8 G/DL (ref 3.5–5.2)
ALP SERPL-CCNC: 89 U/L (ref 55–135)
ALT SERPL W/O P-5'-P-CCNC: 13 U/L (ref 10–44)
ANION GAP SERPL CALC-SCNC: 7 MMOL/L (ref 8–16)
AST SERPL-CCNC: 29 U/L (ref 10–40)
BASOPHILS # BLD AUTO: 0.04 K/UL (ref 0–0.2)
BASOPHILS NFR BLD: 0.3 % (ref 0–1.9)
BILIRUB SERPL-MCNC: 0.6 MG/DL (ref 0.1–1)
BUN SERPL-MCNC: 41 MG/DL (ref 8–23)
CALCIUM SERPL-MCNC: 9.3 MG/DL (ref 8.7–10.5)
CHLORIDE SERPL-SCNC: 109 MMOL/L (ref 95–110)
CO2 SERPL-SCNC: 21 MMOL/L (ref 23–29)
CREAT SERPL-MCNC: 2 MG/DL (ref 0.5–1.4)
DIFFERENTIAL METHOD: ABNORMAL
EOSINOPHIL # BLD AUTO: 0.5 K/UL (ref 0–0.5)
EOSINOPHIL NFR BLD: 4.4 % (ref 0–8)
ERYTHROCYTE [DISTWIDTH] IN BLOOD BY AUTOMATED COUNT: 13.7 % (ref 11.5–14.5)
EST. GFR  (AFRICAN AMERICAN): 35.1 ML/MIN/1.73 M^2
EST. GFR  (NON AFRICAN AMERICAN): 30.4 ML/MIN/1.73 M^2
GLUCOSE SERPL-MCNC: 122 MG/DL (ref 70–110)
HCT VFR BLD AUTO: 37.6 % (ref 40–54)
HGB BLD-MCNC: 11.6 G/DL (ref 14–18)
IMM GRANULOCYTES # BLD AUTO: 0.03 K/UL (ref 0–0.04)
IMM GRANULOCYTES NFR BLD AUTO: 0.3 % (ref 0–0.5)
LYMPHOCYTES # BLD AUTO: 3.4 K/UL (ref 1–4.8)
LYMPHOCYTES NFR BLD: 28.6 % (ref 18–48)
MCH RBC QN AUTO: 31.5 PG (ref 27–31)
MCHC RBC AUTO-ENTMCNC: 30.9 G/DL (ref 32–36)
MCV RBC AUTO: 102 FL (ref 82–98)
MONOCYTES # BLD AUTO: 0.8 K/UL (ref 0.3–1)
MONOCYTES NFR BLD: 7 % (ref 4–15)
NEUTROPHILS # BLD AUTO: 7 K/UL (ref 1.8–7.7)
NEUTROPHILS NFR BLD: 59.4 % (ref 38–73)
NRBC BLD-RTO: 0 /100 WBC
PLATELET # BLD AUTO: 206 K/UL (ref 150–350)
PMV BLD AUTO: 11.5 FL (ref 9.2–12.9)
POTASSIUM SERPL-SCNC: 5.2 MMOL/L (ref 3.5–5.1)
PROCALCITONIN SERPL IA-MCNC: 0.06 NG/ML
PROT SERPL-MCNC: 7.6 G/DL (ref 6–8.4)
RBC # BLD AUTO: 3.68 M/UL (ref 4.6–6.2)
SODIUM SERPL-SCNC: 137 MMOL/L (ref 136–145)
URATE SERPL-MCNC: 8.7 MG/DL (ref 3.4–7)
WBC # BLD AUTO: 11.72 K/UL (ref 3.9–12.7)

## 2019-12-28 PROCEDURE — 99999 PR PBB SHADOW E&M-EST. PATIENT-LVL IV: CPT | Mod: PBBFAC,,, | Performed by: FAMILY MEDICINE

## 2019-12-28 PROCEDURE — 99214 OFFICE O/P EST MOD 30 MIN: CPT | Mod: S$GLB,,, | Performed by: FAMILY MEDICINE

## 2019-12-28 PROCEDURE — 3078F PR MOST RECENT DIASTOLIC BLOOD PRESSURE < 80 MM HG: ICD-10-PCS | Mod: CPTII,S$GLB,, | Performed by: FAMILY MEDICINE

## 2019-12-28 PROCEDURE — 84550 ASSAY OF BLOOD/URIC ACID: CPT

## 2019-12-28 PROCEDURE — 3074F SYST BP LT 130 MM HG: CPT | Mod: CPTII,S$GLB,, | Performed by: FAMILY MEDICINE

## 2019-12-28 PROCEDURE — 99214 PR OFFICE/OUTPT VISIT, EST, LEVL IV, 30-39 MIN: ICD-10-PCS | Mod: S$GLB,,, | Performed by: FAMILY MEDICINE

## 2019-12-28 PROCEDURE — 1159F MED LIST DOCD IN RCRD: CPT | Mod: S$GLB,,, | Performed by: FAMILY MEDICINE

## 2019-12-28 PROCEDURE — 1100F PTFALLS ASSESS-DOCD GE2>/YR: CPT | Mod: CPTII,S$GLB,, | Performed by: FAMILY MEDICINE

## 2019-12-28 PROCEDURE — 36415 COLL VENOUS BLD VENIPUNCTURE: CPT | Mod: PO

## 2019-12-28 PROCEDURE — 3074F PR MOST RECENT SYSTOLIC BLOOD PRESSURE < 130 MM HG: ICD-10-PCS | Mod: CPTII,S$GLB,, | Performed by: FAMILY MEDICINE

## 2019-12-28 PROCEDURE — 1125F PR PAIN SEVERITY QUANTIFIED, PAIN PRESENT: ICD-10-PCS | Mod: S$GLB,,, | Performed by: FAMILY MEDICINE

## 2019-12-28 PROCEDURE — 3288F PR FALLS RISK ASSESSMENT DOCUMENTED: ICD-10-PCS | Mod: CPTII,S$GLB,, | Performed by: FAMILY MEDICINE

## 2019-12-28 PROCEDURE — 3288F FALL RISK ASSESSMENT DOCD: CPT | Mod: CPTII,S$GLB,, | Performed by: FAMILY MEDICINE

## 2019-12-28 PROCEDURE — 3078F DIAST BP <80 MM HG: CPT | Mod: CPTII,S$GLB,, | Performed by: FAMILY MEDICINE

## 2019-12-28 PROCEDURE — 80053 COMPREHEN METABOLIC PANEL: CPT

## 2019-12-28 PROCEDURE — 84145 PROCALCITONIN (PCT): CPT

## 2019-12-28 PROCEDURE — 1100F PR PT FALLS ASSESS DOC 2+ FALLS/FALL W/INJURY/YR: ICD-10-PCS | Mod: CPTII,S$GLB,, | Performed by: FAMILY MEDICINE

## 2019-12-28 PROCEDURE — 99999 PR PBB SHADOW E&M-EST. PATIENT-LVL IV: ICD-10-PCS | Mod: PBBFAC,,, | Performed by: FAMILY MEDICINE

## 2019-12-28 PROCEDURE — 1125F AMNT PAIN NOTED PAIN PRSNT: CPT | Mod: S$GLB,,, | Performed by: FAMILY MEDICINE

## 2019-12-28 PROCEDURE — 1159F PR MEDICATION LIST DOCUMENTED IN MEDICAL RECORD: ICD-10-PCS | Mod: S$GLB,,, | Performed by: FAMILY MEDICINE

## 2019-12-28 PROCEDURE — 85025 COMPLETE CBC W/AUTO DIFF WBC: CPT

## 2019-12-28 RX ORDER — COLCHICINE 0.6 MG/1
TABLET ORAL
Qty: 10 TABLET | Refills: 0 | Status: SHIPPED | OUTPATIENT
Start: 2019-12-28 | End: 2020-01-28

## 2019-12-28 RX ORDER — DOXYCYCLINE HYCLATE 100 MG
100 TABLET ORAL 2 TIMES DAILY
Qty: 20 TABLET | Refills: 0 | Status: SHIPPED | OUTPATIENT
Start: 2019-12-28 | End: 2020-01-07

## 2019-12-28 NOTE — PATIENT INSTRUCTIONS
Gout vs cellulitis vs other  Need to avoid nsaid due to CKD  Would like to avoid prednisone  Colchicine for gout empirically  Doxy for cellulitis  Labs and XR today  If not better in 48-72 hours or systemic symptoms develop, please go to the ED.

## 2019-12-30 ENCOUNTER — TELEPHONE (OUTPATIENT)
Dept: FAMILY MEDICINE | Facility: CLINIC | Age: 79
End: 2019-12-30

## 2019-12-30 ENCOUNTER — HOSPITAL ENCOUNTER (OUTPATIENT)
Dept: RADIOLOGY | Facility: HOSPITAL | Age: 79
Discharge: HOME | End: 2019-12-30
Attending: FAMILY MEDICINE
Payer: MEDICARE

## 2019-12-30 ENCOUNTER — TELEPHONE (OUTPATIENT)
Dept: INTERNAL MEDICINE | Facility: CLINIC | Age: 79
End: 2019-12-30

## 2019-12-30 DIAGNOSIS — N18.30 CKD (CHRONIC KIDNEY DISEASE) STAGE 3, GFR 30-59 ML/MIN: Primary | ICD-10-CM

## 2019-12-30 DIAGNOSIS — M79.672 FOOT PAIN, LEFT: ICD-10-CM

## 2019-12-30 DIAGNOSIS — L03.116 CELLULITIS OF LEFT LOWER EXTREMITY: ICD-10-CM

## 2019-12-30 DIAGNOSIS — M10.072 ACUTE IDIOPATHIC GOUT OF LEFT FOOT: ICD-10-CM

## 2019-12-30 PROCEDURE — 73630 XR FOOT COMPLETE 3 VIEW LEFT: ICD-10-PCS | Mod: 26,LT,, | Performed by: RADIOLOGY

## 2019-12-30 PROCEDURE — 73630 X-RAY EXAM OF FOOT: CPT | Mod: TC,FY,LT

## 2019-12-30 PROCEDURE — 73630 X-RAY EXAM OF FOOT: CPT | Mod: 26,LT,, | Performed by: RADIOLOGY

## 2019-12-30 NOTE — TELEPHONE ENCOUNTER
----- Message from Alayna Nino MA sent at 12/30/2019  3:58 PM CST -----  Patient would like BMP labs sent to [facility] in [place].

## 2019-12-30 NOTE — TELEPHONE ENCOUNTER
Alayna, please contact patient. I sent him a portal message but lisa how often he checks this    Kidney function is a little worse.    Please reach out to your kidney doctor for a follow up.      Remember, No NSAIDS such as ibuprofen or naproxen. Avoid Red meats. Drink a lot of water.     Uric acid was high; symptoms may be due to gout. Continue treatment as prescribed.     procal (marker of bacterial infection) was normal. I still want him to complete antibiotics.     XR didn't show any infection, just arthritis.     Repeat BMP in 7 days as well to assess kidney function.    Please arrange follow up with PCP in 2-3 weeks.

## 2019-12-30 NOTE — TELEPHONE ENCOUNTER
Spoke with patient to inform him about lab results . Appointment was scheduled to see  .  Patient voices understanding

## 2020-01-03 DIAGNOSIS — L03.119 CELLULITIS OF FOOT: Primary | ICD-10-CM

## 2020-01-06 ENCOUNTER — TELEPHONE (OUTPATIENT)
Dept: INTERNAL MEDICINE | Facility: CLINIC | Age: 80
End: 2020-01-06

## 2020-01-06 NOTE — TELEPHONE ENCOUNTER
----- Message from Edward Wilkerson MD sent at 1/3/2020  5:16 PM CST -----  Contact patient to get the following labs done to monitor treatment of his foot condition.    CMP, ESR, CRP. CBC

## 2020-01-06 NOTE — TELEPHONE ENCOUNTER
Spoke with patient to inform him about lab test scheduling  . Patient request labs sent to [facility] I informed patient labs will be sent and he will be able to have labs done. Patient voices understanding

## 2020-01-07 ENCOUNTER — TELEPHONE (OUTPATIENT)
Dept: INTERNAL MEDICINE | Facility: CLINIC | Age: 80
End: 2020-01-07

## 2020-01-07 ENCOUNTER — TELEPHONE (OUTPATIENT)
Dept: CARDIOLOGY | Facility: CLINIC | Age: 80
End: 2020-01-07

## 2020-01-07 ENCOUNTER — TELEPHONE (OUTPATIENT)
Dept: PHARMACY | Facility: CLINIC | Age: 80
End: 2020-01-07

## 2020-01-07 DIAGNOSIS — L53.9 ERYTHEMA OF LOWER EXTREMITY: Primary | ICD-10-CM

## 2020-01-07 DIAGNOSIS — I73.9 PAD (PERIPHERAL ARTERY DISEASE): Primary | ICD-10-CM

## 2020-01-07 DIAGNOSIS — I87.2 VENOUS INSUFFICIENCY (CHRONIC) (PERIPHERAL): ICD-10-CM

## 2020-01-07 DIAGNOSIS — N18.30 CHRONIC KIDNEY DISEASE, STAGE 3: ICD-10-CM

## 2020-01-07 LAB
BUN SERPL-MCNC: 29 MG/DL (ref 7–25)
BUN/CREAT SERPL: 18 (CALC) (ref 6–22)
CALCIUM SERPL-MCNC: 9.2 MG/DL (ref 8.6–10.3)
CHLORIDE SERPL-SCNC: 109 MMOL/L (ref 98–110)
CO2 SERPL-SCNC: 21 MMOL/L (ref 20–32)
CREAT SERPL-MCNC: 1.63 MG/DL (ref 0.7–1.11)
GFRSERPLBLD MDRD-ARVRAT: 39 ML/MIN/1.73M2
GLUCOSE SERPL-MCNC: 93 MG/DL (ref 65–99)
POTASSIUM SERPL-SCNC: 4.4 MMOL/L (ref 3.5–5.3)
SODIUM SERPL-SCNC: 138 MMOL/L (ref 135–146)

## 2020-01-07 NOTE — TELEPHONE ENCOUNTER
----- Message from Juliane Chiang sent at 1/7/2020  8:23 AM CST -----  Contact: Pt  Pt requesting a call back in regards to Lab Orders    Please call and advise    Phone [phone]    Thank You  
Spoke with pt. to inform him that labs have been sent to [facility] has he requested . Patient voices understanding .  
no

## 2020-01-07 NOTE — TELEPHONE ENCOUNTER
Praluent 150mg/ml PENS follow up. Confirmed two patient identifiers - name + . Goals of treatment reviewed - no change, pending further f/u with cardiologist - msg sent to Dr. Metzger and staff to f/u with patient accordingly. Labs reviewed - last labs 19 (LDL 97.8mg/dL) - not at goal of <70mg/dL but improved. Treatment continuation appropriate. Medication Reconciliation completed - no changes, no DDIs. Patient denies any side effects, visits to the ER/urgent care and missed days from school, work, or planned activities due to medical condition. Patient denies any missed doses and confirms proper administration (SQ every other Thursday - he has 1 dose for 20, further injection training declined. Injects on his thigh, rotating from left to right each time) and storage (refrigerated until about an hour before each injection). He mentions leaving the injection in for 30 seconds to ensure it is complete - he is advised to let go of the start button when he hears the 1st click so that he can hear 2nd click (completion) and confirm by looking at the window to see it is completely yellow before removing. Comorbidities and contraindications reviewed - no changes. Patient denies any recent changes to allergies, health conditions, or insurance. All questions answered to patients satisfaction. He continues to ask about the Peking Duck at [facility] in [place] - gave him restaurants phone # and address to go get him some, in moderation of course! [facility] to continue to follow up with patient in 12 months and monthly for refills. TTN

## 2020-01-09 LAB
ALBUMIN SERPL-MCNC: 4 G/DL (ref 3.6–5.1)
ALBUMIN/GLOB SERPL: 1.4 (CALC) (ref 1–2.5)
ALP SERPL-CCNC: 96 U/L (ref 40–115)
ALT SERPL-CCNC: 16 U/L (ref 9–46)
AST SERPL-CCNC: 30 U/L (ref 10–35)
BASOPHILS # BLD AUTO: 40 CELLS/UL (ref 0–200)
BASOPHILS NFR BLD AUTO: 0.5 %
BILIRUB SERPL-MCNC: 0.6 MG/DL (ref 0.2–1.2)
BUN SERPL-MCNC: 31 MG/DL (ref 7–25)
BUN/CREAT SERPL: 19 (CALC) (ref 6–22)
CALCIUM SERPL-MCNC: 9.4 MG/DL (ref 8.6–10.3)
CHLORIDE SERPL-SCNC: 108 MMOL/L (ref 98–110)
CO2 SERPL-SCNC: 23 MMOL/L (ref 20–32)
CREAT SERPL-MCNC: 1.64 MG/DL (ref 0.7–1.11)
CRP SERPL-MCNC: 2.8 MG/L
EOSINOPHIL # BLD AUTO: 474 CELLS/UL (ref 15–500)
EOSINOPHIL NFR BLD AUTO: 6 %
ERYTHROCYTE [DISTWIDTH] IN BLOOD BY AUTOMATED COUNT: 13.5 % (ref 11–15)
ERYTHROCYTE [SEDIMENTATION RATE] IN BLOOD BY WESTERGREN METHOD: 28 MM/H
GFRSERPLBLD MDRD-ARVRAT: 39 ML/MIN/1.73M2
GLOBULIN SER CALC-MCNC: 2.9 G/DL (CALC) (ref 1.9–3.7)
GLUCOSE SERPL-MCNC: 99 MG/DL (ref 65–99)
HCT VFR BLD AUTO: 38 % (ref 38.5–50)
HGB BLD-MCNC: 13 G/DL (ref 13.2–17.1)
LYMPHOCYTES # BLD AUTO: 2899 CELLS/UL (ref 850–3900)
LYMPHOCYTES NFR BLD AUTO: 36.7 %
MCH RBC QN AUTO: 32.4 PG (ref 27–33)
MCHC RBC AUTO-ENTMCNC: 34.2 G/DL (ref 32–36)
MCV RBC AUTO: 94.8 FL (ref 80–100)
MONOCYTES # BLD AUTO: 585 CELLS/UL (ref 200–950)
MONOCYTES NFR BLD AUTO: 7.4 %
NEUTROPHILS # BLD AUTO: 3903 CELLS/UL (ref 1500–7800)
NEUTROPHILS NFR BLD AUTO: 49.4 %
PLATELET # BLD AUTO: 223 THOUSAND/UL (ref 140–400)
PMV BLD REES-ECKER: 11.5 FL (ref 7.5–12.5)
POTASSIUM SERPL-SCNC: 4.6 MMOL/L (ref 3.5–5.3)
PROT SERPL-MCNC: 6.9 G/DL (ref 6.1–8.1)
RBC # BLD AUTO: 4.01 MILLION/UL (ref 4.2–5.8)
SODIUM SERPL-SCNC: 138 MMOL/L (ref 135–146)
WBC # BLD AUTO: 7.9 THOUSAND/UL (ref 3.8–10.8)

## 2020-01-28 ENCOUNTER — TELEPHONE (OUTPATIENT)
Dept: PHARMACY | Facility: CLINIC | Age: 80
End: 2020-01-28

## 2020-01-28 ENCOUNTER — OFFICE VISIT (OUTPATIENT)
Dept: INTERNAL MEDICINE | Facility: CLINIC | Age: 80
End: 2020-01-28
Payer: MEDICARE

## 2020-01-28 VITALS
DIASTOLIC BLOOD PRESSURE: 62 MMHG | HEART RATE: 55 BPM | OXYGEN SATURATION: 99 % | BODY MASS INDEX: 27.09 KG/M2 | HEIGHT: 73 IN | WEIGHT: 204.38 LBS | SYSTOLIC BLOOD PRESSURE: 114 MMHG

## 2020-01-28 DIAGNOSIS — I10 ESSENTIAL HYPERTENSION: ICD-10-CM

## 2020-01-28 DIAGNOSIS — G89.29 CHRONIC MIDLINE LOW BACK PAIN WITHOUT SCIATICA: Primary | ICD-10-CM

## 2020-01-28 DIAGNOSIS — I25.10 CORONARY ARTERY DISEASE, ANGINA PRESENCE UNSPECIFIED, UNSPECIFIED VESSEL OR LESION TYPE, UNSPECIFIED WHETHER NATIVE OR TRANSPLANTED HEART: ICD-10-CM

## 2020-01-28 DIAGNOSIS — B35.3 TINEA PEDIS, UNSPECIFIED LATERALITY: ICD-10-CM

## 2020-01-28 DIAGNOSIS — M54.50 CHRONIC MIDLINE LOW BACK PAIN WITHOUT SCIATICA: Primary | ICD-10-CM

## 2020-01-28 PROCEDURE — 99213 OFFICE O/P EST LOW 20 MIN: CPT | Mod: S$GLB,,, | Performed by: INTERNAL MEDICINE

## 2020-01-28 PROCEDURE — 99999 PR PBB SHADOW E&M-EST. PATIENT-LVL IV: ICD-10-PCS | Mod: PBBFAC,,, | Performed by: INTERNAL MEDICINE

## 2020-01-28 PROCEDURE — 99999 PR PBB SHADOW E&M-EST. PATIENT-LVL IV: CPT | Mod: PBBFAC,,, | Performed by: INTERNAL MEDICINE

## 2020-01-28 PROCEDURE — 3074F PR MOST RECENT SYSTOLIC BLOOD PRESSURE < 130 MM HG: ICD-10-PCS | Mod: CPTII,S$GLB,, | Performed by: INTERNAL MEDICINE

## 2020-01-28 PROCEDURE — 3078F DIAST BP <80 MM HG: CPT | Mod: CPTII,S$GLB,, | Performed by: INTERNAL MEDICINE

## 2020-01-28 PROCEDURE — 1101F PT FALLS ASSESS-DOCD LE1/YR: CPT | Mod: CPTII,S$GLB,, | Performed by: INTERNAL MEDICINE

## 2020-01-28 PROCEDURE — 3074F SYST BP LT 130 MM HG: CPT | Mod: CPTII,S$GLB,, | Performed by: INTERNAL MEDICINE

## 2020-01-28 PROCEDURE — 3078F PR MOST RECENT DIASTOLIC BLOOD PRESSURE < 80 MM HG: ICD-10-PCS | Mod: CPTII,S$GLB,, | Performed by: INTERNAL MEDICINE

## 2020-01-28 PROCEDURE — 1159F MED LIST DOCD IN RCRD: CPT | Mod: S$GLB,,, | Performed by: INTERNAL MEDICINE

## 2020-01-28 PROCEDURE — 1101F PR PT FALLS ASSESS DOC 0-1 FALLS W/OUT INJ PAST YR: ICD-10-PCS | Mod: CPTII,S$GLB,, | Performed by: INTERNAL MEDICINE

## 2020-01-28 PROCEDURE — 1126F PR PAIN SEVERITY QUANTIFIED, NO PAIN PRESENT: ICD-10-PCS | Mod: S$GLB,,, | Performed by: INTERNAL MEDICINE

## 2020-01-28 PROCEDURE — 1126F AMNT PAIN NOTED NONE PRSNT: CPT | Mod: S$GLB,,, | Performed by: INTERNAL MEDICINE

## 2020-01-28 PROCEDURE — 1159F PR MEDICATION LIST DOCUMENTED IN MEDICAL RECORD: ICD-10-PCS | Mod: S$GLB,,, | Performed by: INTERNAL MEDICINE

## 2020-01-28 PROCEDURE — 99213 PR OFFICE/OUTPT VISIT, EST, LEVL III, 20-29 MIN: ICD-10-PCS | Mod: S$GLB,,, | Performed by: INTERNAL MEDICINE

## 2020-01-28 RX ORDER — EFAVIRENZ 600 MG/1
TABLET, FILM COATED ORAL
Status: ON HOLD | COMMUNITY
Start: 2020-01-10 | End: 2020-02-12

## 2020-01-28 RX ORDER — CARVEDILOL 3.12 MG/1
3.12 TABLET ORAL 2 TIMES DAILY
Qty: 180 TABLET | Refills: 1 | Status: CANCELLED | OUTPATIENT
Start: 2020-01-28 | End: 2020-07-26

## 2020-01-28 RX ORDER — CARVEDILOL 3.12 MG/1
3.12 TABLET ORAL 2 TIMES DAILY
Qty: 180 TABLET | Refills: 1 | Status: SHIPPED | OUTPATIENT
Start: 2020-01-28 | End: 2021-07-28 | Stop reason: SDUPTHER

## 2020-01-28 NOTE — PROGRESS NOTES
Portions of this note are generated with voice recognition software. Typographical errors may exist.     SUBJECTIVE:    This is a/an 79 y.o. male here for primary care visit for  Chief Complaint   Patient presents with    Follow-up     Patient has benefited from lumbar back brace for routine AIDLs but has one that is too big. Gotten when he was obese.                 Medications Reviewed and Updated    Past medical, family, and social histories were reviewed and updated.    Review of Systems negative unless otherwise noted in history of present illness-  ROS    General ROS: negative  Psychological ROS: negative  ENT ROS: negative  Endocrine ROS: Negative  Allergy and Immunology ROS: negative  Cardiovascular ROS: negative  Pulmonary ROS: Negative  Gastrointestinal ROS: negative  Genito-Urinary ROS: negative        Allergic:    Review of patient's allergies indicates:   Allergen Reactions    Lipitor [atorvastatin] Other (See Comments)     myalgia       OBJECTIVE:  BP: 114/62 Pulse: (!) 55    Wt Readings from Last 3 Encounters:   01/28/20 92.7 kg (204 lb 5.9 oz)   12/28/19 92 kg (202 lb 13.2 oz)   12/04/19 91.2 kg (201 lb)    Body mass index is 26.96 kg/m².  Previous Blood Pressure Readings :   BP Readings from Last 3 Encounters:   01/28/20 114/62   12/28/19 118/66   12/04/19 128/70       Physical Exam    GEN: No apparent distress  HEENT: sclera non-icteric, conjunctiva clear  CV: no peripheral edema  PULM: breathing non-labored  ABD: non protuberant abdomen.  PSYCH: appropriate affect  MSK: able to rise from chair without assistance  SKIN: normal skin turgor    Pertinent Labs Reviewed                 ASSESSMENT/PLAN:    Chronic midline low back pain without sciatica.Condition stable.  Counseling given today on self-care measures. Plan to monitor clinically. Continue current medical plan.   -     HME - OTHER    Essential hypertension .Condition stable.  Counseling given today on self-care measures. Plan to monitor  clinically. Continue current medical plan.   -     carvedilol (COREG) 3.125 MG tablet; Take 1 tablet (3.125 mg total) by mouth 2 (two) times daily.  Dispense: 180 tablet; Refill: 1    Tinea pedis, unspecified laterality.Condition improving.  Counseling on self-care measures today.  Continue with current plan in addition to recommendations written on After Visit Summary.         Future Appointments   Date Time Provider Department Center   2/12/2020 11:00 AM Manuel Metzger MD [facility] [place]   3/18/2020  9:00 AM Myah Perry MD [facility]   4/28/2020  9:40 AM Edward Wilkerson MD [facility]       Edward Wilkerson  2/2/2020  10:32 AM

## 2020-01-28 NOTE — PATIENT INSTRUCTIONS
[facility]    [phone]          Ejercicios Para Fortalecer La Parte Baja De La Espalda [Back Exercises]  Si están gray, los músculos de la parte baja de la espalda y los abdominales pueden actuar conjuntamente para brindar un buen apoyo a la columna. Los ejercicios descritos a continuación le ayudarán a fortalecer los músculos de la parte baja de la espalda. Es importante que comience a hacer ejercicios lentamente y que aumente los niveles de intensidad poco a poco.  Comience siempre cualquier programa de ejercicios estirando los músculos. Si siente dolor mientras hace alguno de estos ejercicios, deténgase y consulte con marcus médico, para que le recomiende un programa específico más adecuado a marcus nivel de forma física.  Estiramiento De La Parte Baja De La Espalda  · Acuéstese boca arriba con las rodillas flexionadas y ambos pies apoyados en el piso.  · Levante lentamente la rodilla izquierda hacia el pecho, conforme aplana la espalda contra el piso. Sostenga esta posición felton 5 segundos.  · Relájese y repita el ejercicio con la rodilla derecha.  · Lluvia jermaine ejercicio 10 veces con cada pierna.  · Repita el ejercicio abrazando las dos rodillas y presionándolas contra el pecho al mismo tiempo.  Desarrollo De La Fuerza En La Parte Baja De La Espalda  1. Extensión lumbar de rodillas: Comience en posición de cuatro patas. Levante y enderece simultáneamente el brazo derecho y la pierna izquierda hasta que ambos miembros estén paralelos al suelo. Sostenga esta posición felton 2 segundos y vuelva lentamente al punto de mateus. Repita el ejercicio con el brazo thang y la pierna derecha. Alterne las posiciones 10 veces.  2. Extensión lumbar boca abajo: Acuéstese boca abajo, con los brazos extendidos hacia arriba y las mendoza contra el piso. Levante simultáneamente marcus brazo derecho y marcus pierna izquierda hasta donde pueda elevarlos sin sentir molestias. Sostenga esta posición felton 10 segundos y  vuelva lentamente al punto de mateus. Repita el ejercicio con el brazo thang y la pierna derecha. Alterne las posiciones 10 veces. Prolongue gradualmente jermaine ejercicio hasta repetirlo 20 veces. (Nivel avanzado: Repita jermaine ejercicio levantando los dos brazos y las dos piernas al mismo tiempo hasta que estén a unos cuantos centímetros del piso. Sostenga esta posición por 5 segundos y relájese.)  3. Inclinación pélvica: Acuéstese boca arriba en el suelo, con las rodillas flexionadas a 90 grados. Deje los pies apoyados contra el piso. Inspire, espire y luego contraiga lentamente los músculos abdominales llevando el ombligo hacia la columna vertebral. Deje que la pelvis se mueva hacia atrás hasta que la parte baja de la espalda esté completamente apoyada en el piso. Sostenga esta posición felton 10 segundos mientras respira normalmente.  4. Abdominales cortos: Realice un ejercicio de inclinación pélvica (explicado arriba) apoyando la parte baja de la espalda contra el suelo. Mientras mantiene la tensión de naeem músculos abdominales, respire alyssa vez más y levante los omóplatos del piso (esta posición no equivale a la de sentadilla completa). Mantenga la baldomero alineada con el cielo del cuerpo (no doble el melia hacia ludwig). Sostenga la posición por 2 segundos, luego baje lentamente.  Date Last Reviewed: 6/1/2016  © 9608-9602 [employer]. [address] [place] [id number]. Todos los derechos reservados. Esta información no pretende sustituir la atención médica profesional. Sólo marcus médico puede diagnosticar y tratar un problema de glenn.

## 2020-01-28 NOTE — TELEPHONE ENCOUNTER
Praluent refill:  New prescription received for Praluent. Patient confirmed readiness for refill. He will be due for his next injection on 2020. No new medications, allergies or health conditions. No missed doses. Denies any visits to the urgent care/ER. He continues to do well with treatment and has no side effects, questions or concerns. Praluent will ship 2020 for delivery on 2020. Copay $8.95 - [place] patient would like to send check. Address confirmed. Two patient identifiers confirmed - name and . Therapy appropriate.

## 2020-01-28 NOTE — LETTER
February 2, 2020      Frederick Roberson,   [address]  [place] [address]           [facility]  [address] [place] [address]  Phone: [phone]  Fax: [phone]          Patient: Osorio Boggs   MR Number: [id number]   YOB: 1940   Date of Visit: 1/28/2020       Dear Dr. Frederick Roberson:    Thank you for referring Osorio Boggs to me for evaluation. Attached you will find relevant portions of my assessment and plan of care.    If you have questions, please do not hesitate to call me. I look forward to following Osorio Boggs along with you.    Sincerely,    Edward Wilkerson MD    Enclosure  CC:  No Recipients    If you would like to receive this communication electronically, please contact [email or web address] or [phone] to request more information on [employer] access.    For providers and/or their staff who would like to refer a patient to [facility], please contact us through our one-stop-shop provider referral line, [facility], at [phone].    If you feel you have received this communication in error or would no longer like to receive these types of communications, please e-mail [email or web address]

## 2020-02-12 ENCOUNTER — HOSPITAL ENCOUNTER (INPATIENT)
Facility: HOSPITAL | Age: 80
LOS: 2 days | Discharge: HOME | DRG: 381 | End: 2020-02-14
Attending: EMERGENCY MEDICINE | Admitting: INTERNAL MEDICINE
Payer: MEDICARE

## 2020-02-12 ENCOUNTER — TELEPHONE (OUTPATIENT)
Dept: INTERNAL MEDICINE | Facility: CLINIC | Age: 80
End: 2020-02-12

## 2020-02-12 ENCOUNTER — TELEPHONE (OUTPATIENT)
Dept: FAMILY MEDICINE | Facility: CLINIC | Age: 80
End: 2020-02-12

## 2020-02-12 DIAGNOSIS — R06.02 SHORTNESS OF BREATH: ICD-10-CM

## 2020-02-12 DIAGNOSIS — R73.9 HYPERGLYCEMIA: ICD-10-CM

## 2020-02-12 DIAGNOSIS — Z21 HIV INFECTION, ASYMPTOMATIC: ICD-10-CM

## 2020-02-12 DIAGNOSIS — E87.5 HYPERKALEMIA: ICD-10-CM

## 2020-02-12 DIAGNOSIS — D62 ACUTE BLOOD LOSS ANEMIA: Primary | ICD-10-CM

## 2020-02-12 DIAGNOSIS — E87.1 HYPONATREMIA: ICD-10-CM

## 2020-02-12 DIAGNOSIS — K22.10 ULCER OF ESOPHAGUS WITHOUT BLEEDING: ICD-10-CM

## 2020-02-12 DIAGNOSIS — N18.30 CHRONIC KIDNEY DISEASE, STAGE 3: ICD-10-CM

## 2020-02-12 DIAGNOSIS — R53.1 WEAKNESS: ICD-10-CM

## 2020-02-12 DIAGNOSIS — K92.2 UPPER GI BLEED: ICD-10-CM

## 2020-02-12 DIAGNOSIS — K92.1 GASTROINTESTINAL HEMORRHAGE WITH MELENA: ICD-10-CM

## 2020-02-12 DIAGNOSIS — D64.9 SYMPTOMATIC ANEMIA: ICD-10-CM

## 2020-02-12 DIAGNOSIS — I82.533 CHRONIC DEEP VEIN THROMBOSIS (DVT) OF POPLITEAL VEIN OF BOTH LOWER EXTREMITIES: ICD-10-CM

## 2020-02-12 LAB
ABO + RH BLD: NORMAL
ALBUMIN SERPL BCP-MCNC: 2.9 G/DL (ref 3.5–5.2)
ALP SERPL-CCNC: 49 U/L (ref 38–126)
ALT SERPL W/O P-5'-P-CCNC: 11 U/L (ref 10–44)
ANION GAP SERPL CALC-SCNC: 5 MMOL/L (ref 8–16)
ANION GAP SERPL CALC-SCNC: 6 MMOL/L (ref 8–16)
ANION GAP SERPL CALC-SCNC: 8 MMOL/L (ref 8–16)
AST SERPL-CCNC: 37 U/L (ref 15–46)
BASOPHILS # BLD AUTO: 0.02 K/UL (ref 0–0.2)
BASOPHILS NFR BLD: 0.1 % (ref 0–1.9)
BILIRUB SERPL-MCNC: <0.1 MG/DL (ref 0.1–1)
BLD GP AB SCN CELLS X3 SERPL QL: NORMAL
BUN SERPL-MCNC: 90 MG/DL (ref 2–20)
BUN SERPL-MCNC: 91 MG/DL (ref 2–20)
BUN SERPL-MCNC: 95 MG/DL (ref 8–23)
CALCIUM SERPL-MCNC: 8 MG/DL (ref 8.7–10.5)
CALCIUM SERPL-MCNC: 8.1 MG/DL (ref 8.7–10.5)
CALCIUM SERPL-MCNC: 8.2 MG/DL (ref 8.7–10.5)
CHLORIDE SERPL-SCNC: 112 MMOL/L (ref 95–110)
CHLORIDE SERPL-SCNC: 114 MMOL/L (ref 95–110)
CHLORIDE SERPL-SCNC: 115 MMOL/L (ref 95–110)
CO2 SERPL-SCNC: 15 MMOL/L (ref 23–29)
CO2 SERPL-SCNC: 15 MMOL/L (ref 23–29)
CO2 SERPL-SCNC: 16 MMOL/L (ref 23–29)
CREAT SERPL-MCNC: 1.74 MG/DL (ref 0.5–1.4)
CREAT SERPL-MCNC: 1.74 MG/DL (ref 0.5–1.4)
CREAT SERPL-MCNC: 1.8 MG/DL (ref 0.5–1.4)
DIFFERENTIAL METHOD: ABNORMAL
EOSINOPHIL # BLD AUTO: 0.2 K/UL (ref 0–0.5)
EOSINOPHIL NFR BLD: 1.2 % (ref 0–8)
ERYTHROCYTE [DISTWIDTH] IN BLOOD BY AUTOMATED COUNT: 14.4 % (ref 11.5–14.5)
EST. GFR  (AFRICAN AMERICAN): 40 ML/MIN/1.73 M^2
EST. GFR  (AFRICAN AMERICAN): 41.6 ML/MIN/1.73 M^2
EST. GFR  (AFRICAN AMERICAN): 41.6 ML/MIN/1.73 M^2
EST. GFR  (NON AFRICAN AMERICAN): 35 ML/MIN/1.73 M^2
EST. GFR  (NON AFRICAN AMERICAN): 36 ML/MIN/1.73 M^2
EST. GFR  (NON AFRICAN AMERICAN): 36 ML/MIN/1.73 M^2
ESTIMATED AVG GLUCOSE: 126 MG/DL (ref 68–131)
FERRITIN SERPL-MCNC: 72 NG/ML (ref 20–300)
GLUCOSE SERPL-MCNC: 159 MG/DL (ref 70–110)
GLUCOSE SERPL-MCNC: 161 MG/DL (ref 70–110)
GLUCOSE SERPL-MCNC: 179 MG/DL (ref 70–110)
HBA1C MFR BLD HPLC: 6 % (ref 4–5.6)
HCT VFR BLD AUTO: 19.6 % (ref 40–54)
HGB BLD-MCNC: 6.2 G/DL (ref 14–18)
IMM GRANULOCYTES # BLD AUTO: 0.25 K/UL (ref 0–0.04)
IMM GRANULOCYTES NFR BLD AUTO: 1.8 % (ref 0–0.5)
LYMPHOCYTES # BLD AUTO: 3.7 K/UL (ref 1–4.8)
LYMPHOCYTES NFR BLD: 27 % (ref 18–48)
MCH RBC QN AUTO: 32 PG (ref 27–31)
MCHC RBC AUTO-ENTMCNC: 31.6 G/DL (ref 32–36)
MCV RBC AUTO: 101 FL (ref 82–98)
MONOCYTES # BLD AUTO: 1 K/UL (ref 0.3–1)
MONOCYTES NFR BLD: 6.9 % (ref 4–15)
NEUTROPHILS # BLD AUTO: 8.7 K/UL (ref 1.8–7.7)
NEUTROPHILS NFR BLD: 63 % (ref 38–73)
NRBC BLD-RTO: 1 /100 WBC
NT-PROBNP: 207 PG/ML (ref 5–1800)
OB PNL STL: POSITIVE
PLATELET # BLD AUTO: 165 K/UL (ref 150–350)
PMV BLD AUTO: 11.6 FL (ref 9.2–12.9)
POTASSIUM SERPL-SCNC: 4.8 MMOL/L (ref 3.5–5.1)
POTASSIUM SERPL-SCNC: 5.4 MMOL/L (ref 3.5–5.1)
POTASSIUM SERPL-SCNC: 6.2 MMOL/L (ref 3.5–5.1)
PROT SERPL-MCNC: 5.5 G/DL (ref 6–8.4)
RBC # BLD AUTO: 1.94 M/UL (ref 4.6–6.2)
SODIUM SERPL-SCNC: 133 MMOL/L (ref 136–145)
SODIUM SERPL-SCNC: 135 MMOL/L (ref 136–145)
SODIUM SERPL-SCNC: 138 MMOL/L (ref 136–145)
TROPONIN I SERPL DL<=0.01 NG/ML-MCNC: <0.012 NG/ML (ref 0.01–0.03)
WBC # BLD AUTO: 13.79 K/UL (ref 3.9–12.7)

## 2020-02-12 PROCEDURE — 25000003 PHARM REV CODE 250: Mod: ER | Performed by: FAMILY MEDICINE

## 2020-02-12 PROCEDURE — C9113 INJ PANTOPRAZOLE SODIUM, VIA: HCPCS | Mod: ER | Performed by: EMERGENCY MEDICINE

## 2020-02-12 PROCEDURE — 93010 ELECTROCARDIOGRAM REPORT: CPT | Mod: ,,, | Performed by: INTERNAL MEDICINE

## 2020-02-12 PROCEDURE — 85025 COMPLETE CBC W/AUTO DIFF WBC: CPT | Mod: ER

## 2020-02-12 PROCEDURE — 80053 COMPREHEN METABOLIC PANEL: CPT | Mod: ER

## 2020-02-12 PROCEDURE — 82272 OCCULT BLD FECES 1-3 TESTS: CPT | Mod: ER

## 2020-02-12 PROCEDURE — 93005 ELECTROCARDIOGRAM TRACING: CPT | Mod: ER

## 2020-02-12 PROCEDURE — 82607 VITAMIN B-12: CPT

## 2020-02-12 PROCEDURE — 84484 ASSAY OF TROPONIN QUANT: CPT | Mod: ER

## 2020-02-12 PROCEDURE — 86901 BLOOD TYPING SEROLOGIC RH(D): CPT

## 2020-02-12 PROCEDURE — 25000003 PHARM REV CODE 250: Performed by: STUDENT IN AN ORGANIZED HEALTH CARE EDUCATION/TRAINING PROGRAM

## 2020-02-12 PROCEDURE — 83036 HEMOGLOBIN GLYCOSYLATED A1C: CPT

## 2020-02-12 PROCEDURE — 83880 ASSAY OF NATRIURETIC PEPTIDE: CPT | Mod: ER

## 2020-02-12 PROCEDURE — 94640 AIRWAY INHALATION TREATMENT: CPT | Mod: ER

## 2020-02-12 PROCEDURE — 86920 COMPATIBILITY TEST SPIN: CPT

## 2020-02-12 PROCEDURE — 83540 ASSAY OF IRON: CPT

## 2020-02-12 PROCEDURE — 99291 CRITICAL CARE FIRST HOUR: CPT | Mod: 25,ER

## 2020-02-12 PROCEDURE — 99292 CRITICAL CARE ADDL 30 MIN: CPT | Mod: ER

## 2020-02-12 PROCEDURE — 63600175 PHARM REV CODE 636 W HCPCS: Mod: ER | Performed by: EMERGENCY MEDICINE

## 2020-02-12 PROCEDURE — 82728 ASSAY OF FERRITIN: CPT

## 2020-02-12 PROCEDURE — 93010 EKG 12-LEAD: ICD-10-PCS | Mod: ,,, | Performed by: INTERNAL MEDICINE

## 2020-02-12 PROCEDURE — 80048 BASIC METABOLIC PNL TOTAL CA: CPT | Mod: 91

## 2020-02-12 PROCEDURE — 82746 ASSAY OF FOLIC ACID SERUM: CPT

## 2020-02-12 PROCEDURE — 11000001 HC ACUTE MED/SURG PRIVATE ROOM

## 2020-02-12 PROCEDURE — 25000242 PHARM REV CODE 250 ALT 637 W/ HCPCS: Mod: ER | Performed by: FAMILY MEDICINE

## 2020-02-12 PROCEDURE — 63600175 PHARM REV CODE 636 W HCPCS: Mod: ER | Performed by: FAMILY MEDICINE

## 2020-02-12 PROCEDURE — P9021 RED BLOOD CELLS UNIT: HCPCS

## 2020-02-12 PROCEDURE — 80048 BASIC METABOLIC PNL TOTAL CA: CPT | Mod: ER

## 2020-02-12 RX ORDER — LATANOPROST 50 UG/ML
1 SOLUTION/ DROPS OPHTHALMIC NIGHTLY
Status: DISCONTINUED | OUTPATIENT
Start: 2020-02-12 | End: 2020-02-14 | Stop reason: HOSPADM

## 2020-02-12 RX ORDER — PANTOPRAZOLE SODIUM 40 MG/10ML
80 INJECTION, POWDER, LYOPHILIZED, FOR SOLUTION INTRAVENOUS
Status: COMPLETED | OUTPATIENT
Start: 2020-02-12 | End: 2020-02-12

## 2020-02-12 RX ORDER — DARUNAVIR 800 MG/1
TABLET, FILM COATED ORAL DAILY
COMMUNITY
End: 2020-10-14 | Stop reason: SDUPTHER

## 2020-02-12 RX ORDER — SODIUM BICARBONATE 1 MEQ/ML
50 SYRINGE (ML) INTRAVENOUS
Status: COMPLETED | OUTPATIENT
Start: 2020-02-12 | End: 2020-02-12

## 2020-02-12 RX ORDER — NIFEDIPINE 30 MG/1
30 TABLET, EXTENDED RELEASE ORAL DAILY
Status: DISCONTINUED | OUTPATIENT
Start: 2020-02-13 | End: 2020-02-14 | Stop reason: HOSPADM

## 2020-02-12 RX ORDER — CARVEDILOL 3.12 MG/1
3.12 TABLET ORAL 2 TIMES DAILY
Status: DISCONTINUED | OUTPATIENT
Start: 2020-02-12 | End: 2020-02-12

## 2020-02-12 RX ORDER — PANTOPRAZOLE SODIUM 40 MG/10ML
40 INJECTION, POWDER, LYOPHILIZED, FOR SOLUTION INTRAVENOUS 2 TIMES DAILY
Status: DISCONTINUED | OUTPATIENT
Start: 2020-02-13 | End: 2020-02-14 | Stop reason: HOSPADM

## 2020-02-12 RX ORDER — HYDROCODONE BITARTRATE AND ACETAMINOPHEN 500; 5 MG/1; MG/1
TABLET ORAL
Status: DISCONTINUED | OUTPATIENT
Start: 2020-02-12 | End: 2020-02-14 | Stop reason: HOSPADM

## 2020-02-12 RX ORDER — ALBUTEROL SULFATE 2.5 MG/.5ML
2.5 SOLUTION RESPIRATORY (INHALATION)
Status: COMPLETED | OUTPATIENT
Start: 2020-02-12 | End: 2020-02-12

## 2020-02-12 RX ORDER — BRIMONIDINE TARTRATE 1.5 MG/ML
1 SOLUTION/ DROPS OPHTHALMIC 2 TIMES DAILY
Status: DISCONTINUED | OUTPATIENT
Start: 2020-02-12 | End: 2020-02-14 | Stop reason: HOSPADM

## 2020-02-12 RX ORDER — CALCIUM GLUCONATE 98 MG/ML
1 INJECTION, SOLUTION INTRAVENOUS
Status: COMPLETED | OUTPATIENT
Start: 2020-02-12 | End: 2020-02-12

## 2020-02-12 RX ORDER — ALLOPURINOL 100 MG/1
100 TABLET ORAL DAILY
Status: DISCONTINUED | OUTPATIENT
Start: 2020-02-13 | End: 2020-02-14 | Stop reason: HOSPADM

## 2020-02-12 RX ORDER — CARVEDILOL 3.12 MG/1
3.12 TABLET ORAL 2 TIMES DAILY
Status: DISCONTINUED | OUTPATIENT
Start: 2020-02-13 | End: 2020-02-14 | Stop reason: HOSPADM

## 2020-02-12 RX ADMIN — ALBUTEROL SULFATE 2.5 MG: 2.5 SOLUTION RESPIRATORY (INHALATION) at 07:02

## 2020-02-12 RX ADMIN — BRIMONIDINE TARTRATE 1 DROP: 1.5 SOLUTION OPHTHALMIC at 11:02

## 2020-02-12 RX ADMIN — LATANOPROST 1 DROP: 50 SOLUTION OPHTHALMIC at 11:02

## 2020-02-12 RX ADMIN — SODIUM POLYSTYRENE SULFONATE 30 G: 15 SUSPENSION ORAL; RECTAL at 07:02

## 2020-02-12 RX ADMIN — CALCIUM GLUCONATE 1 G: 98 INJECTION, SOLUTION INTRAVENOUS at 07:02

## 2020-02-12 RX ADMIN — PANTOPRAZOLE SODIUM 80 MG: 40 INJECTION, POWDER, LYOPHILIZED, FOR SOLUTION INTRAVENOUS at 07:02

## 2020-02-12 RX ADMIN — ALBUTEROL SULFATE 2.5 MG: 2.5 SOLUTION RESPIRATORY (INHALATION) at 06:02

## 2020-02-12 RX ADMIN — SODIUM BICARBONATE 50 MEQ: 84 INJECTION INTRAVENOUS at 07:02

## 2020-02-12 NOTE — TELEPHONE ENCOUNTER
Returned pt's phone call, he reported he gets weak walking long distances. He is still lethargic and also reported black stools x 3 days. He complaints of pain in his left hip. Informed him that Dr. Wilkerson recommends that he considered going to the emergency room. Pt verbalized an understanding.

## 2020-02-12 NOTE — ED NOTES
Pt sitting up in bed watching TV, AAOx3.  Denies pain at present.No bowel movement or rectal bleeding in ER.  Will continue to monitor.

## 2020-02-12 NOTE — TELEPHONE ENCOUNTER
----- Message from Kristina Singleton sent at 2/12/2020 12:32 PM CST -----  Contact: self  Patient is returning your call.

## 2020-02-12 NOTE — TELEPHONE ENCOUNTER
Shimon NP   States that she saw the patient today for routine evaluation.  Systolic blood pressure was in the low 100 range.  Patient states this happens from time to time but what is unusual as the patient was more lethargic than usual.  Unsteady gait.  Nurse practitioner did not feel comfortable sending him back home by private transportation which is how he got to the appointment and recommended emergency room evaluation however the patient has not followed medical advice.     Left detailed message with the patient on voicemail to contact the clinic for further evaluation

## 2020-02-12 NOTE — PLAN OF CARE
[facility] Plan of Care Note    Called about Mr. Boggs who is having melena and anemia requiring transfusion on eliquis. He will be transferred from [facility] to [place]. He was started by the ED on PPI IV and transfusion is being prepared. Currently his vitals are stable.    Please keep him NPO for EGD in the morning or sooner if needed.     Janette Quevedo MD MPH  [facility] Gastroenterology PGY 4  [phone] cell  [phone] pager

## 2020-02-12 NOTE — ED PROVIDER NOTES
"Encounter Date: 2/12/2020       History     Chief Complaint   Patient presents with    Melena     Pt states started with black stool 2-3 days ago.  Pt denies foul odor to stool.  Pt denies any rectal pain.  Pt states "gets tired" frequently.  Pt states left hip painful with ambulation.       79-year-old male presents emergency department complaining of generalized weakness and fatigue.  Notes he has been seeing black stool when he defecates for the last few days.  States he gets easily fatigued and feels lightheaded when he stands.  Also notes exertional shortness of breath. Denies any chest pain, cough, or congestion.  Denies any nausea, vomiting, or abdominal pain. Shortness of breath resolves if he rests.  The lightheadedness is elicited with standing, resolves after a few seconds of rest. Reports some right hip pain, worse with movement and without alleviating factors, onset a few days ago.  No other symptoms reported at this time.  Denies any syncope, falls, or trauma.        Review of patient's allergies indicates:   Allergen Reactions    Lipitor [atorvastatin] Other (See Comments)     myalgia     Past Medical History:   Diagnosis Date    Anemia     CAD (coronary artery disease)     CKD (chronic kidney disease) stage 3, GFR 30-59 ml/min     Edema     Glaucoma     HIV infection     Hyperkalemia     Hyperlipidemia     Hypertension     Hypocalcemia     Renal cyst, acquired, right     Rheumatoid factor positive     Vitamin D deficiency      Past Surgical History:   Procedure Laterality Date    BACK SURGERY      CATARACT EXTRACTION      CORONARY ANGIOPLASTY  06/19/2017    PERCUTANEOUS ENDOVENOUS LASER ABLATION OF PERIPHERAL VEIN N/A 7/17/2019    Procedure: Ablation, Vein, Peripheral, Percutaneous, Endovenous, Using Laser;  Surgeon: Manuel Metzger MD;  Location: [facility] CATH LAB/EP;  Service: Cardiology;  Laterality: N/A;    SPINE SURGERY       Family History   Problem Relation Age of Onset    No " Known Problems Sister     No Known Problems Brother     No Known Problems Daughter     Drug abuse Son     No Known Problems Brother     No Known Problems Brother     No Known Problems Daughter      Social History     Tobacco Use    Smoking status: Never Smoker    Smokeless tobacco: Never Used   Substance Use Topics    Alcohol use: No    Drug use: No     Review of Systems   Constitutional: Positive for fatigue. Negative for chills and fever.   HENT: Negative for congestion, sore throat and voice change.    Eyes: Negative for photophobia, pain and redness.   Respiratory: Positive for shortness of breath. Negative for cough and choking.    Cardiovascular: Negative for chest pain, palpitations and leg swelling.   Gastrointestinal: Negative for abdominal pain, diarrhea, nausea and vomiting.   Genitourinary: Negative for dysuria, frequency and urgency.   Musculoskeletal: Negative for back pain, neck pain and neck stiffness.   Neurological: Positive for light-headedness. Negative for numbness and headaches.   All other systems reviewed and are negative.      Physical Exam     Initial Vitals [02/12/20 1442]   BP Pulse Resp Temp SpO2   128/61 80 18 98.3 °F (36.8 °C) 100 %      MAP       --         Physical Exam    Nursing note and vitals reviewed.  Constitutional: He appears well-developed and well-nourished. No distress.   HENT:   Head: Normocephalic and atraumatic.   Oropharynx clear; Dry MM    Eyes: Conjunctivae and EOM are normal. Pupils are equal, round, and reactive to light.   Neck: Normal range of motion. Neck supple. No tracheal deviation present.   Cardiovascular: Normal rate, regular rhythm, normal heart sounds and intact distal pulses.   Pulmonary/Chest: Breath sounds normal. No respiratory distress. He has no wheezes. He has no rhonchi. He has no rales.   Abdominal: Soft. Bowel sounds are normal. He exhibits no distension. There is no tenderness.   Genitourinary: Prostate normal.   Musculoskeletal:  Normal range of motion. He exhibits no edema or tenderness.   Neurological: He is alert and oriented to person, place, and time. He has normal strength. No cranial nerve deficit.   Skin: Skin is warm and dry.         ED Course   Critical Care  Date/Time: 2/12/2020 5:54 PM  Performed by: Satish Morgan MD  Authorized by: Satish Morgan MD   Direct patient critical care time: 15 minutes  Additional history critical care time: 15 minutes  Ordering / reviewing critical care time: 15 minutes  Documentation critical care time: 15 minutes  Consulting other physicians critical care time: 15 minutes  Consult with family critical care time: 10 minutes  Other critical care time: 0 minutes  Total critical care time (exclusive of procedural time) : 85 minutes  Critical care time was exclusive of separately billable procedures and treating other patients.  Critical care was necessary to treat or prevent imminent or life-threatening deterioration of the following conditions: circulatory failure.  Critical care was time spent personally by me on the following activities: discussions with consultants, evaluation of patient's response to treatment, obtaining history from patient or surrogate, ordering and review of laboratory studies, review of old charts, pulse oximetry, re-evaluation of patient's condition, ordering and review of radiographic studies, ordering and performing treatments and interventions, examination of patient, interpretation of cardiac output measurements and development of treatment plan with patient or surrogate.        Labs Reviewed   CBC W/ AUTO DIFFERENTIAL   COMPREHENSIVE METABOLIC PANEL   TROPONIN I   B-TYPE NATRIURETIC PEPTIDE   OCCULT BLOOD X 1, STOOL     EKG Readings: (Independently Interpreted)   Initial Reading: No STEMI. Previous EKG: Compared with most recent EKG Previous EKG Date: 9/28/18 (MInimal change). Rhythm: Normal Sinus Rhythm. Heart Rate: 71. Ectopy: PACs. Conduction: Normal. ST  Segments: Normal ST Segments. T Waves: Normal. Axis: Normal.       Imaging Results    None          Medical Decision Making:   Initial Assessment:   79-year-old male presents emergency department complaining of black stool and generalized weakness  Differential Diagnosis:   Arrhythmia, dehydration, electrolyte dyscrasias, anemia, upper GI bleed  Independently Interpreted Test(s):   I have ordered and independently interpreted X-rays - see prior notes.  I have ordered and independently interpreted EKG Reading(s) - see prior notes  Clinical Tests:   Lab Tests: Reviewed       <> Summary of Lab: Concerning for Hemoccult-positive stool and acute anemia  ED Management:  Patient has been given some Protonix here.  I have informed him of the results as well as plan to transfer to [place] for definitive management.  I discussed the case with Dr. Momin with [facility] and I have placed a consult order.  GI team will see him in the morning.  I have discussed case with [facility] internal medicine who agree with plan to admit for observation for further management and they will order a type and screen and transfusion with the patient arrives at [place].                                 Clinical Impression:       ICD-10-CM ICD-9-CM   1. Symptomatic anemia D64.9 285.9   2. Weakness R53.1 780.79   3. Shortness of breath R06.02 786.05   4. Upper GI bleed K92.2 578.9         Disposition:   Disposition: Placed in Observation  Condition: Jamilah Morgan MD  02/12/20 8622

## 2020-02-12 NOTE — TELEPHONE ENCOUNTER
----- Message from Leland Cartwright sent at 2/12/2020 12:38 PM CST -----  Contact: [phone] / self   Patient is returning a call from your office. Please Advise.

## 2020-02-12 NOTE — TELEPHONE ENCOUNTER
----- Message from Edward Wilkerson MD sent at 2/12/2020 11:51 AM CST -----  Please continue attempting to contact the patient who have not been feeling well.  When you get him on the phone have him give some details about how he is feeling now. If he continues to be lethargic and unsteady while walking he needs to consider going to [facility] for evaluation

## 2020-02-13 ENCOUNTER — ANESTHESIA EVENT (OUTPATIENT)
Dept: ENDOSCOPY | Facility: HOSPITAL | Age: 80
DRG: 381 | End: 2020-02-13
Payer: MEDICARE

## 2020-02-13 ENCOUNTER — ANESTHESIA (OUTPATIENT)
Dept: ENDOSCOPY | Facility: HOSPITAL | Age: 80
DRG: 381 | End: 2020-02-13
Payer: MEDICARE

## 2020-02-13 PROBLEM — K92.1 GASTROINTESTINAL HEMORRHAGE WITH MELENA: Status: ACTIVE | Noted: 2020-02-13

## 2020-02-13 LAB
ALBUMIN SERPL BCP-MCNC: 2.8 G/DL (ref 3.5–5.2)
ALP SERPL-CCNC: 49 U/L (ref 55–135)
ALT SERPL W/O P-5'-P-CCNC: 11 U/L (ref 10–44)
ANION GAP SERPL CALC-SCNC: 7 MMOL/L (ref 8–16)
AST SERPL-CCNC: 20 U/L (ref 10–40)
BASOPHILS # BLD AUTO: 0.03 K/UL (ref 0–0.2)
BASOPHILS NFR BLD: 0.2 % (ref 0–1.9)
BILIRUB SERPL-MCNC: 0.3 MG/DL (ref 0.1–1)
BLD PROD TYP BPU: NORMAL
BLD PROD TYP BPU: NORMAL
BLOOD UNIT EXPIRATION DATE: NORMAL
BLOOD UNIT EXPIRATION DATE: NORMAL
BLOOD UNIT TYPE CODE: 7300
BLOOD UNIT TYPE CODE: 7300
BLOOD UNIT TYPE: NORMAL
BLOOD UNIT TYPE: NORMAL
BUN SERPL-MCNC: 92 MG/DL (ref 8–23)
CALCIUM SERPL-MCNC: 8 MG/DL (ref 8.7–10.5)
CHLORIDE SERPL-SCNC: 115 MMOL/L (ref 95–110)
CO2 SERPL-SCNC: 17 MMOL/L (ref 23–29)
CODING SYSTEM: NORMAL
CODING SYSTEM: NORMAL
CREAT SERPL-MCNC: 1.8 MG/DL (ref 0.5–1.4)
DIFFERENTIAL METHOD: ABNORMAL
DISPENSE STATUS: NORMAL
DISPENSE STATUS: NORMAL
EOSINOPHIL # BLD AUTO: 0.1 K/UL (ref 0–0.5)
EOSINOPHIL NFR BLD: 0.6 % (ref 0–8)
ERYTHROCYTE [DISTWIDTH] IN BLOOD BY AUTOMATED COUNT: 17.4 % (ref 11.5–14.5)
EST. GFR  (AFRICAN AMERICAN): 40 ML/MIN/1.73 M^2
EST. GFR  (NON AFRICAN AMERICAN): 35 ML/MIN/1.73 M^2
FOLATE SERPL-MCNC: 3.5 NG/ML (ref 4–24)
GLUCOSE SERPL-MCNC: 166 MG/DL (ref 70–110)
HCT VFR BLD AUTO: 19.8 % (ref 40–54)
HGB BLD-MCNC: 6.3 G/DL (ref 14–18)
IMM GRANULOCYTES # BLD AUTO: 0.26 K/UL (ref 0–0.04)
IMM GRANULOCYTES NFR BLD AUTO: 1.9 % (ref 0–0.5)
IRON SERPL-MCNC: 64 UG/DL (ref 45–160)
LYMPHOCYTES # BLD AUTO: 2.3 K/UL (ref 1–4.8)
LYMPHOCYTES NFR BLD: 17.2 % (ref 18–48)
MCH RBC QN AUTO: 30.1 PG (ref 27–31)
MCHC RBC AUTO-ENTMCNC: 31.8 G/DL (ref 32–36)
MCV RBC AUTO: 95 FL (ref 82–98)
MONOCYTES # BLD AUTO: 0.8 K/UL (ref 0.3–1)
MONOCYTES NFR BLD: 6 % (ref 4–15)
NEUTROPHILS # BLD AUTO: 9.9 K/UL (ref 1.8–7.7)
NEUTROPHILS NFR BLD: 74.1 % (ref 38–73)
NRBC BLD-RTO: 1 /100 WBC
PLATELET # BLD AUTO: 153 K/UL (ref 150–350)
PMV BLD AUTO: 11.2 FL (ref 9.2–12.9)
POTASSIUM SERPL-SCNC: 5.1 MMOL/L (ref 3.5–5.1)
PROT SERPL-MCNC: 5 G/DL (ref 6–8.4)
RBC # BLD AUTO: 2.09 M/UL (ref 4.6–6.2)
SATURATED IRON: 20 % (ref 20–50)
SODIUM SERPL-SCNC: 139 MMOL/L (ref 136–145)
TOTAL IRON BINDING CAPACITY: 326 UG/DL (ref 250–450)
TRANS ERYTHROCYTES VOL PATIENT: NORMAL ML
TRANS ERYTHROCYTES VOL PATIENT: NORMAL ML
TRANSFERRIN SERPL-MCNC: 220 MG/DL (ref 200–375)
TRANSFERRIN SERPL-MCNC: 220 MG/DL (ref 200–375)
VIT B12 SERPL-MCNC: 217 PG/ML (ref 210–950)
WBC # BLD AUTO: 13.38 K/UL (ref 3.9–12.7)

## 2020-02-13 PROCEDURE — 99223 1ST HOSP IP/OBS HIGH 75: CPT | Mod: ,,, | Performed by: NURSE PRACTITIONER

## 2020-02-13 PROCEDURE — 63600175 PHARM REV CODE 636 W HCPCS: Performed by: NURSE ANESTHETIST, CERTIFIED REGISTERED

## 2020-02-13 PROCEDURE — P9021 RED BLOOD CELLS UNIT: HCPCS

## 2020-02-13 PROCEDURE — 37000009 HC ANESTHESIA EA ADD 15 MINS: Performed by: INTERNAL MEDICINE

## 2020-02-13 PROCEDURE — 36430 TRANSFUSION BLD/BLD COMPNT: CPT

## 2020-02-13 PROCEDURE — 37000008 HC ANESTHESIA 1ST 15 MINUTES: Performed by: INTERNAL MEDICINE

## 2020-02-13 PROCEDURE — C9113 INJ PANTOPRAZOLE SODIUM, VIA: HCPCS | Performed by: STUDENT IN AN ORGANIZED HEALTH CARE EDUCATION/TRAINING PROGRAM

## 2020-02-13 PROCEDURE — 43255 EGD CONTROL BLEEDING ANY: CPT | Performed by: INTERNAL MEDICINE

## 2020-02-13 PROCEDURE — 36415 COLL VENOUS BLD VENIPUNCTURE: CPT

## 2020-02-13 PROCEDURE — 27200997: Performed by: INTERNAL MEDICINE

## 2020-02-13 PROCEDURE — 85025 COMPLETE CBC W/AUTO DIFF WBC: CPT

## 2020-02-13 PROCEDURE — 80053 COMPREHEN METABOLIC PANEL: CPT

## 2020-02-13 PROCEDURE — 25000003 PHARM REV CODE 250: Performed by: STUDENT IN AN ORGANIZED HEALTH CARE EDUCATION/TRAINING PROGRAM

## 2020-02-13 PROCEDURE — 99223 PR INITIAL HOSPITAL CARE,LEVL III: ICD-10-PCS | Mod: ,,, | Performed by: NURSE PRACTITIONER

## 2020-02-13 PROCEDURE — 63600175 PHARM REV CODE 636 W HCPCS: Performed by: STUDENT IN AN ORGANIZED HEALTH CARE EDUCATION/TRAINING PROGRAM

## 2020-02-13 PROCEDURE — 25000003 PHARM REV CODE 250: Performed by: NURSE ANESTHETIST, CERTIFIED REGISTERED

## 2020-02-13 PROCEDURE — 11000001 HC ACUTE MED/SURG PRIVATE ROOM

## 2020-02-13 RX ORDER — SODIUM CHLORIDE 9 MG/ML
INJECTION, SOLUTION INTRAVENOUS CONTINUOUS PRN
Status: DISCONTINUED | OUTPATIENT
Start: 2020-02-13 | End: 2020-02-13

## 2020-02-13 RX ORDER — LIDOCAINE HCL/PF 100 MG/5ML
SYRINGE (ML) INTRAVENOUS
Status: DISCONTINUED | OUTPATIENT
Start: 2020-02-13 | End: 2020-02-13

## 2020-02-13 RX ORDER — PROPOFOL 10 MG/ML
VIAL (ML) INTRAVENOUS
Status: DISCONTINUED | OUTPATIENT
Start: 2020-02-13 | End: 2020-02-13

## 2020-02-13 RX ADMIN — PANTOPRAZOLE SODIUM 40 MG: 40 INJECTION, POWDER, LYOPHILIZED, FOR SOLUTION INTRAVENOUS at 08:02

## 2020-02-13 RX ADMIN — SODIUM CHLORIDE: 0.9 INJECTION, SOLUTION INTRAVENOUS at 10:02

## 2020-02-13 RX ADMIN — Medication 75 MG: at 10:02

## 2020-02-13 RX ADMIN — CARVEDILOL 3.12 MG: 3.12 TABLET, FILM COATED ORAL at 08:02

## 2020-02-13 RX ADMIN — TOPICAL ANESTHETIC 1 EACH: 200 SPRAY DENTAL; PERIODONTAL at 10:02

## 2020-02-13 RX ADMIN — PROPOFOL 10 MG: 10 INJECTION, EMULSION INTRAVENOUS at 10:02

## 2020-02-13 RX ADMIN — NIFEDIPINE 30 MG: 30 TABLET, FILM COATED, EXTENDED RELEASE ORAL at 08:02

## 2020-02-13 RX ADMIN — LATANOPROST 1 DROP: 50 SOLUTION OPHTHALMIC at 08:02

## 2020-02-13 RX ADMIN — PANTOPRAZOLE SODIUM 40 MG: 40 INJECTION, POWDER, LYOPHILIZED, FOR SOLUTION INTRAVENOUS at 09:02

## 2020-02-13 RX ADMIN — PROPOFOL 40 MG: 10 INJECTION, EMULSION INTRAVENOUS at 10:02

## 2020-02-13 RX ADMIN — BRIMONIDINE TARTRATE 1 DROP: 1.5 SOLUTION OPHTHALMIC at 08:02

## 2020-02-13 RX ADMIN — ALLOPURINOL 100 MG: 100 TABLET ORAL at 08:02

## 2020-02-13 NOTE — SUBJECTIVE & OBJECTIVE
Past Medical History:   Diagnosis Date    Anemia     AVM (arteriovenous malformation) 02/13/2020    CAD (coronary artery disease)     CKD (chronic kidney disease) stage 3, GFR 30-59 ml/min     Edema     Glaucoma     HIV infection     Hyperkalemia     Hyperlipidemia     Hypertension     Hypocalcemia     Renal cyst, acquired, right     Rheumatoid factor positive     Ulcer of esophagus 02/13/2020    Vitamin D deficiency        Past Surgical History:   Procedure Laterality Date    BACK SURGERY      CATARACT EXTRACTION      CORONARY ANGIOPLASTY  06/19/2017    ESOPHAGOGASTRODUODENOSCOPY  02/13/2020    PERCUTANEOUS ENDOVENOUS LASER ABLATION OF PERIPHERAL VEIN N/A 7/17/2019    Procedure: Ablation, Vein, Peripheral, Percutaneous, Endovenous, Using Laser;  Surgeon: Manuel Metzger MD;  Location: [facility] CATH LAB/EP;  Service: Cardiology;  Laterality: N/A;    SPINE SURGERY         Review of patient's allergies indicates:   Allergen Reactions    Lipitor [atorvastatin] Other (See Comments)     myalgia       No current facility-administered medications on file prior to encounter.      Current Outpatient Medications on File Prior to Encounter   Medication Sig    alirocumab (PRALUENT PEN) 150 mg/mL PnIj Inject 1 mL (150 mg total) into the skin every 14 (fourteen) days.    allopurinol (ZYLOPRIM) 100 MG tablet Take 1 tablet (100 mg total) by mouth once daily.    apixaban (ELIQUIS) 5 mg Tab Take 1 tablet (5 mg total) by mouth 2 (two) times daily.    aspirin (ECOTRIN) 81 MG EC tablet Take 81 mg by mouth once daily.    brimonidine 0.1% (ALPHAGAN P) 0.1 % Drop Place 1 drop into both eyes 2 (two) times daily.     carvedilol (COREG) 3.125 MG tablet Take 1 tablet (3.125 mg total) by mouth 2 (two) times daily.    CHOLECALCIFEROL, VITAMIN D3, (VITAMIN D3 ORAL) Take 50,000 Units by mouth once a week.     darunavir ethanolate (PREZISTA) 800 mg Tab Take by mouth once daily.    furosemide (LASIX) 80 MG tablet Take 1  tablet (80 mg total) by mouth daily as needed (for leg swelling).    latanoprost 0.005 % ophthalmic solution 1 drop every evening.    NIFEdipine (PROCARDIA-XL) 30 MG (OSM) 24 hr tablet Take 1 tablet (30 mg total) by mouth once daily. (jazzmine presion) HOLD for BP < 100    raltegravir (ISENTRESS HD) 600 mg tablet Take 600 mg by mouth 2 (two) times daily.     FLUZONE HIGH-DOSE 2019-20, PF, 180 mcg/0.5 mL Syrg      Family History     Problem Relation (Age of Onset)    Drug abuse Son    No Known Problems Sister, Brother, Daughter, Brother, Brother, Daughter        Tobacco Use    Smoking status: Never Smoker    Smokeless tobacco: Never Used   Substance and Sexual Activity    Alcohol use: No    Drug use: No    Sexual activity: Not on file     Review of Systems   Constitution: Positive for malaise/fatigue.   HENT: Negative.    Eyes: Negative.    Cardiovascular: Positive for dyspnea on exertion and leg swelling. Negative for chest pain, irregular heartbeat, near-syncope, orthopnea, palpitations, paroxysmal nocturnal dyspnea and syncope.   Respiratory: Negative.    Endocrine: Negative.    Hematologic/Lymphatic: Negative.    Skin: Negative.    Musculoskeletal: Negative.    Gastrointestinal: Positive for melena. Negative for hematemesis, hematochezia, nausea and vomiting.   Genitourinary: Negative.    Neurological: Negative.    Psychiatric/Behavioral: Negative.    Allergic/Immunologic: Positive for persistent infections.     Objective:     Vital Signs (Most Recent):  Temp: 96.3 °F (35.7 °C) (02/13/20 1234)  Pulse: 69 (02/13/20 1234)  Resp: 20 (02/13/20 1234)  BP: (!) 128/58 (02/13/20 1234)  SpO2: 100 % (02/13/20 1234) Vital Signs (24h Range):  Temp:  [96.3 °F (35.7 °C)-98.4 °F (36.9 °C)] 96.3 °F (35.7 °C)  Pulse:  [63-83] 69  Resp:  [14-22] 20  SpO2:  [98 %-100 %] 100 %  BP: ()/(49-68) 128/58     Weight: 87.8 kg (193 lb 9 oz)  Body mass index is 24.85 kg/m².    SpO2: 100 %  O2 Device (Oxygen Therapy): room  air      Intake/Output Summary (Last 24 hours) at 2/13/2020 1305  Last data filed at 2/13/2020 1300  Gross per 24 hour   Intake 1414.17 ml   Output 1675 ml   Net -260.83 ml       Lines/Drains/Airways     Peripheral Intravenous Line                 Peripheral IV - Single Lumen 02/12/20 1538 20 G Left Antecubital less than 1 day                Physical Exam   Constitutional: He is oriented to person, place, and time. He appears well-developed and well-nourished. No distress.   HENT:   Head: Normocephalic and atraumatic.   Eyes: Right eye exhibits no discharge. Left eye exhibits no discharge.   Neck: No JVD present.   Cardiovascular: Normal rate and regular rhythm. Exam reveals no gallop and no friction rub.   Murmur heard.  Pulmonary/Chest: Effort normal and breath sounds normal.   Abdominal: Soft. Bowel sounds are normal.   Musculoskeletal: He exhibits edema.   Neurological: He is alert and oriented to person, place, and time.   Skin: Skin is warm and dry. He is not diaphoretic.   Psychiatric: He has a normal mood and affect. His behavior is normal. Judgment and thought content normal.       Significant Labs:   BMP:   Recent Labs   Lab 02/12/20  1725 02/12/20 2149 02/13/20  0354   * 179* 166*   * 138 139   K 6.2* 4.8 5.1   * 114* 115*   CO2 15* 16* 17*   BUN 91* 95* 92*   CREATININE 1.74* 1.8* 1.8*   CALCIUM 8.2* 8.1* 8.0*   , CMP   Recent Labs   Lab 02/12/20  1539 02/12/20  1725 02/12/20 2149 02/13/20  0354   * 135* 138 139   K 5.4* 6.2* 4.8 5.1   * 115* 114* 115*   CO2 15* 15* 16* 17*   * 161* 179* 166*   BUN 90* 91* 95* 92*   CREATININE 1.74* 1.74* 1.8* 1.8*   CALCIUM 8.0* 8.2* 8.1* 8.0*   PROT 5.5*  --   --  5.0*   ALBUMIN 2.9*  --   --  2.8*   BILITOT <0.1*  --   --  0.3   ALKPHOS 49  --   --  49*   AST 37  --   --  20   ALT 11  --   --  11   ANIONGAP 6* 5* 8 7*   ESTGFRAFRICA 41.6* 41.6* 40* 40*   EGFRNONAA 36.0* 36.0* 35* 35*   , CBC   Recent Labs   Lab 02/12/20  6715  02/13/20  0354   WBC 13.79* 13.38*   HGB 6.2* 6.3*   HCT 19.6* 19.8*    153   , INR No results for input(s): INR, PROTIME in the last 48 hours., Lipid Panel No results for input(s): CHOL, HDL, LDLCALC, TRIG, CHOLHDL in the last 48 hours., Troponin   Recent Labs   Lab 02/12/20  1539   TROPONINI <0.012    and All pertinent lab results from the last 24 hours have been reviewed.    Significant Imaging: Echocardiogram:   2D echo with color flow doppler:   Results for orders placed or performed during the hospital encounter of 03/28/17   Echo doppler color flow   Result Value Ref Range    QEF 60 55 - 65    Mitral Valve Regurgitation MILD TO MODERATE     Diastolic Dysfunction Yes (A)     Aortic Valve Regurgitation MILD     Est. PA Systolic Pressure 40.95 (A)     Pericardial Effusion NONE     Tricuspid Valve Regurgitation MILD     Narrative    Date of Procedure: 03/28/2017        TEST DESCRIPTION   Technical Quality: This is a technically adequate study. There is poor endocardial definition.     Aorta: The aortic root is normal in size, measuring 2.8 cm at sinotubular junction.     Left Atrium: The left atrial volume index is moderately enlarged, measuring 42.13 cc/m2.     Left Ventricle: The left ventricle is normal in size, with an end-diastolic diameter of 5.7 cm, and an end-systolic diameter of 4.5 cm. LV wall thickness is normal, with the septum measuring 0.9 cm and the posterior wall measuring 1.2 cm across. Relative   wall thickness was normal at 0.42, and the LV mass index was increased at 132.4 g/m2 consistent with eccentric left ventricular hypertrophy. Global left ventricular systolic function appears normal. Visually estimated ejection fraction is 60-65%. The LV   Doppler derived stroke volume equals 123.0 ccs.   The E/e'(lat) is 13, consistent with significant diastolic dysfunction.     Right Atrium: The right atrium is normal in size, measuring 4.5 cm in length in the apical view.     Right Ventricle:  The right ventricle is normal in size measuring 4.1 cm at the base in the apical right ventricle-focused view. Global right ventricular systolic function appears normal. Tricuspid annular plane systolic excursion (TAPSE) is 2.1 cm. The   estimated PA systolic pressure is 41 mmHg.     Aortic Valve:  The aortic valve is moderately sclerotic with normal leaflet mobility. The aortic valve is tri-leaflet in structure. Additionally, there is mild aortic regurgitation.     Mitral Valve:  The mitral valve is normal in structure. There is mild to moderate mitral regurgitation.     Tricuspid Valve:  The tricuspid valve is normal in structure. There is mild tricuspid regurgitation.     Pulmonary Valve:  The pulmonic valve is normal in structure. There is trivial pulmonic regurgitation.     Pericardium: There is no evidence of pericardial effusion.      IVC: IVC is normal in size and collapses > 50% with a sniff, suggesting normal right atrial pressure of 3 mmHg.     Intracavitary: There is no evidence of intracavity mass, thrombi, or vegetation.         CONCLUSIONS     1 - Normal left ventricular systolic function (EF 60-65%).     2 - Eccentric hypertrophy.     3 - Moderate left atrial enlargement.     4 - Left ventricular diastolic dysfunction - grade 2.     5 - Normal right ventricular systolic function .     6 - Pulmonary hypertension. The estimated PA systolic pressure is 41 mmHg.     7 - Mild aortic regurgitation.     8 - Mild to moderate mitral regurgitation.             This document has been electronically    SIGNED BY: Gene Ramos MD On: 03/28/2017 10:53    and Transthoracic echo (TTE) complete (Cupid Only): No results found for this or any previous visit.

## 2020-02-13 NOTE — PLAN OF CARE
Pt oriented. Demographics verified. Next of Kin verifed with pt/family. No DME needs reported from pt. Pt independent and still drives. Will have transpo from daughter. PCP verified.  Plans to return home. This  put name on white board and explained blue discharge folder to patient. Discharge planning brochure and/or business card given to patient.  Patient verbalized understanding.    Future Appointments   Date Time Provider Department Center   3/18/2020  9:00 AM Myah Perry MD [facility]   3/25/2020 10:00 AM Manuel Metzger MD [facility] CARDIO [place]   4/28/2020  9:40 AM Edward Wilkerson MD [facility]        02/13/20 3594   Discharge Assessment   Assessment Type Discharge Planning Assessment   Confirmed/corrected address and phone number on facesheet? Yes   Assessment information obtained from? Patient   Prior to hospitilization cognitive status: Alert/Oriented;No Deficits   Prior to hospitalization functional status: Independent;Assistive Equipment   Current cognitive status: Alert/Oriented;No Deficits   Current Functional Status: Independent   Lives With spouse   Able to Return to Prior Arrangements yes   Is patient able to care for self after discharge? Yes   Who are your caregiver(s) and their phone number(s)? Lori Boggs [phone]   Patient's perception of discharge disposition admitted as an inpatient   Readmission Within the Last 30 Days no previous admission in last 30 days   Patient currently being followed by outpatient case management? No   Patient currently receives any other outside agency services? No   Equipment Currently Used at Home cane, quad;grab bar;bath bench  (blood pressure cuff)   Do you have any problems affording any of your prescribed medications? No   Is the patient taking medications as prescribed? yes   Does the patient have transportation home? Yes   Transportation Anticipated family or friend will provide   Does the patient receive services at the  [facility]? No   Discharge Plan A Home   DME Needed Upon Discharge  none   Patient/Family in Agreement with Plan yes     Jessica Woodruff RN  RN Transition Navigator  [phone]

## 2020-02-13 NOTE — NURSING
Lab called with critical H/H 6.3/19.8 from previous 6.2/19.6. MD paged, on return called notified. Stated he will put 2nd transfusion orders in, will not recollect. Pt denies any complaints.

## 2020-02-13 NOTE — ASSESSMENT & PLAN NOTE
s/p PCI LAD, LCX, and RCA (2017)-CTS turned down for CABG  Continue asa for SAPT, BB, Praluent; no ACEI/ARB given renal function   No cardiac complaints  Troponin negative

## 2020-02-13 NOTE — ANESTHESIA PAT ROS NOTE
Pt informed of clear liquid diet and NPO status after MN. Pt AAO x3 voice complete understanding, stated he will comply.

## 2020-02-13 NOTE — PROGRESS NOTES
"[facility] IM Resident HOChristenI Progress Note    Subjective:     Yesterday evening a MET was called overhead because the patient was lightheaded when he was walking to bathroom with the sitter. Patient denies any chest pain, jaw or arm pain, vision changes, nausea, or sense of impending doom.        He received 1 unit of blood and his repeat H/H was 6.3 (previously 6.2), 1 more unit was ordered and transfused. Patient reports he is feeling stronger but has not gotten up out of bed yet to see if he has SOB with exertion.     Objective:   Last 24 Hour Vital Signs:  BP  Min: 103/51  Max: 151/68  Temp  Av °F (36.7 °C)  Min: 97.2 °F (36.2 °C)  Max: 98.4 °F (36.9 °C)  Pulse  Av.1  Min: 68  Max: 83  Resp  Av.3  Min: 14  Max: 22  SpO2  Av.8 %  Min: 98 %  Max: 100 %  Height  Av' 2" (188 cm)  Min: 6' 2" (188 cm)  Max: 6' 2" (188 cm)  Weight  Av.2 kg (198 lb 12.5 oz)  Min: 87.8 kg (193 lb 9 oz)  Max: 92.5 kg (204 lb)  I/O last 3 completed shifts:  In: 1339.2 [Blood:1339.2]  Out: 1675 [Urine:1675]    Physical Examination:  General:          Alert and awake in NAD  Head:               Normocephalic and atraumatic  Eyes:               PERRL; EOMi with anicteric sclera and clear conjunctivae  Mouth:             Oropharynx clear and without exudate; moist mucous membranes  Cardio:             Regular rate and rhythm with normal S1 and S2; no murmurs or rubs  Resp:              breaths unlabored; no wheezes, crackles or rhonchi  Abdom:            Soft, NTND with normoactive bowel sounds  Rectal:            Deferred because was done in the [facility] ED.  Extrem:            WWP with no clubbing, cyanosis or edema  Skin:                No rashes, lesions, or color changes  Pulses:            2+ and symmetric distally  Neuro:             AAOx3; cooperative and pleasant with no focal deficits    Laboratory:  Laboratory Data Reviewed: yes  Pertinent Findings:  Recent Labs   Lab 20  1539 20  1725 " 02/12/20  2149 02/13/20  0354   WBC 13.79*  --   --  13.38*   HGB 6.2*  --   --  6.3*   HCT 19.6*  --   --  19.8*     --   --  153   *  --   --  95   RDW 14.4  --   --  17.4*   * 135* 138 139   K 5.4* 6.2* 4.8 5.1   * 115* 114* 115*   CO2 15* 15* 16* 17*   BUN 90* 91* 95* 92*   CREATININE 1.74* 1.74* 1.8* 1.8*   * 161* 179* 166*   PROT 5.5*  --   --  5.0*   ALBUMIN 2.9*  --   --  2.8*   BILITOT <0.1*  --   --  0.3   AST 37  --   --  20   ALKPHOS 49  --   --  49*   ALT 11  --   --  11           Other Results:  EKG (my interpretation): ): nml Sinus Rhythm with no apparent ST segment changes        Current Medications:     Infusions:       Scheduled:   allopurinoL  100 mg Oral Daily    brimonidine 0.15 % OPTH DROP  1 drop Both Eyes BID    carvediloL  3.125 mg Oral BID    latanoprost  1 drop Both Eyes QHS    NIFEdipine  30 mg Oral Daily    NON FORMULARY MEDICATION 800 mg  800 mg Oral Daily    pantoprazole  40 mg Intravenous BID    raltegravir  800 mg Oral BID        PRN:  sodium chloride        Lines and Day Number of Therapy:  2 PIV placed on 2/12, one in right and left forearm accordingly.     Assessment:     Osorio Boggs is a 79 y.o.male with  Patient Active Problem List    Diagnosis Date Noted    Upper GI bleed 02/12/2020    Symptomatic anemia 02/12/2020    Hyperkalemia 02/12/2020    Hyperglycemia 12/03/2019    Chronic deep vein thrombosis (DVT) of popliteal vein of left lower extremity 06/05/2019    HIV infection, asymptomatic 02/05/2019    Chronic kidney disease, stage 3 02/05/2019    Venous insufficiency of both lower extremities 04/25/2018    Statin intolerance 10/03/2017    Coronary artery disease involving native coronary artery of native heart without angina pectoris 07/06/2017    PAD (peripheral artery disease) 02/09/2017    Hypertension 02/08/2017    Hyperlipidemia 02/08/2017    Glaucoma 09/17/2015        Plan:   Acute blood loss  Anemia 2/2 upper GI  bleed w/ Melena  - four day history of exertional dyspnea  - black dark stools  - H/H on presentation 6.2/19.6,   - BUN 90 on presentation: consistent with upper GI bleed  - On home Eliquis     Protonix 40mg BID  -Rectal positive for Melena and stool occult blood positive  - GI consult placed: NPO: rec  - 1 unit transuded and patients Hgb bart to 6.3, second unit was rodered and transfused.  -GI plans to scope patient today.        Hyperkalemia  -potassium of 5.4 on presentation, bart to 6.2  -patient shifted: provided calcium gluconate, albuterol, sodium bicar in RVP  -K downtrended to 4.8 and now 5.1     Hyponatremia   -sodium of 133 on presentation   -Sodium now 139     Hyperglycemia   -CMP glucose of 159 on presentation   - Is now 166  -A1c was 6.0     Chronic Kidney Disease stage 3  -Creatinine 1.74 eGFR41 on presentation  -Creatine from 1/20 1.64, 2.0 from 12/19   -Cr 1.8 and eGFR 40      Code: Full  DVT: SCDs  Diet: NPO  Dispo: Pending possible scope by GI, they have been consulted.      Luc Scott  [facility] HO-I  [facility] IM Service Team A    \[facility]\"" Medicine Hospitalist Pager numbers:   [facility] Hospitalist Medicine Team A (Samira/Juan): [phone]  [facility] Hospitalist Medicine Team B (Reg/Eduar):  [phone]

## 2020-02-13 NOTE — PLAN OF CARE
VN note: VN cued into patient's room for introduction. VN informed patient that VN would be working closely along side bedside nurse, PCT, and the rest of the care team and making rounds throughout the shift. Endo tech in room assisting patient to stretcher for EGD. Allowed time for questions. VN will continue to be available to patient and intervene prn.

## 2020-02-13 NOTE — NURSING
Ordered 1 unit PRBC completed. Tolerated well. No reaction. See flowsheet for VS. Pt wide awake, denies any complaints. Appears happy.

## 2020-02-13 NOTE — HPI
Osorio Boggs is a 79 y.o. male who with Anemia, CAD, CKD, Edema, Glaucoma, HIV infection, Hyperkalemia, Hyperlipidemia, Hypertension, Hypocalcemia, DVT, venous reflux. Patient presented on 2/12/2020 with a primary complaint of Melena, fatigue, and SOB. He also reports lightheadedness when he stands up to fast but resolves once he stands for several seconds.  He doesn't feel as if he has as much energy as he normally does. He denies any chest pain, diaphoresis, syncope, increased swelling to BLE, or leg pain. Patient was noted to have a Hgb of 6 on arrival. EGD revealed gastritis and a esophageal ulcer which was treated with a clip. Cardiology consulted for DVT with GIB on Eliquis.   GI recommended holding Eliquis for 10 days and repeating EGD in 6-8 weeks.   Last positive DVT study was in 04/2018 with SFV DVT.   He is s/p R EIV and L CIV + EIV venous intervention for venous insufficiency, EVLT of L GSV for refractory reflux complicated with edema

## 2020-02-13 NOTE — CONSULTS
[facility]  Cardiology  Consult Note    Patient Name: Osorio Boggs  MRN: [id number]  Admission Date: 2/12/2020  Hospital Length of Stay: 1 days  Code Status: No Order   Attending Provider: Trista Martinez MD   Consulting Provider: Mikael Sotelo NP  Primary Care Physician: Edward Wilkerson MD  Principal Problem:Upper GI bleed    Patient information was obtained from patient, past medical records and ER records.     Inpatient consult to [facility]  Consult performed by: Mikael Sotelo NP  Consult ordered by: Matthew Smiley MD        Subjective:     Chief Complaint:  Fatigue, melena      HPI:   Osorio Boggs is a 79 y.o. male who  with Anemia, CAD, CKD, Edema, Glaucoma, HIV infection, Hyperkalemia, Hyperlipidemia, Hypertension, Hypocalcemia, DVT, venous reflux. Patient presented on 2/12/2020 with a primary complaint of Melena, fatigue, and SOB. He also reports lightheadedness when he stands up to fast but resolves once he stands for several seconds.  He doesn't feel as if he has as much energy as he normally does. He denies any chest pain, diaphoresis, syncope, increased swelling to BLE, or leg pain. Patient was noted to have a Hgb of 6 on arrival. EGD revealed gastritis and a esophageal ulcer which was treated with a clip. Cardiology consulted for DVT with GIB on Eliquis.   GI recommended holding Eliquis for 10 days and repeating EGD in 6-8 weeks.   Last positive DVT study was in 04/2018 with SFV DVT.   He is s/p R EIV and L CIV + EIV venous intervention for venous insufficiency, EVLT of L GSV for refractory reflux complicated with edema    Past Medical History:   Diagnosis Date    Anemia     AVM (arteriovenous malformation) 02/13/2020    CAD (coronary artery disease)     CKD (chronic kidney disease) stage 3, GFR 30-59 ml/min     Edema     Glaucoma     HIV infection     Hyperkalemia     Hyperlipidemia     Hypertension     Hypocalcemia     Renal cyst,  acquired, right     Rheumatoid factor positive     Ulcer of esophagus 02/13/2020    Vitamin D deficiency        Past Surgical History:   Procedure Laterality Date    BACK SURGERY      CATARACT EXTRACTION      CORONARY ANGIOPLASTY  06/19/2017    ESOPHAGOGASTRODUODENOSCOPY  02/13/2020    PERCUTANEOUS ENDOVENOUS LASER ABLATION OF PERIPHERAL VEIN N/A 7/17/2019    Procedure: Ablation, Vein, Peripheral, Percutaneous, Endovenous, Using Laser;  Surgeon: Manuel Metzger MD;  Location: [facility] CATH LAB/EP;  Service: Cardiology;  Laterality: N/A;    SPINE SURGERY         Review of patient's allergies indicates:   Allergen Reactions    Lipitor [atorvastatin] Other (See Comments)     myalgia       No current facility-administered medications on file prior to encounter.      Current Outpatient Medications on File Prior to Encounter   Medication Sig    alirocumab (PRALUENT PEN) 150 mg/mL PnIj Inject 1 mL (150 mg total) into the skin every 14 (fourteen) days.    allopurinol (ZYLOPRIM) 100 MG tablet Take 1 tablet (100 mg total) by mouth once daily.    apixaban (ELIQUIS) 5 mg Tab Take 1 tablet (5 mg total) by mouth 2 (two) times daily.    aspirin (ECOTRIN) 81 MG EC tablet Take 81 mg by mouth once daily.    brimonidine 0.1% (ALPHAGAN P) 0.1 % Drop Place 1 drop into both eyes 2 (two) times daily.     carvedilol (COREG) 3.125 MG tablet Take 1 tablet (3.125 mg total) by mouth 2 (two) times daily.    CHOLECALCIFEROL, VITAMIN D3, (VITAMIN D3 ORAL) Take 50,000 Units by mouth once a week.     darunavir ethanolate (PREZISTA) 800 mg Tab Take by mouth once daily.    furosemide (LASIX) 80 MG tablet Take 1 tablet (80 mg total) by mouth daily as needed (for leg swelling).    latanoprost 0.005 % ophthalmic solution 1 drop every evening.    NIFEdipine (PROCARDIA-XL) 30 MG (OSM) 24 hr tablet Take 1 tablet (30 mg total) by mouth once daily. (jazzmine presion) HOLD for BP < 100    raltegravir (ISENTRESS HD) 600 mg tablet Take 600 mg by  mouth 2 (two) times daily.     FLUZONE HIGH-DOSE 2019-20, PF, 180 mcg/0.5 mL Syrg      Family History     Problem Relation (Age of Onset)    Drug abuse Son    No Known Problems Sister, Brother, Daughter, Brother, Brother, Daughter        Tobacco Use    Smoking status: Never Smoker    Smokeless tobacco: Never Used   Substance and Sexual Activity    Alcohol use: No    Drug use: No    Sexual activity: Not on file     Review of Systems   Constitution: Positive for malaise/fatigue.   HENT: Negative.    Eyes: Negative.    Cardiovascular: Positive for dyspnea on exertion and leg swelling. Negative for chest pain, irregular heartbeat, near-syncope, orthopnea, palpitations, paroxysmal nocturnal dyspnea and syncope.   Respiratory: Negative.    Endocrine: Negative.    Hematologic/Lymphatic: Negative.    Skin: Negative.    Musculoskeletal: Negative.    Gastrointestinal: Positive for melena. Negative for hematemesis, hematochezia, nausea and vomiting.   Genitourinary: Negative.    Neurological: Negative.    Psychiatric/Behavioral: Negative.    Allergic/Immunologic: Positive for persistent infections.     Objective:     Vital Signs (Most Recent):  Temp: 96.3 °F (35.7 °C) (02/13/20 1234)  Pulse: 69 (02/13/20 1234)  Resp: 20 (02/13/20 1234)  BP: (!) 128/58 (02/13/20 1234)  SpO2: 100 % (02/13/20 1234) Vital Signs (24h Range):  Temp:  [96.3 °F (35.7 °C)-98.4 °F (36.9 °C)] 96.3 °F (35.7 °C)  Pulse:  [63-83] 69  Resp:  [14-22] 20  SpO2:  [98 %-100 %] 100 %  BP: ()/(49-68) 128/58     Weight: 87.8 kg (193 lb 9 oz)  Body mass index is 24.85 kg/m².    SpO2: 100 %  O2 Device (Oxygen Therapy): room air      Intake/Output Summary (Last 24 hours) at 2/13/2020 1305  Last data filed at 2/13/2020 1300  Gross per 24 hour   Intake 1414.17 ml   Output 1675 ml   Net -260.83 ml       Lines/Drains/Airways     Peripheral Intravenous Line                 Peripheral IV - Single Lumen 02/12/20 1538 20 G Left Antecubital less than 1 day                 Physical Exam   Constitutional: He is oriented to person, place, and time. He appears well-developed and well-nourished. No distress.   HENT:   Head: Normocephalic and atraumatic.   Eyes: Right eye exhibits no discharge. Left eye exhibits no discharge.   Neck: No JVD present.   Cardiovascular: Normal rate and regular rhythm. Exam reveals no gallop and no friction rub.   Murmur heard.  Pulmonary/Chest: Effort normal and breath sounds normal.   Abdominal: Soft. Bowel sounds are normal.   Musculoskeletal: He exhibits edema.   Neurological: He is alert and oriented to person, place, and time.   Skin: Skin is warm and dry. He is not diaphoretic.   Psychiatric: He has a normal mood and affect. His behavior is normal. Judgment and thought content normal.       Significant Labs:   BMP:   Recent Labs   Lab 02/12/20 1725 02/12/20 2149 02/13/20  0354   * 179* 166*   * 138 139   K 6.2* 4.8 5.1   * 114* 115*   CO2 15* 16* 17*   BUN 91* 95* 92*   CREATININE 1.74* 1.8* 1.8*   CALCIUM 8.2* 8.1* 8.0*   , CMP   Recent Labs   Lab 02/12/20  1539 02/12/20  1725 02/12/20 2149 02/13/20  0354   * 135* 138 139   K 5.4* 6.2* 4.8 5.1   * 115* 114* 115*   CO2 15* 15* 16* 17*   * 161* 179* 166*   BUN 90* 91* 95* 92*   CREATININE 1.74* 1.74* 1.8* 1.8*   CALCIUM 8.0* 8.2* 8.1* 8.0*   PROT 5.5*  --   --  5.0*   ALBUMIN 2.9*  --   --  2.8*   BILITOT <0.1*  --   --  0.3   ALKPHOS 49  --   --  49*   AST 37  --   --  20   ALT 11  --   --  11   ANIONGAP 6* 5* 8 7*   ESTGFRAFRICA 41.6* 41.6* 40* 40*   EGFRNONAA 36.0* 36.0* 35* 35*   , CBC   Recent Labs   Lab 02/12/20  1539 02/13/20  0354   WBC 13.79* 13.38*   HGB 6.2* 6.3*   HCT 19.6* 19.8*    153   , INR No results for input(s): INR, PROTIME in the last 48 hours., Lipid Panel No results for input(s): CHOL, HDL, LDLCALC, TRIG, CHOLHDL in the last 48 hours., Troponin   Recent Labs   Lab 02/12/20  1539   TROPONINI <0.012    and All pertinent lab  results from the last 24 hours have been reviewed.    Significant Imaging: Echocardiogram:   2D echo with color flow doppler:   Results for orders placed or performed during the hospital encounter of 03/28/17   Echo doppler color flow   Result Value Ref Range    QEF 60 55 - 65    Mitral Valve Regurgitation MILD TO MODERATE     Diastolic Dysfunction Yes (A)     Aortic Valve Regurgitation MILD     Est. PA Systolic Pressure 40.95 (A)     Pericardial Effusion NONE     Tricuspid Valve Regurgitation MILD     Narrative    Date of Procedure: 03/28/2017        TEST DESCRIPTION   Technical Quality: This is a technically adequate study. There is poor endocardial definition.     Aorta: The aortic root is normal in size, measuring 2.8 cm at sinotubular junction.     Left Atrium: The left atrial volume index is moderately enlarged, measuring 42.13 cc/m2.     Left Ventricle: The left ventricle is normal in size, with an end-diastolic diameter of 5.7 cm, and an end-systolic diameter of 4.5 cm. LV wall thickness is normal, with the septum measuring 0.9 cm and the posterior wall measuring 1.2 cm across. Relative   wall thickness was normal at 0.42, and the LV mass index was increased at 132.4 g/m2 consistent with eccentric left ventricular hypertrophy. Global left ventricular systolic function appears normal. Visually estimated ejection fraction is 60-65%. The LV   Doppler derived stroke volume equals 123.0 ccs.   The E/e'(lat) is 13, consistent with significant diastolic dysfunction.     Right Atrium: The right atrium is normal in size, measuring 4.5 cm in length in the apical view.     Right Ventricle: The right ventricle is normal in size measuring 4.1 cm at the base in the apical right ventricle-focused view. Global right ventricular systolic function appears normal. Tricuspid annular plane systolic excursion (TAPSE) is 2.1 cm. The   estimated PA systolic pressure is 41 mmHg.     Aortic Valve:  The aortic valve is moderately  sclerotic with normal leaflet mobility. The aortic valve is tri-leaflet in structure. Additionally, there is mild aortic regurgitation.     Mitral Valve:  The mitral valve is normal in structure. There is mild to moderate mitral regurgitation.     Tricuspid Valve:  The tricuspid valve is normal in structure. There is mild tricuspid regurgitation.     Pulmonary Valve:  The pulmonic valve is normal in structure. There is trivial pulmonic regurgitation.     Pericardium: There is no evidence of pericardial effusion.      IVC: IVC is normal in size and collapses > 50% with a sniff, suggesting normal right atrial pressure of 3 mmHg.     Intracavitary: There is no evidence of intracavity mass, thrombi, or vegetation.         CONCLUSIONS     1 - Normal left ventricular systolic function (EF 60-65%).     2 - Eccentric hypertrophy.     3 - Moderate left atrial enlargement.     4 - Left ventricular diastolic dysfunction - grade 2.     5 - Normal right ventricular systolic function .     6 - Pulmonary hypertension. The estimated PA systolic pressure is 41 mmHg.     7 - Mild aortic regurgitation.     8 - Mild to moderate mitral regurgitation.             This document has been electronically    SIGNED BY: Gene Ramos MD On: 03/28/2017 10:53    and Transthoracic echo (TTE) complete (Cupid Only): No results found for this or any previous visit.    Assessment and Plan:     * Upper GI bleed  GI following   Ulcer noted on EGD with gastritis  Hgb 6.3, transfuse   Hold Eliquis     Chronic deep vein thrombosis (DVT) of popliteal vein of left lower extremity  Repeat US of BLE to evaluate for DVT  GIB with Hgb 6 on Eliquis.   Last positive DVT study was in 04/2018 with SFV DVT   EGD revealed gastritis and a esophageal ulcer which was treated with a clip    Hold Eliquis as recommended by GI and resume when safe from GI perspective     Coronary artery disease involving native coronary artery of native heart without angina pectoris  s/p  PCI LAD, LCX, and RCA (2017)-CTS turned down for CABG  Continue asa for SAPT, BB, Praluent; no ACEI/ARB given renal function   No cardiac complaints  Troponin negative      PAD (peripheral artery disease)  Stable   Continue asa, Praluent     Hyperlipidemia  Intolerant to statins  Continue Praluent as outpatient     Hypertension  SBP 110s-120s  Continue BB, CCB         VTE Risk Mitigation (From admission, onward)         Ordered     Place sequential compression device  Until discontinued      02/12/20 1075                Thank you for your consult. I will follow-up with patient. Please contact us if you have any additional questions.    Mikael Sotelo, HENRIETTA  Cardiology   [facility]

## 2020-02-13 NOTE — H&P
"U Internal Medicine History and Physical - Resident Note    Admitting Team: Medicine Team A   Attending Physician: Juan   Resident: Mary   Interns: Young    Date of Admit: 2/12/2020    Chief Complaint     Lethargy and SOB x 4 days    Subjective:      History of Present Illness:  Osorio Boggs is a 79 y.o. male who  has a past medical history of Anemia, CAD (coronary artery disease), CKD (chronic kidney disease) stage 3, GFR 30-59 ml/min, Edema, Glaucoma, HIV infection, Hyperkalemia, Hyperlipidemia, Hypertension, Hypocalcemia, Renal cyst, acquired, right, Rheumatoid factor positive, and Vitamin D deficiency.. The patient presented to [facility] on 2/12/2020 with a primary complaint of Melena (Pt states started with black stool 2-3 days ago.  Pt denies foul odor to stool.  Pt denies any rectal pain.  Pt states "gets tired" frequently.  Pt states left hip painful with ambulation.  )    Patient reports that he started to feel SOB when walking short distances about 4 days ago. The patient reports the Shortness of breath is with exertion but even mild exertion causes him to be winded.He also reports lightheadedness when he stands up to fast but resolves once he stands for several seconds.  He also feels like he doesn't have as much energy as he normally does, feels like he is getting tired easier. He also reports over the past couple days he has started to notice several episodes of dark colored stools. He denies any bright red blood in stools or per rectum, his BMs have been normal in consistency and frequency. He denies any chest pain, L arm pain, vision changes, SOB at rest, nausea, vomiting, fever, or chills.    He was at [facility] ED, where he was found to be anemic with Hgb of 6.2 GI was consulted and instructed the patient to be transferred here for EGD tomorrow AM. The patient was consented for blood and placed on IV PPI.          Past Medical History:  Past Medical History: "   Diagnosis Date    Anemia     CAD (coronary artery disease)     CKD (chronic kidney disease) stage 3, GFR 30-59 ml/min     Edema     Glaucoma     HIV infection     Hyperkalemia     Hyperlipidemia     Hypertension     Hypocalcemia     Renal cyst, acquired, right     Rheumatoid factor positive     Vitamin D deficiency        Past Surgical History:  Past Surgical History:   Procedure Laterality Date    BACK SURGERY      CATARACT EXTRACTION      CORONARY ANGIOPLASTY  06/19/2017    PERCUTANEOUS ENDOVENOUS LASER ABLATION OF PERIPHERAL VEIN N/A 7/17/2019    Procedure: Ablation, Vein, Peripheral, Percutaneous, Endovenous, Using Laser;  Surgeon: Manuel Metzger MD;  Location: [facility]ATH LAB/EP;  Service: Cardiology;  Laterality: N/A;    SPINE SURGERY         Allergies:  Review of patient's allergies indicates:   Allergen Reactions    Lipitor [atorvastatin] Other (See Comments)     myalgia       Home Medications:  Prior to Admission medications    Medication Sig Start Date End Date Taking? Authorizing Provider   alirocumab (PRALUENT PEN) 150 mg/mL PnIj Inject 1 mL (150 mg total) into the skin every 14 (fourteen) days. 1/28/20   Eric Hager MD   allopurinol (ZYLOPRIM) 100 MG tablet Take 1 tablet (100 mg total) by mouth once daily. 5/29/19   Myah Perry MD   apixaban (ELIQUIS) 5 mg Tab Take 1 tablet (5 mg total) by mouth 2 (two) times daily. 10/2/19   Mayh Perry MD   aspirin (ECOTRIN) 81 MG EC tablet Take 81 mg by mouth once daily.    Historical Provider, MD   brimonidine 0.1% (ALPHAGAN P) 0.1 % Drop Place 1 drop into both eyes 2 (two) times daily.     Historical Provider, MD   carvedilol (COREG) 3.125 MG tablet Take 1 tablet (3.125 mg total) by mouth 2 (two) times daily. 1/28/20 7/26/20  Edward Wilkerson MD   CHOLECALCIFEROL, VITAMIN D3, (VITAMIN D3 ORAL) Take 50,000 Units by mouth once a week.     Historical Provider, MD   darunavir-gustavo-emtri-tenof ala (SYMTUZA) [phone] mg  "Tab Take by mouth once daily.    Historical Provider, MD   efavirenz (SUSTIVA) 600 mg Tab  1/10/20   Historical Provider, MD   FLUZONE HIGH-DOSE -, PF, 180 mcg/0.5 mL Syrg  9/3/19   Historical Provider, MD   furosemide (LASIX) 80 MG tablet Take 1 tablet (80 mg total) by mouth daily as needed (for leg swelling). 19  Myah Perry MD   latanoprost 0.005 % ophthalmic solution 1 drop every evening.    Historical Provider, MD   NIFEdipine (PROCARDIA-XL) 30 MG (OSM) 24 hr tablet Take 1 tablet (30 mg total) by mouth once daily. (jazzmine presion) HOLD for BP < 100 19  Edward Wilkerson MD       Family History:  Family History   Problem Relation Age of Onset    No Known Problems Sister     No Known Problems Brother     No Known Problems Daughter     Drug abuse Son     No Known Problems Brother     No Known Problems Brother     No Known Problems Daughter        Social History:  Social History     Tobacco Use    Smoking status: Never Smoker    Smokeless tobacco: Never Used   Substance Use Topics    Alcohol use: No    Drug use: No       Review of Systems:  Pertinent positives and negatives are listed in HPI.  All other systems are reviewed and are negative.    Health Maintaince :   Primary Care Physician: Bob  Immunizations:   TDap UTD  Influenza  UTD  Pneumovax UTD  Cancer Screening:  Colonoscopy: last one 4 years ago, needs 1 in a year according to patient.     Objective:   Last 24 Hour Vital Signs:  BP  Min: 120/65  Max: 151/68  Temp  Av.4 °F (36.9 °C)  Min: 98.3 °F (36.8 °C)  Max: 98.4 °F (36.9 °C)  Pulse  Av.7  Min: 68  Max: 80  Resp  Av.8  Min: 14  Max: 20  SpO2  Av.8 %  Min: 98 %  Max: 100 %  Height  Av' 2" (188 cm)  Min: 6' 2" (188 cm)  Max: 6' 2" (188 cm)  Weight  Av.5 kg (204 lb)  Min: 92.5 kg (204 lb)  Max: 92.5 kg (204 lb)  Body mass index is 26.19 kg/m².  I/O last 3 completed shifts:  In: -   Out: 800 [Urine:800]    Physical " Examination:  General: Alert and awake in NAD  Head:  Normocephalic and atraumatic  Eyes:  PERRL; EOMi with anicteric sclera and clear conjunctivae  Mouth:  Oropharynx clear and without exudate; moist mucous membranes  Cardio:  Regular rate and rhythm with normal S1 and S2; no murmurs or rubs  Resp:  unlabored; no wheezes, crackles or rhonchi  Abdom: Soft, NTND with normoactive bowel sounds  Rectal:            Deferred because was done in the [facility] ED.  Extrem: no clubbing, cyanosis or edema  Skin:  No rashes, lesions, or color changes  Pulses: 2+ and symmetric distally  Neuro:  AAOx3; cooperative and pleasant with no focal deficits    Laboratory:  Most Recent Data:  CBC:   Lab Results   Component Value Date    WBC 13.79 (H) 02/12/2020    HGB 6.2 (L) 02/12/2020    HCT 19.6 (LL) 02/12/2020     02/12/2020     (H) 02/12/2020    RDW 14.4 02/12/2020     BMP:   Lab Results   Component Value Date     02/12/2020    K 4.8 02/12/2020     (H) 02/12/2020    CO2 16 (L) 02/12/2020    BUN 95 (H) 02/12/2020    CREATININE 1.8 (H) 02/12/2020     (H) 02/12/2020    CALCIUM 8.1 (L) 02/12/2020     LFTs:   Lab Results   Component Value Date    PROT 5.5 (L) 02/12/2020    ALBUMIN 2.9 (L) 02/12/2020    BILITOT <0.1 (A) 02/12/2020    AST 37 02/12/2020    ALKPHOS 49 02/12/2020    ALT 11 02/12/2020     Coags:   Lab Results   Component Value Date    INR 1.0 05/11/2018     Urinalysis:   Lab Results   Component Value Date    COLORU YELLOW 12/02/2019    SPECGRAV 1.018 12/02/2019    NITRITE NEGATIVE 12/02/2019    GLUCOSEU NEGATIVE 12/02/2019    KETONESU NEGATIVE 12/02/2019    UROBILINOGEN NEG 03/29/2011    BILIRUBINUR NEGATIVE 12/02/2019    WBCUA 35-45 (A) 03/29/2011       Trended Lab Data:  Recent Labs   Lab 02/12/20  1539 02/12/20  1725 02/12/20  2149   WBC 13.79*  --   --    HGB 6.2*  --   --    HCT 19.6*  --   --      --   --    *  --   --    RDW 14.4  --   --    * 135* 138   K 5.4*  6.2* 4.8   * 115* 114*   CO2 15* 15* 16*   BUN 90* 91* 95*   CREATININE 1.74* 1.74* 1.8*   * 161* 179*   PROT 5.5*  --   --    ALBUMIN 2.9*  --   --    BILITOT <0.1*  --   --    AST 37  --   --    ALKPHOS 49  --   --    ALT 11  --   --        Trended Cardiac Data:  Recent Labs   Lab 02/12/20  1539   TROPONINI <0.012           Other Results:  EKG (my interpretation): nml Sinus Rhythm with no apparent ST segment changes      Radiology:  Imaging Results          X-Ray Chest AP Portable (Final result)  Result time 02/12/20 16:45:45    Final result by Luis Manuel Gallardo III, MD (02/12/20 16:45:45)                 Impression:      Negative one view chest x-ray.      Electronically signed by: Luis Manuel Gallardo MD  Date:    02/12/2020  Time:    16:45             Narrative:    EXAMINATION:  XR CHEST AP PORTABLE    CLINICAL HISTORY:  Shortness of breath    COMPARISON:  Two thousand nineteen    FINDINGS:  Heart size is normal. The lung fields are clear. No acute pulmonary infiltrate.                                   Assessment:     Osorio Boggs is a 79 y.o. male with:     Plan:   Acute blood loss  Anemia 2/2 upper GI bleed w/ Melena  - four day history of exertional dyspnea  - black dark stools  - H/H on presentation 6.2/19.6,   - H/H from 1/20 13/38  - BUN 90 on presentation: consistent with upper GI bleed  - On home Eliquis   - Protonix 80mg x 1 in ED: to resume with     Protonix 40mg BID  -stool occult blood positive  - GI consult placed: NPO: rec  - plans for type and screen with consent:  will prepare 2 units for RBC prepared, will transfuse 1 and recheck H/H in AM.      Hyperkalemia  -potassium of 5.4 on presentation  -repeat potassium 6.2   -patient shifted: provided calcium gluconate, albuterol, sodium bicar in RVP  -will trend K     Hyponatremia   -sodium of 133 on presentation     Hyperglycemia   -CMP glucose of 159 on presentation     Chronic Kidney Disease stage 3  -BUN 91   -Creatinine  1.74  -Creatine from 1/20 1.64, 2.0 from 12/19         Code: Full  DVT: SCDs  Diet: NPO  Dispo: Pending possible scope by GI, they have been consulted.    Code Status:         Luc Scott  [facility] Internal Medicine HO-I  U Medicine Service    [facility] Medicine Hospitalist Pager numbers:   [facility] Hospitalist Medicine Team A (Samira/Juan): [phone]  [facility] Hospitalist Medicine Team B (Reg/Eduar):  [phone]

## 2020-02-13 NOTE — ANESTHESIA PREPROCEDURE EVALUATION
02/13/2020  Osorio Boggs is a 79 y.o., male w hx of 3 vessel CAD s/p DONIS x 2 (RCA, LAD 2017) on Eliquis, HIV, HTN and CKD 3 admitted with SOB, anemia, and melena. Scheduled for endoscopy today to eval for GIB.     Past Medical History:   Diagnosis Date    Anemia     CAD (coronary artery disease)     CKD (chronic kidney disease) stage 3, GFR 30-59 ml/min     Edema     Glaucoma     HIV infection     Hyperkalemia     Hyperlipidemia     Hypertension     Hypocalcemia     Renal cyst, acquired, right     Rheumatoid factor positive     Vitamin D deficiency      Past Surgical History:   Procedure Laterality Date    BACK SURGERY      CATARACT EXTRACTION      CORONARY ANGIOPLASTY  06/19/2017    PERCUTANEOUS ENDOVENOUS LASER ABLATION OF PERIPHERAL VEIN N/A 7/17/2019    Procedure: Ablation, Vein, Peripheral, Percutaneous, Endovenous, Using Laser;  Surgeon: Manuel Metzger MD;  Location: [facility] CATH LAB/EP;  Service: Cardiology;  Laterality: N/A;    SPINE SURGERY         Recent Labs   Lab 02/13/20  0354   WBC 13.38*   RBC 2.09*   HGB 6.3*   HCT 19.8*      MCV 95   MCH 30.1   MCHC 31.8*       Chemistry        Component Value Date/Time     02/13/2020 0354    K 5.1 02/13/2020 0354     (H) 02/13/2020 0354    CO2 17 (L) 02/13/2020 0354    BUN 92 (H) 02/13/2020 0354    CREATININE 1.8 (H) 02/13/2020 0354     (H) 02/13/2020 0354        Component Value Date/Time    CALCIUM 8.0 (L) 02/13/2020 0354    ALKPHOS 49 (L) 02/13/2020 0354    AST 20 02/13/2020 0354    ALT 11 02/13/2020 0354    BILITOT 0.3 02/13/2020 0354    ESTGFRAFRICA 40 (A) 02/13/2020 0354    EGFRNONAA 35 (A) 02/13/2020 0354        Vitals:    02/13/20 0540 02/13/20 0555 02/13/20 0701 02/13/20 0743   BP: (!) 129/53 (!) 117/56  138/61   BP Location:    Left arm   Patient Position:    Lying   Pulse: 80 77 81 77   Resp: 18 18  20    Temp: 36.9 °C (98.4 °F) 36.3 °C (97.4 °F)  36.8 °C (98.2 °F)   TempSrc: Oral Oral  Oral   SpO2: 100% 100%     Weight:  87.8 kg (193 lb 9 oz)     Height:             Anesthesia Evaluation    I have reviewed the Patient Summary Reports.    I have reviewed the Nursing Notes.   I have reviewed the Medications.     Review of Systems  Anesthesia Hx:  No problems with previous Anesthesia  History of prior surgery of interest to airway management or planning: Previous anesthesia: General, MAC  Denies Personal Hx of Anesthesia complications.   Social:  Non-Smoker    Hematology/Oncology:         -- Anemia: Hematology Comments: Transfused this admission, last HB 6.3    Cardiovascular:   Exercise tolerance: good Hypertension, well controlled CAD  CABG/stent  PVD ECG has been reviewed.    Renal/:   Chronic Renal Disease, CRI    Hepatic/GI:   GIB   Musculoskeletal:   Previous spine surgery    Neurological:  Neurology Normal        Physical Exam  General:  Well nourished    Airway/Jaw/Neck:  Airway Findings: Mouth Opening: Normal Tongue: Normal  General Airway Assessment: Adult, Average  Mallampati: II  Improves to II with phonation.  TM Distance: Normal, at least 6 cm  Jaw/Neck Findings:     Neck ROM: Normal ROM      Dental:  Dental Findings: Upper partial dentures, Lower partial dentures   Chest/Lungs:  Chest/Lungs Findings: Clear to auscultation     Heart/Vascular:  Heart Findings: Rate: Normal  Rhythm: Regular Rhythm        Mental Status:  Mental Status Findings:  Cooperative, Alert and Oriented         Anesthesia Plan  Type of Anesthesia, risks & benefits discussed:  Anesthesia Type:  MAC, general  Patient's Preference:   Intra-op Monitoring Plan: standard ASA monitors  Intra-op Monitoring Plan Comments:   Post Op Pain Control Plan:   Post Op Pain Control Plan Comments:   Induction:   IV  Beta Blocker:  Patient is on a Beta-Blocker and has received one dose within the past 24 hours (No further documentation required).        Informed Consent: Patient understands risks and agrees with Anesthesia plan.  Questions answered. Anesthesia consent signed with patient.  ASA Score: 3     Day of Surgery Review of History & Physical: I have interviewed and examined the patient. I have reviewed the patient's H&P dated:  There are no significant changes.          Ready For Surgery From Anesthesia Perspective.        5

## 2020-02-13 NOTE — ANESTHESIA POSTPROCEDURE EVALUATION
Anesthesia Post Evaluation    Patient: Osorio Boggs    Procedure(s) Performed: Procedure(s) (LRB):  EGD (ESOPHAGOGASTRODUODENOSCOPY) (N/A)    Final Anesthesia Type: MAC    Patient location during evaluation: GI PACU  Patient participation: Yes- Able to Participate  Level of consciousness: awake and alert  Post-procedure vital signs: reviewed and stable  Pain management: adequate  Airway patency: patent    PONV status at discharge: No PONV  Anesthetic complications: no      Cardiovascular status: hemodynamically stable  Respiratory status: unassisted, spontaneous ventilation and room air  Hydration status: euvolemic  Follow-up not needed.          Vitals Value Taken Time   /58 2/13/2020  9:53 AM   Temp 36.7 °C (98.1 °F) 2/13/2020  9:53 AM   Pulse 81 2/13/2020  9:53 AM   Resp 18 2/13/2020  9:53 AM   SpO2 100 % 2/13/2020  9:53 AM         No case tracking events are documented in the log.      Pain/Shefali Score: No data recorded

## 2020-02-13 NOTE — ED NOTES
Daughter states she is taking pt clothes home and will bring his medications back here or to [facility] so they can be verified.  States pt was given two new medications today but does not know names.

## 2020-02-13 NOTE — NURSING
MD at . Blood transfusion consent to give obtained per MD. MD explained all risks and benefits. Pt AAO x3. Voice complete understanding. Stated he agrees and will accept blood transfusion. No questions asked.

## 2020-02-13 NOTE — [employer] PATIENT INSTRUCTIONS
Discharge Summary/Instructions after an Endoscopic Procedure  Patient Name: Osorio Boggs  Patient MRN: [id number]  Patient YOB: 1940 Thursday, February 13, 2020  Garland Márquez MD  RESTRICTIONS:  During your procedure today, you received medications for sedation.  These   medications may affect your judgment, balance and coordination.  Therefore,   for 24 hours, you have the following restrictions:   - DO NOT drive a car, operate machinery, make legal/financial decisions,   sign important papers or drink alcohol.    ACTIVITY:  Today: no heavy lifting, straining or running due to procedural   sedation/anesthesia.  The following day: return to full activity including work.  DIET:  Eat and drink normally unless instructed otherwise.     TREATMENT FOR COMMON SIDE EFFECTS:  - Mild abdominal pain, nausea, belching, bloating or excessive gas:  rest,   eat lightly and use a heating pad.  - Sore Throat: treat with throat lozenges and/or gargle with warm salt   water.  - Because air was used during the procedure, expelling large amounts of air   from your rectum or belching is normal.  - If a bowel prep was taken, you may not have a bowel movement for 1-3 days.    This is normal.  SYMPTOMS TO WATCH FOR AND REPORT TO YOUR PHYSICIAN:  1. Abdominal pain or bloating, other than gas cramps.  2. Chest pain.  3. Back pain.  4. Signs of infection such as: chills or fever occurring within 24 hours   after the procedure.  5. Rectal bleeding, which would show as bright red, maroon, or black stools.   (A tablespoon of blood from the rectum is not serious, especially if   hemorrhoids are present.)  6. Vomiting.  7. Weakness or dizziness.  GO DIRECTLY TO THE NEAREST EMERGENCY ROOM IF YOU HAVE ANY OF THE FOLLOWING:      Difficulty breathing              Chills and/or fever over 101 F   Persistent vomiting and/or vomiting blood   Severe abdominal pain   Severe chest pain   Black, tarry stools   Bleeding- more than one  tablespoon   Any other symptom or condition that you feel may need urgent attention  Your doctor recommends these additional instructions:  If any biopsies were taken, your doctors clinic will contact you in 1 to 2   weeks with any results.  - Return patient to hospital gil for ongoing care. Observe pt overnight.  - Clear liquid diet today, advance as tolerated this afternoon/evening   - Use Protonix (pantoprazole) 40 mg PO BID for 8 weeks.   - Recommend Cardiology evaluate inpatient to make recommendations regarding   Eliquis as it would be best to hold this therapy for 10 days if possible   - Repeat EGD in 6-8 weeks to check for esophagitis healing and exclude   neoplasia  For questions, problems or results please call your physician - Garland Márquez MD at Work:  [phone].  EMERGENCY PHONE NUMBER: [phone],  LAB RESULTS: [phone]  IF A COMPLICATION OR EMERGENCY SITUATION ARISES AND YOU ARE UNABLE TO REACH   YOUR PHYSICIAN - GO DIRECTLY TO THE EMERGENCY ROOM.  MD Garland Mcdaniel MD  2/13/2020 12:05:05 PM  This report has been verified and signed electronically.  [employer]

## 2020-02-13 NOTE — PLAN OF CARE
Fall risk band in place. Yellow socks in place. Call bell remains at side. Rails up x3. Bed alarm remains on.  Strict bedrest. No injuries acquied by the end of shift. Pt voice complete understanding of fall precautions, stated he will comply.

## 2020-02-13 NOTE — CODE/ RAPID DOCUMENTATION
Patient remains alert sitting up in chair.  Patient then assisted to bed and laid in supine position.

## 2020-02-13 NOTE — ED NOTES
Report received from ULISSES Drake RN.   Assumed care of pt.   Pt is to be transferred to [facility]. Per ULISSES Drake, report given to Pau.  Pt is lying on stretcher c respirations even, unlabored sravani PUENTE. Denies any needs  Cardiac monitor, bp cuff and pulse oximetry in place.

## 2020-02-13 NOTE — NURSING
Pt now wide awake, laughing, joking and talking with nurse. Denies any complaints. RBC transfusion almost complete. No reaction as of now, tolerating well.

## 2020-02-13 NOTE — NURSING
Plan of care reviewed with patient. Voiced understanding. NSR on monitor with no red alarms noted. EGD complete. US lower extremities ordered. No acute distress noted at this time. Side rails x3, bed low, call bell within reach. Maintain bed alarm for patient safety. Patient will be monitored overnight.

## 2020-02-13 NOTE — ASSESSMENT & PLAN NOTE
Repeat US of BLE to evaluate for DVT  GIB with Hgb 6 on Eliquis.   Last positive DVT study was in 04/2018 with SFV DVT   EGD revealed gastritis and a esophageal ulcer which was treated with a clip    Hold Eliquis as recommended by GI and resume when safe from GI perspective

## 2020-02-13 NOTE — PLAN OF CARE
Recovery complete. Patient recovered to baseline. Discharge instructions reviewed with patient. VSS. Report phoned to Nurse Shanthi on 4th floor. Pt transferred back to unit in [facility].

## 2020-02-13 NOTE — CONSULTS
"[facility]    CC: anemia    HPI 79 y.o. male with HTN, CKD3, HIV, CAD s/p PCI on ASA only, DVT on Eliquis presents with acute, severe, symptomatic anemia with melena. Was in his usual state of health until 4 days ago when he developed dyspnea on exertion and orthostatic symptoms. Then, 2-3 days ago he began having dark stools. No NSAID use.Takes Eliquis for DVT. No longer taking Plavix per his Cardiologist.     Reports last colonoscopy 5 years ago normal, recommended repeat in 10yrs. Never had EGD before.    Chart reviewed and summarized here.    Past Medical History HTN, CKD3, HIV, CAD s/p multiple PCI 2017, anemia, DVT on Eliquis, cholelithiasis    Past Surgical History PCI, back surgery    Social History no tobacco, EtOH or drugs    Family History no colon cancer    Review of Systems  General ROS: +fatigue, negative for chills, fever or weight loss  Psychological ROS: negative for hallucination, depression or suicidal ideation  Ophthalmic ROS: negative for blurry vision, photophobia or eye pain  ENT ROS: negative for epistaxis, sore throat or rhinorrhea  Respiratory ROS: +shortness of breath, no cough or wheezing  Cardiovascular ROS: +BEATTY, no chest pain  Gastrointestinal ROS: +melena, no abdominal pain, change in bowel habits, or bloody stools  Genito-Urinary ROS: no dysuria, trouble voiding, or hematuria  Musculoskeletal ROS: negative for gait disturbance or muscular weakness  Neurological ROS: no syncope or seizures; no ataxia  Dermatological ROS: negative for pruritis, rash and jaundice    Physical Examination  BP (!) 117/56   Pulse 77   Temp 97.4 °F (36.3 °C) (Oral)   Resp 18   Ht 6' 2" (1.88 m)   Wt 87.8 kg (193 lb 9 oz)   SpO2 100%   BMI 24.85 kg/m²   General appearance: alert, cooperative, no distress  HENT: Normocephalic, atraumatic, neck symmetrical, no nasal discharge   Eyes: conjunctivae/corneas clear, PERRL, EOM's intact  Lungs: clear to auscultation bilaterally, no dullness to " percussion bilaterally  Heart: regular rate and rhythm without rub; no displacement of the PMI   Abdomen: soft, non-tender; bowel sounds normoactive; no organomegaly  Extremities: extremities symmetric; no clubbing, cyanosis, or edema  Integument: Skin color, texture, turgor normal; no rashes; hair distrubution normal  Neurologic: Alert and oriented X 3, normal strength, normal coordination and gait  Psychiatric: no pressured speech; normal affect; no evidence of impaired cognition     Labs: personally reviewed  Hgb 13.0 > 6.2 over 1 month, MCV 95, plt 153  BUN 92 / Cr 1.8 (baseline 30/1.6); LFT ok aside from albumin 2.8  Ferritin 72, TIBC 326  B12 217, folate 3.5    Imaging: personally reviewed U/S cholelithiasis, no biliary dilatation, tiny R kidney cyst    Assessment:   78yo M HTN, CKD3, HIV, CAD s/p PCI on ASA only, DVT on Eliquis presents with acute, severe, symptomatic anemia with melena. Hgb 1mo ago 13.0, now 6.2. Ddx PUD vs AVMs vs Dieulafoy vs malignancy vs AoCKD vs AoCD.    Plan:   - EGD today  - ok for IV PPI BID for now  - maintain 2 large bore IVs  - transfuse for Hgb <8 given significant cardiac history (multiple PCI and stents)  - replete B12 and folate    Dwayne Shine MD PGY1    Marychuy Carver MD, RITU  [facility], PGY-4  Cell: [phone]  Pager: [phone]

## 2020-02-13 NOTE — TRANSFER OF CARE
"Anesthesia Transfer of Care Note    Patient: Osorio Boggs    Procedure(s) Performed: Procedure(s) (LRB):  EGD (ESOPHAGOGASTRODUODENOSCOPY) (N/A)    Patient location: GI    Anesthesia Type: MAC    Transport from OR: Transported from OR on room air with adequate spontaneous ventilation    Post pain: adequate analgesia    Post assessment: no apparent anesthetic complications and tolerated procedure well    Post vital signs: stable    Level of consciousness: responds to stimulation and sedated    Nausea/Vomiting: no nausea/vomiting    Complications: none    Transfer of care protocol was followed      Last vitals:   Visit Vitals  BP (!) 145/58 (Patient Position: Lying)   Pulse 81   Temp 36.7 °C (98.1 °F) (Temporal)   Resp 18   Ht 6' 2" (1.88 m)   Wt 87.8 kg (193 lb 9 oz)   SpO2 100%   BMI 24.85 kg/m²     "

## 2020-02-14 VITALS
RESPIRATION RATE: 18 BRPM | HEART RATE: 58 BPM | OXYGEN SATURATION: 97 % | BODY MASS INDEX: 24.84 KG/M2 | WEIGHT: 193.56 LBS | HEIGHT: 74 IN | SYSTOLIC BLOOD PRESSURE: 117 MMHG | DIASTOLIC BLOOD PRESSURE: 58 MMHG | TEMPERATURE: 97 F

## 2020-02-14 PROBLEM — E87.5 HYPERKALEMIA: Status: RESOLVED | Noted: 2020-02-12 | Resolved: 2020-02-14

## 2020-02-14 PROBLEM — I82.533 CHRONIC DEEP VEIN THROMBOSIS (DVT) OF POPLITEAL VEIN OF BOTH LOWER EXTREMITIES: Status: ACTIVE | Noted: 2019-06-05

## 2020-02-14 PROBLEM — K92.1 GASTROINTESTINAL HEMORRHAGE WITH MELENA: Status: RESOLVED | Noted: 2020-02-13 | Resolved: 2020-02-14

## 2020-02-14 PROBLEM — D64.9 SYMPTOMATIC ANEMIA: Status: RESOLVED | Noted: 2020-02-12 | Resolved: 2020-02-14

## 2020-02-14 PROBLEM — I82.433: Status: ACTIVE | Noted: 2020-02-14

## 2020-02-14 LAB
ALBUMIN SERPL BCP-MCNC: 2.7 G/DL (ref 3.5–5.2)
ALP SERPL-CCNC: 50 U/L (ref 55–135)
ALT SERPL W/O P-5'-P-CCNC: 12 U/L (ref 10–44)
ANION GAP SERPL CALC-SCNC: 5 MMOL/L (ref 8–16)
AST SERPL-CCNC: 25 U/L (ref 10–40)
BASOPHILS # BLD AUTO: 0.03 K/UL (ref 0–0.2)
BASOPHILS # BLD AUTO: 0.03 K/UL (ref 0–0.2)
BASOPHILS NFR BLD: 0.4 % (ref 0–1.9)
BASOPHILS NFR BLD: 0.4 % (ref 0–1.9)
BILIRUB SERPL-MCNC: 0.2 MG/DL (ref 0.1–1)
BLD PROD TYP BPU: NORMAL
BLD PROD TYP BPU: NORMAL
BLOOD UNIT EXPIRATION DATE: NORMAL
BLOOD UNIT EXPIRATION DATE: NORMAL
BLOOD UNIT TYPE CODE: 7300
BLOOD UNIT TYPE CODE: NORMAL
BLOOD UNIT TYPE: NORMAL
BLOOD UNIT TYPE: NORMAL
BUN SERPL-MCNC: 53 MG/DL (ref 8–23)
CALCIUM SERPL-MCNC: 8.1 MG/DL (ref 8.7–10.5)
CHLORIDE SERPL-SCNC: 115 MMOL/L (ref 95–110)
CO2 SERPL-SCNC: 19 MMOL/L (ref 23–29)
CODING SYSTEM: NORMAL
CODING SYSTEM: NORMAL
CREAT SERPL-MCNC: 1.5 MG/DL (ref 0.5–1.4)
DIFFERENTIAL METHOD: ABNORMAL
DIFFERENTIAL METHOD: ABNORMAL
DISPENSE STATUS: NORMAL
DISPENSE STATUS: NORMAL
EOSINOPHIL # BLD AUTO: 0.4 K/UL (ref 0–0.5)
EOSINOPHIL # BLD AUTO: 0.4 K/UL (ref 0–0.5)
EOSINOPHIL NFR BLD: 4.2 % (ref 0–8)
EOSINOPHIL NFR BLD: 4.5 % (ref 0–8)
ERYTHROCYTE [DISTWIDTH] IN BLOOD BY AUTOMATED COUNT: 17.2 % (ref 11.5–14.5)
ERYTHROCYTE [DISTWIDTH] IN BLOOD BY AUTOMATED COUNT: 17.6 % (ref 11.5–14.5)
EST. GFR  (AFRICAN AMERICAN): 50 ML/MIN/1.73 M^2
EST. GFR  (NON AFRICAN AMERICAN): 43 ML/MIN/1.73 M^2
GLUCOSE SERPL-MCNC: 116 MG/DL (ref 70–110)
HCT VFR BLD AUTO: 21.5 % (ref 40–54)
HCT VFR BLD AUTO: 25.6 % (ref 40–54)
HGB BLD-MCNC: 6.8 G/DL (ref 14–18)
HGB BLD-MCNC: 8.2 G/DL (ref 14–18)
IMM GRANULOCYTES # BLD AUTO: 0.09 K/UL (ref 0–0.04)
IMM GRANULOCYTES # BLD AUTO: 0.12 K/UL (ref 0–0.04)
IMM GRANULOCYTES NFR BLD AUTO: 1.1 % (ref 0–0.5)
IMM GRANULOCYTES NFR BLD AUTO: 1.4 % (ref 0–0.5)
LYMPHOCYTES # BLD AUTO: 2.2 K/UL (ref 1–4.8)
LYMPHOCYTES # BLD AUTO: 2.6 K/UL (ref 1–4.8)
LYMPHOCYTES NFR BLD: 25.4 % (ref 18–48)
LYMPHOCYTES NFR BLD: 31.2 % (ref 18–48)
MCH RBC QN AUTO: 29.8 PG (ref 27–31)
MCH RBC QN AUTO: 30 PG (ref 27–31)
MCHC RBC AUTO-ENTMCNC: 31.6 G/DL (ref 32–36)
MCHC RBC AUTO-ENTMCNC: 32 G/DL (ref 32–36)
MCV RBC AUTO: 93 FL (ref 82–98)
MCV RBC AUTO: 95 FL (ref 82–98)
MONOCYTES # BLD AUTO: 0.7 K/UL (ref 0.3–1)
MONOCYTES # BLD AUTO: 0.7 K/UL (ref 0.3–1)
MONOCYTES NFR BLD: 7.9 % (ref 4–15)
MONOCYTES NFR BLD: 8.9 % (ref 4–15)
NEUTROPHILS # BLD AUTO: 4.5 K/UL (ref 1.8–7.7)
NEUTROPHILS # BLD AUTO: 5.2 K/UL (ref 1.8–7.7)
NEUTROPHILS NFR BLD: 53.6 % (ref 38–73)
NEUTROPHILS NFR BLD: 61 % (ref 38–73)
NRBC BLD-RTO: 1 /100 WBC
NRBC BLD-RTO: 1 /100 WBC
PLATELET # BLD AUTO: 144 K/UL (ref 150–350)
PLATELET # BLD AUTO: 145 K/UL (ref 150–350)
PMV BLD AUTO: 10.8 FL (ref 9.2–12.9)
PMV BLD AUTO: 11 FL (ref 9.2–12.9)
POTASSIUM SERPL-SCNC: 4.5 MMOL/L (ref 3.5–5.1)
PROT SERPL-MCNC: 5 G/DL (ref 6–8.4)
RBC # BLD AUTO: 2.27 M/UL (ref 4.6–6.2)
RBC # BLD AUTO: 2.75 M/UL (ref 4.6–6.2)
SODIUM SERPL-SCNC: 139 MMOL/L (ref 136–145)
TRANS ERYTHROCYTES VOL PATIENT: NORMAL ML
TRANS ERYTHROCYTES VOL PATIENT: NORMAL ML
WBC # BLD AUTO: 8.36 K/UL (ref 3.9–12.7)
WBC # BLD AUTO: 8.51 K/UL (ref 3.9–12.7)

## 2020-02-14 PROCEDURE — 25000003 PHARM REV CODE 250: Performed by: STUDENT IN AN ORGANIZED HEALTH CARE EDUCATION/TRAINING PROGRAM

## 2020-02-14 PROCEDURE — C9113 INJ PANTOPRAZOLE SODIUM, VIA: HCPCS | Performed by: STUDENT IN AN ORGANIZED HEALTH CARE EDUCATION/TRAINING PROGRAM

## 2020-02-14 PROCEDURE — 36415 COLL VENOUS BLD VENIPUNCTURE: CPT

## 2020-02-14 PROCEDURE — 80053 COMPREHEN METABOLIC PANEL: CPT

## 2020-02-14 PROCEDURE — 63600175 PHARM REV CODE 636 W HCPCS: Performed by: STUDENT IN AN ORGANIZED HEALTH CARE EDUCATION/TRAINING PROGRAM

## 2020-02-14 PROCEDURE — 85025 COMPLETE CBC W/AUTO DIFF WBC: CPT | Mod: 91

## 2020-02-14 PROCEDURE — P9021 RED BLOOD CELLS UNIT: HCPCS

## 2020-02-14 RX ORDER — OMEPRAZOLE 40 MG/1
40 CAPSULE, DELAYED RELEASE ORAL
Qty: 84 CAPSULE | Refills: 0 | Status: SHIPPED | OUTPATIENT
Start: 2020-02-14 | End: 2022-07-13

## 2020-02-14 RX ORDER — HYDROCODONE BITARTRATE AND ACETAMINOPHEN 500; 5 MG/1; MG/1
TABLET ORAL
Status: DISCONTINUED | OUTPATIENT
Start: 2020-02-14 | End: 2020-02-14 | Stop reason: HOSPADM

## 2020-02-14 RX ADMIN — CARVEDILOL 3.12 MG: 3.12 TABLET, FILM COATED ORAL at 09:02

## 2020-02-14 RX ADMIN — NIFEDIPINE 30 MG: 30 TABLET, FILM COATED, EXTENDED RELEASE ORAL at 09:02

## 2020-02-14 RX ADMIN — BRIMONIDINE TARTRATE 1 DROP: 1.5 SOLUTION OPHTHALMIC at 09:02

## 2020-02-14 RX ADMIN — ALLOPURINOL 100 MG: 100 TABLET ORAL at 09:02

## 2020-02-14 RX ADMIN — PANTOPRAZOLE SODIUM 40 MG: 40 INJECTION, POWDER, LYOPHILIZED, FOR SOLUTION INTRAVENOUS at 09:02

## 2020-02-14 NOTE — PROGRESS NOTES
Pharmacy New Medication Education    Patient and/or Caregiver ACCEPTED medication education.    Pharmacy has provided education on the name, indication, and possible side effects of the medication(s) prescribed, using teach-back method.     Learners of pharmacy medication education includes:  patient    The following medications have also been discussed, during this admission.     Current Facility-Administered Medications   Medication Frequency    0.9%  NaCl infusion (for blood administration) Q24H PRN    0.9%  NaCl infusion (for blood administration) Q24H PRN    allopurinoL tablet 100 mg Daily    brimonidine 0.15 % OPTH DROP ophthalmic solution 1 drop BID    carvediloL tablet 3.125 mg BID    latanoprost 0.005 % ophthalmic solution 1 drop QHS    NIFEdipine 24 hr tablet 30 mg Daily    NON FORMULARY MEDICATION 800 mg Daily    pantoprazole injection 40 mg BID    raltegravir tablet 800 mg BID          Thank you  Andrew Scott, PharmD

## 2020-02-14 NOTE — DISCHARGE SUMMARY
[facility] Internal Medicine Discharge Summary    Primary Team: [facility] Hospitalist Service Team A  Attending Physician: Trista Martinez MD  Resident: Dr. Vivi Hernandez  Intern: Dr. James Vidal    Date of Admit: 2/12/2020  Date of Discharge: 2/14/2020    Discharge to: Home  Condition: Stable    Discharge Diagnoses   Acute blood loss with anemia 2/2 upper GI bleed with melena   Consultants and Procedures     Consultants:  GI    Procedures:   EGD    Brief History of Present Illness   Osorio Boggs is a 79 y.o. male who  has a past medical history of Anemia, CAD (coronary artery disease), CKD (chronic kidney disease) stage 3, GFR 30-59 ml/min, Edema, Glaucoma, HIV infection, Hyperkalemia, Hyperlipidemia, Hypertension, Hypocalcemia, Renal cyst, acquired, right, Rheumatoid factor positive, and Vitamin D deficiency.. The patient presented to [facility] on 2/12/2020 with a primary complaint of Melena (Pt states started with black stool 2-3 days ago.      Patient reports that he started to feel SOB when walking short distances about 4 days ago. The patient reports the shortness of breath is with exertion but even mild exertion causes him to be winded. He also reports lightheadedness when he stands up to fast but resolves once he stands for several seconds.  He also feels like he doesn't have as much energy as he normally does, feels like he is getting tired easier. He also reports over the past couple days he has started to notice several episodes of dark colored stools. He denies any bright red blood in stools or per rectum, his BMs have been normal in consistency and frequency. He denies any chest pain, L arm pain, vision changes, SOB at rest, nausea, vomiting, fever, or chills.     He was at [facility] ED, where he was found to be anemic with Hgb of 6.2 GI was consulted and instructed the patient to be transferred here for EGD tomorrow AM. The patient was consented for blood and placed on IV  PPI.       Hospital Course By Problem with Pertinent Findings     1. Acute blood loss with anemia secondary to upper GI bleed with melena  - Initially presented with exertional dyspnea and melena x 4 days. H/H on presentation was 6.2/19.6.   - EGD performed revealing visible, non-bleeding esophageal vessel which was clipped.   - Holding Eliquis until Monday 2/17  - Repeat EGD in 6-8 weeks with GI: ambulatory referral/consult to GI placed   - Initiate Omeprazole 40 mg BID to end Friday 3/27/2020  -all meds were bedside delivered       2. Chronic Nonocclusive thrombus of bilateral popliteal veins   - noted on lower extremity ultrasound  - Initiate 2.5 mg Eliquis on Monday 2/14/2020     3. Coronary artery disease involving native coronary artery of native heart without angina pectoris  - s/p PCI LAD, LCX, and RCA (2017)-CTS turned down for CABG  - Continue ASA for SAPT, BB, Praluent; no ACEI/ARB given renal function  - No cardiac complaints  - Troponin negative    4. Hyperkalemia, resolved  - K of 5.4 on presentation and bart to 6.2.   - patient was shifted with calcium gluconate, albuterol, sodium bicarb in RVP with repeat potassium monitoring   - K of 4.5 today    5. Hyponatremia, resolved  - Na of 133 on presentation  - Na now 139    6. Hyperglycemia  - CMP glucose 159 on presentation, now 166. A1C of 6.0  - Defer to PCP    7. Chronic Kidney Disease stage 3   - Creatinine 1.74 eGFR41 on presentation  - Creatine from 1/20 1.64, 2.0 from 12/19   - Cr 1.5 today   -discharged on renal diet     8. Hypertension  - Systolic BP of 110s-120s  - Continue home Carvedilol 3.125mg BID and Nifedipine 30 mg daily    9. Hyperlipidemia  - Intolerant to statins  - continue home Praluent as outpatient    10. HIV, asymptomatic  - last CD4 of 1357 on 6/26/2019  - Continue home HIV medications      Discharge Medications        Medication List      START taking these medications    omeprazole 40 MG capsule  Commonly known as:   PRILOSEC  Take 1 capsule (40 mg total) by mouth 2 (two) times daily before meals.        CHANGE how you take these medications    apixaban 2.5 mg Tab  Commonly known as:  ELIQUIS  Take 1 tablet (2.5 mg total) by mouth 2 (two) times daily.  Start taking on:  February 17, 2020  What changed:    · medication strength  · how much to take  · These instructions start on February 17, 2020. If you are unsure what to do until then, ask your doctor or other care provider.        CONTINUE taking these medications    allopurinoL 100 MG tablet  Commonly known as:  ZYLOPRIM  Take 1 tablet (100 mg total) by mouth once daily.     aspirin 81 MG EC tablet  Commonly known as:  ECOTRIN     brimonidine 0.1% 0.1 % Drop  Commonly known as:  ALPHAGAN P     carvediloL 3.125 MG tablet  Commonly known as:  COREG  Take 1 tablet (3.125 mg total) by mouth 2 (two) times daily.     furosemide 80 MG tablet  Commonly known as:  LASIX  Take 1 tablet (80 mg total) by mouth daily as needed (for leg swelling).     Isentress  mg tablet  Generic drug:  raltegravir     latanoprost 0.005 % ophthalmic solution     NIFEdipine 30 MG (OSM) 24 hr tablet  Commonly known as:  PROCARDIA-XL  Take 1 tablet (30 mg total) by mouth once daily. (jazzmine presion) HOLD for BP < 100     Praluent Pen 150 mg/mL Pnij  Generic drug:  alirocumab  Inject 1 mL (150 mg total) into the skin every 14 (fourteen) days.     Prezista 800 mg Tab  Generic drug:  darunavir ethanolate     VITAMIN D3 ORAL        STOP taking these medications    Fluzone High-Dose 2019-20 (PF) 180 mcg/0.5 mL Syrg  Generic drug:  flu vacc [id number](65yr up)PF           Where to Get Your Medications      These medications were sent to [facility] [address], [place] [address]    Hours:  Mon-Fri, 8a-5:30p Phone:  [phone]   · omeprazole 40 MG capsule     Information about where to get these medications is not yet available    Ask your nurse or doctor about these  medications  · apixaban 2.5 mg Tab         Discharge Information:   Diet:  Return to normal diet    Physical Activity:  Return to normal ADLs    Instructions:  1. Take all medications as prescribed  2. Keep all follow-up appointments  3. Return to the hospital or call your primary care physicians if any worsening symptoms such as rectal bleeding, blood with vomit, shortness of breath, chest pain, and lightheadedness occurs.     Follow-Up Appointments:  Gastroenterology, PCP    James Vidal MD

## 2020-02-14 NOTE — NURSING
"Scheduled raltegravir medication unavailable via pharmacy. Pt's daughter was to bring from home but did not bring any meds. Pt stated "raltegrvir has not been picked up yet and he does not have it yet".no home meds at . Informed pt [facility] pharmacy will not be able to supply med as requested to inform pt by pharmacist and he needs to get his med and start taking it. Pt stated ok.  "

## 2020-02-14 NOTE — PLAN OF CARE
VN note: VN cued into patient's room to review discharge papers. No family at bedside. VN educated patient on new medication and side effects. Medication list reviewed. Follow-up information given. Bedside Delivery completed. VN educated patient on anemia and GI bleed signs and symptoms and when to seek medical attention. Patient verbalized understanding and all questions answered. Refer to clinical references for further education given. Patient is awaiting his ride.

## 2020-02-14 NOTE — PROGRESS NOTES
[facility]  Resident KATHYI  Progress Note    Subjective:      Patient s/p EGD p/o day . Plans to start DOAC tomorrows per GI to discharge on PPI BID with repeat EGD in 6 weeks. Appreciate Cardiology recs. Ultrasound lower extremities in process. H/H 6.8/21.5. Patient denies melena or active bleeding. Pending 1 unit transfusion with post transfusion H/H after. Patient denies chest pain, lightheadedness, dizziness, and palpitations. Last bowel movement Tuesday. Patient ate a club sandwich for dinner last night and tolerated it well.      Objective:   Last 24 Hour Vital Signs:  BP  Min: 84/49  Max: 155/69  Temp  Av.5 °F (36.4 °C)  Min: 96.3 °F (35.7 °C)  Max: 98.2 °F (36.8 °C)  Pulse  Av.3  Min: 59  Max: 81  Resp  Av.3  Min: 15  Max: 20  SpO2  Av.1 %  Min: 96 %  Max: 100 %  I/O last 3 completed shifts:  In: 2414.2 [P.O.:1000; I.V.:75; Blood:1339.2]  Out: 3375 [Urine:3375]    Physical Examination:  General:          Alert and awake in NAD  Head:               Normocephalic and atraumatic  Eyes:               PERRL; EOMi with anicteric sclera and clear conjunctivae  Mouth:             Oropharynx clear and without exudate; moist mucous membranes  Cardio:             Regular rate and rhythm with normal S1 and S2; no murmurs or rubs  Resp:              breaths unlabored; no wheezes, crackles or rhonchi  Abdom:            Soft, NTND with normoactive bowel sounds  Rectal:            Deferred because was done in the [facility] ED.  Extrem:            WWP with no clubbing, cyanosis or edema  Skin:                No rashes, lesions, or color changes  Pulses:            2+ and symmetric distally  Neuro:             AAOx3; cooperative and pleasant with no focal deficits       Laboratory:  Laboratory Data Reviewed: yes  Pertinent Findings:  CBC auto differential   Order: [id number]   Status:  Final result   Visible to patient:  No (Not Released) Next appt:  2020 at 09:00 AM in Nephrology (Myah Perry MD)  Dx:  Symptomatic anemia    Ref Range & Units 04:16 1d ago 2d ago 1mo ago   WBC 3.90 - 12.70 K/uL 8.36  13.38High   13.79High   7.9 R   RBC 4.60 - 6.20 M/uL 2.27Low   2.09Low   1.94Low   4.01Low  R   Hemoglobin 14.0 - 18.0 g/dL 6.8Low   6.3Low   6.2Low   13.0Low  R   Hematocrit 40.0 - 54.0 % 21.5Low   19.8Low Panic  CM 19.6Low Panic  CM 38.0Low  R   Mean Corpuscular Volume 82 - 98 fL 95  95  101High   94.8 R   Mean Corpuscular Hemoglobin 27.0 - 31.0 pg 30.0  30.1  32.0High   32.4 R   Mean Corpuscular Hemoglobin Conc 32.0 - 36.0 g/dL 31.6Low   31.8Low   31.6Low   34.2    RDW 11.5 - 14.5 % 17.2High   17.4High   14.4  13.5 R   Platelets 150 - 350 K/uL 144Low   153  165  223 R   MPV 9.2 - 12.9 fL 11.0  11.2  11.6  11.5 R   Immature Granulocytes 0.0 - 0.5 % 1.4High   1.9High   1.8High      Gran # (ANC) 1.8 - 7.7 K/uL 4.5  9.9High   8.7High      Immature Grans (Abs) 0.00 - 0.04 K/uL 0.12High   0.26High  CM 0.25High  CM    Comment: Mild elevation in immature granulocytes is non specific and   can be seen in a variety of conditions including stress response,   acute inflammation, trauma and pregnancy. Correlation with other   laboratory and clinical findings is essential.    Lymph # 1.0 - 4.8 K/uL 2.6  2.3  3.7  2,899 R   Mono # 0.3 - 1.0 K/uL 0.7  0.8  1.0  585 R   Eos # 0.0 - 0.5 K/uL 0.4  0.1  0.2  474 R   Baso # 0.00 - 0.20 K/uL 0.03  0.03  0.02  40 R   nRBC 0 /100 WBC 1Abnormal   1Abnormal   1Abnormal      Gran% 38.0 - 73.0 % 53.6  74.1High   63.0     Lymph% 18.0 - 48.0 % 31.2  17.2Low   27.0  36.7 R   Mono% 4.0 - 15.0 % 8.9  6.0  6.9  7.4 R   Eosinophil% 0.0 - 8.0 % 4.5  0.6  1.2  6.0 R   Basophil% 0.0 - 1.9 % 0.4  0.2  0.1  0.5 R   Differential Method  Automated  Automated  Automated     Neutrophils Absolute     3,903    Neutrophils Relative     49.4    Resulting Agency  [facility]         Specimen Collected: 02/14/20 04:16 Last Resulted: 02/14/20 05:19 Lab Flowsheet Order Details View Encounter Lab and  Collection Details Routing Result History      CM=Additional comments  R=Reference range differs from displayed range             Comprehensive metabolic panel   Order: [id number]   Status:  Final result   Visible to patient:  No (Not Released) Next appt:  03/18/2020 at 09:00 AM in Nephrology (Myah Perry MD) Dx:  Symptomatic anemia    Ref Range & Units 04:16  (2/14/20) 1d ago  (2/13/20) 2d ago  (2/12/20) 2d ago  (2/12/20)   Sodium 136 - 145 mmol/L 139  139  138  135Low     Potassium 3.5 - 5.1 mmol/L 4.5  5.1  4.8  6.2High     Chloride 95 - 110 mmol/L 115High   115High   114High   115High     CO2 23 - 29 mmol/L 19Low   17Low   16Low   15Low     Glucose 70 - 110 mg/dL 116High   166High   179High   161High     BUN, Bld 8 - 23 mg/dL 53High   92High   95High   91High  R   Creatinine 0.5 - 1.4 mg/dL 1.5High   1.8High   1.8High   1.74High  R   Calcium 8.7 - 10.5 mg/dL 8.1Low   8.0Low   8.1Low   8.2Low     Total Protein 6.0 - 8.4 g/dL 5.0Low   5.0Low       Albumin 3.5 - 5.2 g/dL 2.7Low   2.8Low       Total Bilirubin 0.1 - 1.0 mg/dL 0.2  0.3 CM     Comment: For infants and newborns, interpretation of results should be based   on gestational age, weight and in agreement with clinical   observations.   Premature Infant recommended reference ranges:   Up to 24 hours.............<8.0 mg/dL   Up to 48 hours............<12.0 mg/dL   3-5 days..................<15.0 mg/dL   6-29 days.................<15.0 mg/dL    Alkaline Phosphatase 55 - 135 U/L 50Low   49Low       AST 10 - 40 U/L 25  20      ALT 10 - 44 U/L 12  11      Anion Gap 8 - 16 mmol/L 5Low   7Low   8  5Low     eGFR if African American >60 mL/min/1.73 m^2 50Abnormal   40Abnormal   40Abnormal   41.6Abnormal     eGFR if non African American >60 mL/min/1.73 m^2 43Abnormal   35Abnormal  CM 35Abnormal  CM 36.0Abnormal  CM               Microbiology Data Reviewed: yes  Pertinent Findings:  n/a    Other Results:  EKG (my interpretation): no new     Radiology Data Reviewed:  yes  Pertinent Findings:  US lower extremity veins bilateral in process    Current Medications:     Infusions:       Scheduled:   allopurinoL  100 mg Oral Daily    brimonidine 0.15 % OPTH DROP  1 drop Both Eyes BID    carvediloL  3.125 mg Oral BID    latanoprost  1 drop Both Eyes QHS    NIFEdipine  30 mg Oral Daily    NON FORMULARY MEDICATION 800 mg  800 mg Oral Daily    pantoprazole  40 mg Intravenous BID    raltegravir  800 mg Oral BID        PRN:  sodium chloride    Antibiotics and Day Number of Therapy:  n/a    Lines and Day Number of Therapy:  PIV    Assessment:     Osorio Boggs is a 79 y.o.male with  Patient Active Problem List    Diagnosis Date Noted    Gastrointestinal hemorrhage with melena 02/13/2020    Acute blood loss anemia     Hyponatremia     Upper GI bleed 02/12/2020    Symptomatic anemia 02/12/2020    Hyperkalemia 02/12/2020    Hyperglycemia 12/03/2019    Chronic deep vein thrombosis (DVT) of popliteal vein of left lower extremity 06/05/2019    HIV infection, asymptomatic 02/05/2019    Chronic kidney disease, stage 3 02/05/2019    Venous insufficiency of both lower extremities 04/25/2018    Statin intolerance 10/03/2017    Coronary artery disease involving native coronary artery of native heart without angina pectoris 07/06/2017    PAD (peripheral artery disease) 02/09/2017    Hypertension 02/08/2017    Hyperlipidemia 02/08/2017    Glaucoma 09/17/2015        Plan:     Acute blood loss  Anemia 2/2 upper GI bleed w/ Melena  - four day history of exertional dyspnea  - black dark stools  - H/H on presentation 6.2/19.6,   - BUN 90 on presentation: consistent with upper GI bleed  - On home Eliquis     Protonix 40mg BID  -Rectal positive for Melena and stool occult blood positive  - 1 unit transuded and patients Hgb bart to 6.3, second unit was rodered and transfused.  -GI consulted with EGD performed yesterday revealing visible, non-bleeding esophageal vessel which was  clipped. Plans for repeat EGD 6-8 weeks.   -H/H below goal today-patient pending 1 unit transfusion with post transfusion H/H pending  -Cards consulted concernin Eliquis in setting of GI bleed: Cards to defer to GI recs. GI with recs to resume anticoagulant 48 hours post EGD.     Nonocclusive thrombus of bilateral popliteal veins    -noted on lower extremity ultrasound  -will monitor with f/u PCP       Hyperkalemia, resolved  -potassium of 5.4 on presentation, bart to 6.2  -patient shifted: provided calcium gluconate, albuterol, sodium bicar in RVP  -K 4.5 today      Hyponatremia, resolved   -sodium of 133 on presentation   -Sodium now 139     Hyperglycemia   -CMP glucose of 159 on presentation   - Is now 166  -A1c was 6.0     Chronic Kidney Disease stage 3  -Creatinine 1.74 eGFR41 on presentation  -Creatine from 1/20 1.64, 2.0 from 12/19   -Cr 1.5 today         Code: Full  DVT: SCDs  Diet: renal   Dispo: Pending one unit red blood cell transfusion with post transfusion H/H at goal        James Vidal  [facility] HO-I   [facility] IM Service Team 1  [facility] Medicine Hospitalist Pager numbers:   [facility] Hospitalist Medicine Team A (Samira/Juan): [phone]  [facility] Hospitalist Medicine Team B (Reg/Eduar):  [phone]

## 2020-02-14 NOTE — PLAN OF CARE
Discharge rounds on patient. Discussed followup appointments, blue discharge folder, discharge nurse will go over home medications and reasons for medications and final discharge instructions. All patient/caregiver questions answered. Patient verbalized understanding.      Feb27 Go to Trinity Moser MD   Thursday Feb 27, 2020   at 245pm; FOLLOW UP WITH ID  [id number]   [place] [address]   [phone]    Mar18 Established Patient with Myah Perry MD   Wednesday Mar 18, 2020 9:00 AM   Arrive at check-in approximately 15 minutes before your scheduled appointment time. Bring all outside medical records and imaging, along with a list of your current medications and insurance card. [facility] [place]   [address], [address]  [facility] [address]   [phone]    Mar25 Established Patient Visit with Maunel Metzger MD   Wednesday Mar 25, 2020 10:00 AM   Arrive at check-in approximately 15 minutes before your scheduled appointment time. Bring all outside medical records and imaging, along with a list of your current medications and insurance card. [facility] - Cardiology   [address] [place] [address]. [address]  [facility] [address]   [phone]    Mar31 Hospital Follow Up with Edward Wilkerson MD   Tuesday Mar 31, 2020 2:40 PM   Arrive at check-in approximately 15 minutes before your scheduled appointment time. Bring all outside medical records and imaging, along with a list of your current medications and insurance card. [place] - Internal Medicine   [address]  [place] [address]   [phone]           Go to Edward Wilkerson MD   Tuesday Mar 31, 2020   at  0240pm ;FOLLOW UP WITH PCP; office will call if earlier appointment is available  [address]   [place] [address]   [phone]    Apr28 Physical with Edward Wilkerson MD   Tuesday Apr 28, 2020 9:40 AM   Arrive at check-in approximately 15 minutes before your scheduled appointment time. Bring all outside  medical records and imaging, along with a list of your current medications and insurance card. [facility]   [address] [facility] [address] [facility]  [place] [address] [phone]        02/14/20 1155   Final Note   Assessment Type Final Discharge Note   Anticipated Discharge Disposition Home   What phone number can be called within the next 1-3 days to see how you are doing after discharge? [phone]   Hospital Follow Up  Appt(s) scheduled? Yes   Discharge plans and expectations educations in teach back method with documentation complete? Yes   Right Care Referral Info   Post Acute Recommendation No Care     Jessica Woodruff RN  RN Transition Navigator  [phone]

## 2020-02-14 NOTE — DISCHARGE INSTRUCTIONS
DC instructions were reviewed by Noa Ryan RN VN, Pt's telemetry monitor was DC'ed and sent back to Tele Room for cleaning, IV DC'ed.  Assisted pt with

## 2020-02-14 NOTE — PLAN OF CARE
VN note: VN cued into patient's room for introduction. No family at bedside. VN informed patient that VN would be working closely along side bedside nurse, PCT, and the rest of the care team and making rounds throughout the shift. Patient verbalized understanding. Allowed time for questions. VN will continue to be available to patient and intervene prn.      Patient is aware of discharge scheduled for today after criteria has been met. Patient informed VN he was told to start his Eliquis on Monday 2/17/2020. AVS confirmed as well. I also notified patient omeprazole (prilosec) added as new medication for discharge. Will continue to follow.

## 2020-02-17 ENCOUNTER — NURSE TRIAGE (OUTPATIENT)
Dept: ADMINISTRATIVE | Facility: CLINIC | Age: 80
End: 2020-02-17

## 2020-02-17 NOTE — TELEPHONE ENCOUNTER
Pt returning TCC call. Message given to Pam Escobar LPN. Pt notified he will receive call back and pt verbalizes understanding.     Reason for Disposition   [1] Follow-up call to recent contact AND [2] information only call, no triage required    Additional Information   Negative: RN needs further essential information from caller in order to complete triage   Negative: Requesting regular office appointment   Negative: [1] Caller requesting NON-URGENT health information AND [2] PCP's office is the best resource   Negative: Health Information question, no triage required and triager able to answer question   Negative: General information question, no triage required and triager able to answer question   Negative: [1] Caller is not with the adult (patient) AND [2] probable NON-URGENT symptoms   Negative: Question about upcoming scheduled test, no triage required and triager able to answer question    Protocols used: INFORMATION ONLY CALL-A-

## 2020-02-18 ENCOUNTER — TELEPHONE (OUTPATIENT)
Dept: INTERNAL MEDICINE | Facility: CLINIC | Age: 80
End: 2020-02-18

## 2020-02-18 NOTE — TELEPHONE ENCOUNTER
----- Message from Brooklyn Finney sent at 2/18/2020 11:33 AM CST -----  Contact: patient  Patient called to speak with Dr aguillon concerning being admitted in the hospital.    He would like a callback at [phone]    Thanks  KB

## 2020-02-18 NOTE — TELEPHONE ENCOUNTER
Spoke with patient who informed me he is worried about his blood count . Patient is requesting lab work . I did inform patient I will send a message to  . Patient voices understanding

## 2020-02-18 NOTE — PHYSICIAN QUERY
"PT Name: Osorio Boggs  MR #: [id number]    Physician Query Form - Cause and Effect Relationship Clarification      Marianne Roman RN, CCDS; Desk # [phone]; [email or web address]      This form is a permanent document in the medical record.     Query Date: February 18, 2020    By submitting this query, we are merely seeking further clarification of documentation. Please utilize your independent clinical judgment when addressing the question(s) below.    The Medical record contains the following:  Supporting Clinical Findings   Location in record   ABLA 2/2 UGIB w/melena  on home eliquis  Protonix 40 mg IBD                             H&P     EGD 2/13  Impression:   - Esophageal ulcer with visible vessel - treated     with hemoclip  - 78yo M with DVT on Eliquis with acute anemia and    Melena.    Recs:  - Use Protonix (pantoprazole) 40 mg PO BID for 8     weeks.  - Recommend Cardiology evaluate inpatient to make     recommendations regarding Eliquis as it would be     best to hold this therapy for 10 days if possible -     Repeat EGD in 6-8 weeks to check for esophagitis     healing and exclude neoplasia                                                                                                                EGD Report       Provider, please clarify if there is any correlation between "UGIB" and "Eliquis".           Are the conditions:    [  ] Due to or associated with each other   [  ] Unrelated to each other   [  ] Other (Please Specify): ___________________   [ x ] Clinically Undetermined                                                                                                                                                                                             "

## 2020-02-19 ENCOUNTER — TELEPHONE (OUTPATIENT)
Dept: FAMILY MEDICINE | Facility: CLINIC | Age: 80
End: 2020-02-19

## 2020-02-19 ENCOUNTER — TELEPHONE (OUTPATIENT)
Dept: INTERNAL MEDICINE | Facility: CLINIC | Age: 80
End: 2020-02-19

## 2020-02-19 DIAGNOSIS — L03.116 CELLULITIS OF LEFT LOWER EXTREMITY: Primary | ICD-10-CM

## 2020-02-19 DIAGNOSIS — I10 ESSENTIAL HYPERTENSION: ICD-10-CM

## 2020-02-19 RX ORDER — NIFEDIPINE 30 MG/1
30 TABLET, EXTENDED RELEASE ORAL DAILY
Qty: 90 TABLET | Refills: 1 | Status: SHIPPED | OUTPATIENT
Start: 2020-02-19 | End: 2020-08-17

## 2020-02-19 NOTE — TELEPHONE ENCOUNTER
Called pt to inform him of lab work ordered, pt requested to go to [facility], resulting lab in the orders were updated for the pt.

## 2020-02-19 NOTE — TELEPHONE ENCOUNTER
----- Message from Edward Wilkerson MD sent at 2/19/2020  3:02 PM CST -----   Schedule CBC Wednesday  Of next week

## 2020-02-19 NOTE — TELEPHONE ENCOUNTER
Spoke with patient and he is still having black stools. Informed will send message to the Dr and call back with recommendations. Patient voices understanding.

## 2020-02-19 NOTE — TELEPHONE ENCOUNTER
----- Message from Edward Wilkerson MD sent at 2/18/2020  5:50 PM CST -----  Hemoglobin was good on the 14th of February.  Checking so soon would be premature unless he was having new signs of bleeding such as black bowel movements.  I would not recommend repeating blood work so soon unless he has new and concerning symptoms.    ----- Message -----  From: Alayna Nino MA  Sent: 2/18/2020   4:41 PM CST  To: Edward Wilkerson MD     is requesting lab work to have blood count checked ,after having so much done Friday at the ER.

## 2020-02-19 NOTE — TELEPHONE ENCOUNTER
Patient states he was accidentally taking 5 mg of Eliquis twice daily and that he did not realize that the discharge summary indicated that he should be taking 2.5 mg twice daily.  States over the past 3 days he has been having some increase in dark colored stool.  Does not feel any unusual fatigue.  No abdominal pain.  Patient agrees to discontinue Eliquis for 3 days and then when restarting begin the appropriate lower dosage.  Emergency Room precautions given.

## 2020-02-21 ENCOUNTER — TELEPHONE (OUTPATIENT)
Dept: PHARMACY | Facility: CLINIC | Age: 80
End: 2020-02-21

## 2020-02-21 NOTE — TELEPHONE ENCOUNTER
Praluent refill. Confirmed two patient identifiers - name + . Patient confirms dosage (inject QoTh). Patient has 0 doses remaining, next dose needed by 20. [facility] to ship out Praluent on 20 to arrive at patients home on 20 via [employer]. He was very nervous about getting the shipment the day his dose is due, normally injections in the morning. He is informed that due to the MardiGras holiday, we are restricted on shipping days next week. He is assured that he can take it when he gets the on 20, regardless of time of day and that's ok. There is actually a 7 day window to make up the dose if needed. He verbalized understanding. Address confirmed (Notes to [employer]: none), $0 Copay, Supplies needed: injection kit, used Sharps can be locked and thrown in regular trash per [place] law. He states starting Omeprazole and Issentress - no major DDIs; monitor and report any issues to [facility]/provider. Patient denies  having any new diagnoses or allergies, side effects, or missing any doses. All questions answered to patients satisfaction. [facility] will continue to reach out to patient monthly for refills. TTN

## 2020-02-26 ENCOUNTER — TELEPHONE (OUTPATIENT)
Dept: NEUROLOGY | Facility: HOSPITAL | Age: 80
End: 2020-02-26

## 2020-02-26 DIAGNOSIS — D64.9 SYMPTOMATIC ANEMIA: Primary | ICD-10-CM

## 2020-02-26 LAB
BASOPHILS # BLD AUTO: 29 CELLS/UL (ref 0–200)
BASOPHILS NFR BLD AUTO: 0.3 %
EOSINOPHIL # BLD AUTO: 563 CELLS/UL (ref 15–500)
EOSINOPHIL NFR BLD AUTO: 5.8 %
ERYTHROCYTE [DISTWIDTH] IN BLOOD BY AUTOMATED COUNT: 16.6 % (ref 11–15)
HCT VFR BLD AUTO: 25.2 % (ref 38.5–50)
HGB BLD-MCNC: 8.3 G/DL (ref 13.2–17.1)
LYMPHOCYTES # BLD AUTO: 2619 CELLS/UL (ref 850–3900)
LYMPHOCYTES NFR BLD AUTO: 27 %
MCH RBC QN AUTO: 29.1 PG (ref 27–33)
MCHC RBC AUTO-ENTMCNC: 32.9 G/DL (ref 32–36)
MCV RBC AUTO: 88.4 FL (ref 80–100)
MONOCYTES # BLD AUTO: 825 CELLS/UL (ref 200–950)
MONOCYTES NFR BLD AUTO: 8.5 %
NEUTROPHILS # BLD AUTO: 5665 CELLS/UL (ref 1500–7800)
NEUTROPHILS NFR BLD AUTO: 58.4 %
PLATELET # BLD AUTO: 244 THOUSAND/UL (ref 140–400)
PMV BLD REES-ECKER: 11.5 FL (ref 7.5–12.5)
RBC # BLD AUTO: 2.85 MILLION/UL (ref 4.2–5.8)
WBC # BLD AUTO: 9.7 THOUSAND/UL (ref 3.8–10.8)

## 2020-02-26 NOTE — TELEPHONE ENCOUNTER
Repeat egd scheduled with pt on Thursday, March 26, 2020.  Pt instructed to eat light meals on Wednesday, March 25, 2020 with nothing to eat or drink after midnight.  Pt informed Endoscopy staff will contact 2 days prior to confirm arrival time to [facility] 1st floor Admit.  Pt repeated all information given correctly.

## 2020-02-26 NOTE — TELEPHONE ENCOUNTER
----- Message from Garland Márquez MD sent at 2/13/2020  4:11 PM CST -----  Please call this patient next week to plan for repeat EGD in 6 weeks

## 2020-02-27 ENCOUNTER — TELEPHONE (OUTPATIENT)
Dept: INTERNAL MEDICINE | Facility: CLINIC | Age: 80
End: 2020-02-27

## 2020-02-27 NOTE — TELEPHONE ENCOUNTER
Contact patient, let him know that CBC results are stable and he should remain on lower dose of eliquis.

## 2020-03-04 ENCOUNTER — TELEPHONE (OUTPATIENT)
Dept: NEUROLOGY | Facility: HOSPITAL | Age: 80
End: 2020-03-04

## 2020-03-19 ENCOUNTER — TELEPHONE (OUTPATIENT)
Dept: PHARMACY | Facility: CLINIC | Age: 80
End: 2020-03-19

## 2020-03-19 ENCOUNTER — TELEPHONE (OUTPATIENT)
Dept: NEUROLOGY | Facility: HOSPITAL | Age: 80
End: 2020-03-19

## 2020-03-19 NOTE — TELEPHONE ENCOUNTER
Repeat egd rescheduled with pt due to covid 19, on Thursday, April 30, 2020.  Pt repeated date correctly.

## 2020-04-08 NOTE — BRIEF OP NOTE
S/p bilateral iliac venogram with IVUS guidance        60% ostial L CIV compression      Treatment        Bifurcating kissing stents   20 x 80 wallstent R CIV   22 x 70 Wallstent L CIV    Post dilated both with 18 x 40 mm balloons         Full report to follow             TELEPHONIC VISIT PROGRESS NOTE    This call was made to Brenton Lyman to discuss   Chief Complaint   Patient presents with   • Md Call   • Follow-up     3 month follow up for HTN and DM.     She is an established patient of mine.  She is in [place] and her identity has been established.   Brenton understands that we are limiting office visits due to the coronavirus pandemic and she consents to a virtual visit with charges submitted to her insurance.     CHIEF COMPLAINT  Md Call and Follow-up (3 month follow up for HTN and DM.)      SUBJECTIVE  The patient is a 79 year old female who is being evaluated via a Telephonic Visit for :  1. Diabetes mellitus:  Patient currently on metformin 1000 mg b.i.d. and Lantus insulin 32 units in the morning and 25 units in the evening.  Patient stated since she started taking the antibiotic Keflex for left big toe swelling, she noticed her sugar numbers are going a little higher.  She reports no hypoglycemic episodes.  Last A1 c was 7.7 % in January of 2020.  2. Upset stomach from taking the antibiotic Keflex.  Patient stated she took it a lot in the past with no problems now it is causing her upset stomach after taking 2 doses.  She is not sure if she took it with food or not.  She will continue taking it and will call the office back if continues to have problems.  3. Hypertension:  Denies chest pain, shortness of breath, palpitations, peripheral edema or dizziness.  Patient does not have a blood pressure machine at home to check blood pressure.  She is taking her medications losartan and hydrochlorothiazide as prescribed.  4. Bowel  problems :  Patient reports constipation.  She started taking over-the-counter stool softener which helped she is wondering if she can continue taking it.  She stop taking the Metamucil.  No abdominal pain.  5. Diabetic neuropathy and peripheral vascular disease:  No new ulceration.  She had a little redness in the left big toe.  It is getting better  after she started the antibiotic.  No discoloration no pain.  Her previous podiatrist retired and would like to have a referral to a new 1.  6. Stress:  She is staying most of the time inside the apartment due to the COVID-19  Pandemic.  Her son with pancreas cancer is still living, she talks to him on the phone but she cannot visit him.    REVIEW OF SYSTEMS  All systems reviewed and are negative with the exception of the findings noted in the history of present illness.     PHYSICAL EXAM  There were no vitals filed for this visit.   She is alert,  with fluent speech  No respiratory distress during the time of conversation.  Denies shortness of breath or cough.    ASSESSMENT/PLAN  Diabetic polyneuropathy associated with type 2 diabetes mellitus (CMS/[facility])  - SERVICE TO PODIATRY    Type 2 diabetes mellitus with other circulatory complication, with long-term current use of insulin (CMS/[facility])  - COMPREHENSIVE METABOLIC PANEL; Future  - LIPID PANEL WITH REFLEX; Future  - MICROALBUMIN URINE RANDOM; Future  - THYROID STIMULATING HORMONE REFLEX; Future  - VITAMIN B12 AND FOLATE; Future  - GLYCOHEMOGLOBIN; Future  - SERVICE TO PODIATRY    Benign essential HTN  - CBC WITH DIFFERENTIAL; Future  - COMPREHENSIVE METABOLIC PANEL; Future  - LIPID PANEL WITH REFLEX; Future  - MICROALBUMIN URINE RANDOM; Future  - THYROID STIMULATING HORMONE REFLEX; Future    Osteopenia, unspecified location  - CBC WITH DIFFERENTIAL; Future  - PARATHYROID HORMONE INTACT WITHOUT CALCIUM; Future  - VITAMIN D -25 HYDROXY; Future    Iron deficiency anemia, unspecified iron deficiency anemia type  - CBC WITH DIFFERENTIAL; Future    CKD (chronic kidney disease) stage 3, GFR 30-59 ml/min (CMS/[facility])  - CBC WITH DIFFERENTIAL; Future  - COMPREHENSIVE METABOLIC PANEL; Future  - MICROALBUMIN URINE RANDOM; Future  - PARATHYROID HORMONE INTACT WITHOUT CALCIUM; Future  - VITAMIN D -25 HYDROXY; Future    Peripheral vascular disease (CMS/[facility])  Patient advised to  continue her insulin at the current dosage.  Continue blood pressure medications.  Continue checking blood sugar.  If numbers are consistently high to call back for insulin dose adjustment.  Continue Keflex try to take it with food.  Call back if continues to have problems with it.  Tentatively she should be seen in the office in 3 months preceded by labs.  Depends on the situation with COVID-19    FOLLOW UP  Return in about 3 months (around 7/8/2020), or Dm,HTN,MWV .    I spent 11-20 minutes in telephone discussion within this encounter.

## 2020-04-09 ENCOUNTER — TELEPHONE (OUTPATIENT)
Dept: INTERNAL MEDICINE | Facility: CLINIC | Age: 80
End: 2020-04-09

## 2020-04-09 DIAGNOSIS — K31.83 GASTRIC ACIDITY, ABSENT: Primary | ICD-10-CM

## 2020-04-09 DIAGNOSIS — N18.30 CHRONIC KIDNEY DISEASE, STAGE 3: Primary | ICD-10-CM

## 2020-04-09 DIAGNOSIS — N18.30 CKD (CHRONIC KIDNEY DISEASE) STAGE 3, GFR 30-59 ML/MIN: ICD-10-CM

## 2020-04-09 DIAGNOSIS — D50.0 BLOOD LOSS ANEMIA: ICD-10-CM

## 2020-04-09 NOTE — TELEPHONE ENCOUNTER
----- Message from Edward Wilkerson MD sent at 4/9/2020  8:55 AM CDT -----  Please have the patient schedule the following blood work at [facility] in 2 weeks in the and schedule a audio visit with me to review results    comprehensive metabolic panel  ferritin  Vitamin D  b12

## 2020-04-09 NOTE — TELEPHONE ENCOUNTER
Spoke with patient to advised him to go to [facility] to have lab work. Patient was informed of appointment changing into a audio visit

## 2020-04-09 NOTE — TELEPHONE ENCOUNTER
----- Message from Madelyn Guzman sent at 4/9/2020 12:50 PM CDT -----  Contact: [phone]/ self   Patient called in returning your call. Please advise.

## 2020-04-09 NOTE — TELEPHONE ENCOUNTER
Raltegravir (RTG) was picked up from pharmacy 2/19/20  Prior ARV regimen Symtuza (DRVc/FTC/TAF) + EFV.     Patient is completed blood work April 6, 2020. Outside HIV specialist states that serum creatinine was 2.03. eGFR 30.  Hemoglobin 10.     eGFR non-AA around 35 to 40 since Dec 2019. Recent change to eGFR 30 based on outside lab is within the known margin of effect for RTG within a few weeks of starting medication which on average is 8-10 eGFR decrease.     Increase in serum creatinine may be due to the new agent Raltegravir. We will repeat his sCr in 2 weeks to ensure stability.

## 2020-04-13 ENCOUNTER — TELEPHONE (OUTPATIENT)
Dept: NEUROLOGY | Facility: HOSPITAL | Age: 80
End: 2020-04-13

## 2020-04-16 ENCOUNTER — TELEPHONE (OUTPATIENT)
Dept: PHARMACY | Facility: CLINIC | Age: 80
End: 2020-04-16

## 2020-04-16 NOTE — TELEPHONE ENCOUNTER
Refill call regarding Pralient from [facility]. Shipping out Praluent on  for  arrival with patients consent. Copay of $0 @ 004. Address and  confirmed.

## 2020-04-28 ENCOUNTER — OFFICE VISIT (OUTPATIENT)
Dept: INTERNAL MEDICINE | Facility: CLINIC | Age: 80
End: 2020-04-28
Payer: MEDICARE

## 2020-04-28 DIAGNOSIS — I10 ESSENTIAL HYPERTENSION: Primary | ICD-10-CM

## 2020-04-28 DIAGNOSIS — N18.30 CKD (CHRONIC KIDNEY DISEASE) STAGE 3, GFR 30-59 ML/MIN: ICD-10-CM

## 2020-04-28 DIAGNOSIS — Z71.89 ADVICE GIVEN ABOUT COVID-19 VIRUS BY TELEPHONE: ICD-10-CM

## 2020-04-28 PROCEDURE — 99442 PR PHYSICIAN TELEPHONE EVALUATION 11-20 MIN: CPT | Mod: 95,,, | Performed by: INTERNAL MEDICINE

## 2020-04-28 PROCEDURE — 99442 PR PHYSICIAN TELEPHONE EVALUATION 11-20 MIN: ICD-10-PCS | Mod: 95,,, | Performed by: INTERNAL MEDICINE

## 2020-04-28 RX ORDER — RITONAVIR 100 MG/1
100 TABLET ORAL DAILY
COMMUNITY
End: 2021-01-20 | Stop reason: CLARIF

## 2020-04-28 NOTE — PROGRESS NOTES
The patient location is: home  Visit type: Virtual visit with synchronous audio   Total time spent with patient: 20 min    Each patient to whom he or she provides medical services by telemedicine is:  (1) informed of the relationship between the physician and patient and the respective role of any other health care provider with respect to management of the patient; and (2) notified that he or she may decline to receive medical services by telemedicine and may withdraw from such care at any time.       Portions of this note are generated with voice recognition software. Typographical errors may exist.     SUBJECTIVE:    This is a/an 79 y.o. male here for primary care visit for  Chief Complaint   Patient presents with    Hypertension       Patient overall remains fairly sedentary.  His wife has an exercise bicycle but he is ambivalent about using it.  Finds that it does not adequately exert him and he feels that he does not have any major orthopedic limitations to do jumping rope another preferred aerobic activities.  States that he will try to do more if this since he does not want to spend too much time outdoors during COVID-19.      Does have more dietary indiscretions he can work on and he would really like to proceed with this instead of adding more medication.  Overall he is very dissatisfied with the degree of polypharmacy in his life.    Home Contacts  Adult daughter.  Works at [employer] but is working remotely and has not been in contact with other employees or patient  Wife               Medications Reviewed and Updated    Past medical, family, and social histories were reviewed and updated.    Review of Systems negative unless otherwise noted in history of present illness-  ROS    General ROS: negative  Psychological ROS: negative  ENT ROS: negative  Endocrine ROS: Negative  Allergy and Immunology ROS: negative  Cardiovascular ROS: negative  Pulmonary ROS: Negative  Gastrointestinal ROS:  negative  Genito-Urinary ROS: negative  Musculoskeletal ROS: negative      Allergic:    Review of patient's allergies indicates:   Allergen Reactions    Lipitor [atorvastatin] Other (See Comments)     myalgia       OBJECTIVE:         Wt Readings from Last 3 Encounters:   03/18/20 92.1 kg (203 lb)   02/13/20 87.8 kg (193 lb 9 oz)   01/28/20 92.7 kg (204 lb 5.9 oz)    There is no height or weight on file to calculate BMI.  Previous Blood Pressure Readings :   BP Readings from Last 3 Encounters:   03/18/20 121/61   02/14/20 (!) 117/58   01/28/20 114/62       Physical Exam    Pertinent Labs Reviewed       ASSESSMENT/PLAN:    Essential hypertension.Condition stable.  Counseling given today on self-care measures. Plan to monitor clinically. Continue current medical plan.     CKD (chronic kidney disease) stage 3, GFR 30-59 ml/min .Condition stable.  Counseling given today on self-care measures. Plan to monitor clinically. Continue current medical plan.     Advice Given About Covid-19 Virus by Telephone      ASSESSMENT     Determined to remain at home: .Yes       PLAN   Future appointments postponed as indicated  Patient advised to have thermometer and home BP machine for COVID19 preparedness       Patient feeling well   Schedule future visit  During the week contact my office for new medical issues including COVID-19 symptoms [phone]   After hours or weekend told to contact Nurse Advice Line [phone]         Future Appointments   Date Time Provider Department Center   7/8/2020  9:15 AM Myah Perry MD [facility]       Edward Wilkerson  4/28/2020  10:00 AM

## 2020-04-29 ENCOUNTER — TELEPHONE (OUTPATIENT)
Dept: CARDIOLOGY | Facility: CLINIC | Age: 80
End: 2020-04-29

## 2020-04-29 NOTE — TELEPHONE ENCOUNTER
Reached out to pt in regards to cancelled appt due to COVID 19.  Informed that the [facility] is in the process of rescheduling appointments. He reports that he would be comfortable coming in to the clinic for a visit. Reviewed with him all safety measures in place, verbalized understanding. Appt set and reminder placed in the mail.

## 2020-05-06 DIAGNOSIS — K31.83 GASTRIC ACIDITY, ABSENT: ICD-10-CM

## 2020-05-06 DIAGNOSIS — N18.30 CKD (CHRONIC KIDNEY DISEASE) STAGE 3, GFR 30-59 ML/MIN: ICD-10-CM

## 2020-05-06 DIAGNOSIS — R73.03 PREDIABETES: Primary | ICD-10-CM

## 2020-05-22 ENCOUNTER — TELEPHONE (OUTPATIENT)
Dept: NEUROLOGY | Facility: HOSPITAL | Age: 80
End: 2020-05-22

## 2020-05-22 NOTE — TELEPHONE ENCOUNTER
----- Message from Ame Cárdenas sent at 5/22/2020 11:01 AM CDT -----  Contact: [phone] or [phone] /Self   GI  Patient states he is returning your call. Please advise.

## 2020-06-11 ENCOUNTER — TELEPHONE (OUTPATIENT)
Dept: PHARMACY | Facility: CLINIC | Age: 80
End: 2020-06-11

## 2020-06-11 NOTE — TELEPHONE ENCOUNTER
Refill call regarding Praluent at [facility].  Will prepare for shipment with patient consent on 6/15 to arrive .  Copay 0.00.  Patient has not started any new medications including OTC drugs. Patient has not had any medication/ dose or instruction changes. No new allergies or side effects reported with this shipment. Medication is being taken as prescribed by physician and properly stored. Two patient identifiers:  and Address verified. Patient has no questions or concerns for [facility].  Patient next injection .  No sharps needed.  (Mayuri - training)

## 2020-07-09 ENCOUNTER — TELEPHONE (OUTPATIENT)
Dept: PHARMACY | Facility: CLINIC | Age: 80
End: 2020-07-09

## 2020-07-17 ENCOUNTER — TELEPHONE (OUTPATIENT)
Dept: INTERNAL MEDICINE | Facility: CLINIC | Age: 80
End: 2020-07-17

## 2020-07-17 NOTE — TELEPHONE ENCOUNTER
Janet Robins will continue with her for HIV care and will start with  affiliated providers in the future.

## 2020-08-07 ENCOUNTER — TELEPHONE (OUTPATIENT)
Dept: PHARMACY | Facility: CLINIC | Age: 80
End: 2020-08-07

## 2020-08-16 ENCOUNTER — TELEPHONE (OUTPATIENT)
Dept: FAMILY MEDICINE | Facility: CLINIC | Age: 80
End: 2020-08-16

## 2020-08-16 NOTE — TELEPHONE ENCOUNTER
Mack Catalan: this gentleman is Russian speaking only, previously seen by Dr Wilkerson and needs a new PCP I believe.    I see a Tiffanie Barth mentioned now as his PCP, but can you please contact him to be sure he is established?     I received a message seeing if he could establish with me but my panel is closed now to new patients due to upcoming maternity leave.     I think he would be a good person for Dr Clark if he is needing an [facility] PCP-- can you please touch base with him and schedule him to establish care with Dr Clark if he needs new PCP? Thanks! ELISHA

## 2020-09-04 ENCOUNTER — TELEPHONE (OUTPATIENT)
Dept: PHARMACY | Facility: CLINIC | Age: 80
End: 2020-09-04

## 2020-10-02 ENCOUNTER — TELEPHONE (OUTPATIENT)
Dept: PHARMACY | Facility: CLINIC | Age: 80
End: 2020-10-02

## 2020-11-03 ENCOUNTER — SPECIALTY PHARMACY (OUTPATIENT)
Dept: PHARMACY | Facility: CLINIC | Age: 80
End: 2020-11-03

## 2020-11-03 NOTE — TELEPHONE ENCOUNTER
Specialty Pharmacy - Refill Coordination    Specialty Medication Orders Linked to Encounter      Most Recent Value   Medication #1  alirocumab (PRALUENT PEN) 150 mg/mL PnIj (Order#[id number], Rx#[id number])          Refill Questions - Documented Responses      Most Recent Value   Relationship to patient of person spoken to?  Self   HIPAA/medical authority confirmed?  Yes   Any changes in contact preferences or allowed representatives?  No   Has the patient had any insurance changes?  No   Has the patient had any changes to specialty medication, dose, or instructions?  No   Has the patient started taking any new medications, herbals, or supplements?  No   Has the patient been diagnosed with any new medical conditions?  No   Does the patient have any new allergies to medications or foods?  No   Does the patient have any concerns about side effects?  No   Can the patient store medication/sharps container properly (at the correct temperature, away from children/pets, etc.)?  Yes   Can the patient call emergency services (911) in the event of an emergency?  Yes   Does the patient have any concerns or questions about taking or administering this medication as prescribed?  No   How many doses did the patient miss in the past 4 weeks or since the last fill?  0   How many doses does the patient have on hand?  0   How many days does the patient report on hand quantity will last?  0   Does the number of doses/days supply remaining match pharmacy expected amounts?  Yes   How will the patient receive the medication?  Mail   When does the patient need to receive the medication?  11/05/20   Shipping Address  Home   Address in [facility] confirmed and updated if neccessary?  Yes   Expected Copay ($)  0   Is the patient able to afford the medication copay?  Yes   Payment Method  zero copay   Days supply of Refill  28   Would patient like to speak to a pharmacist?  No   Do you want to trigger an intervention?  No   Do you want  to trigger an additional referral task?  No   Refill activity completed?  Yes   Refill activity plan  Refill scheduled   Shipment/Pickup Date:  11/03/20          Current Outpatient Medications   Medication Sig    alirocumab (PRALUENT PEN) 150 mg/mL PnIj Inject 1 mL (150 mg total) into the skin every 14 (fourteen) days.    allopurinol (ZYLOPRIM) 100 MG tablet Take 1 tablet (100 mg total) by mouth once daily.    apixaban (ELIQUIS) 2.5 mg Tab Take 1 tablet (2.5 mg total) by mouth 2 (two) times daily.    aspirin (ECOTRIN) 81 MG EC tablet Take 81 mg by mouth once daily.    brimonidine 0.1% (ALPHAGAN P) 0.1 % Drop Place 1 drop into both eyes 2 (two) times daily.     carvedilol (COREG) 3.125 MG tablet Take 1 tablet (3.125 mg total) by mouth 2 (two) times daily.    CHOLECALCIFEROL, VITAMIN D3, (VITAMIN D3 ORAL) Take 50,000 Units by mouth every Sunday.     ergocalciferol (ERGOCALCIFEROL) 50,000 unit Cap Take 50,000 Units by mouth every 7 days.    furosemide (LASIX) 80 MG tablet Take 80 mg by mouth daily as needed.    latanoprost 0.005 % ophthalmic solution 1 drop every evening.    NIFEdipine (PROCARDIA-XL) 30 MG (OSM) 24 hr tablet Take 1 tablet (30 mg total) by mouth once daily. (jazzmine presion) HOLD for BP < 100    omeprazole (PRILOSEC) 40 MG capsule Take 1 capsule (40 mg total) by mouth 2 (two) times daily before meals.    raltegravir (ISENTRESS HD) 600 mg tablet Take 600 mg by mouth 2 (two) times daily.     ritonavir (NORVIR) 100 mg Tab tablet Take 100 mg by mouth once daily.    sodium bicarbonate 650 MG tablet Take 1 tablet (650 mg total) by mouth once daily.   Last reviewed on 10/14/2020  9:27 AM by Myah Perry MD    Review of patient's allergies indicates:   Allergen Reactions    Lipitor [atorvastatin] Other (See Comments)     myalgia    Last reviewed on  10/14/2020 9:27 AM by Myah Perry      Tasks added this encounter   11/26/2020 - Refill Call (Auto Added)   Tasks due within next 3 months    No tasks due.     Mayuri Villa  [facility]  [address] [address]  Phone: [phone]  Fax: [phone]

## 2020-12-01 ENCOUNTER — SPECIALTY PHARMACY (OUTPATIENT)
Dept: PHARMACY | Facility: CLINIC | Age: 80
End: 2020-12-01

## 2020-12-01 NOTE — TELEPHONE ENCOUNTER
Specialty Pharmacy - Refill Coordination    Specialty Medication Orders Linked to Encounter      Most Recent Value   Medication #1  alirocumab (PRALUENT PEN) 150 mg/mL PnIj (Order#[id number], Rx#[id number])          Refill Questions - Documented Responses      Most Recent Value   Relationship to patient of person spoken to?  Self   HIPAA/medical authority confirmed?  Yes   How will the patient receive the medication?  Mail   When does the patient need to receive the medication?  12/03/20   Shipping Address  Home   Address in [facility] confirmed and updated if neccessary?  Yes   Expected Copay ($)  0   Is the patient able to afford the medication copay?  Yes   Payment Method  zero copay   Days supply of Refill  28   Would patient like to speak to a pharmacist?  No   Do you want to trigger an intervention?  No   Do you want to trigger an additional referral task?  No   Refill activity completed?  Yes   Refill activity plan  Refill scheduled   Shipment/Pickup Date:  12/01/20          Current Outpatient Medications   Medication Sig    alirocumab (PRALUENT PEN) 150 mg/mL PnIj Inject 1 mL (150 mg total) into the skin every 14 (fourteen) days.    allopurinol (ZYLOPRIM) 100 MG tablet Take 1 tablet (100 mg total) by mouth once daily.    apixaban (ELIQUIS) 2.5 mg Tab Take 1 tablet (2.5 mg total) by mouth 2 (two) times daily.    aspirin (ECOTRIN) 81 MG EC tablet Take 81 mg by mouth once daily.    brimonidine 0.1% (ALPHAGAN P) 0.1 % Drop Place 1 drop into both eyes 2 (two) times daily.     carvedilol (COREG) 3.125 MG tablet Take 1 tablet (3.125 mg total) by mouth 2 (two) times daily.    CHOLECALCIFEROL, VITAMIN D3, (VITAMIN D3 ORAL) Take 50,000 Units by mouth every Sunday.     ergocalciferol (ERGOCALCIFEROL) 50,000 unit Cap Take 50,000 Units by mouth every 7 days.    furosemide (LASIX) 80 MG tablet Take 80 mg by mouth daily as needed.    latanoprost 0.005 % ophthalmic solution 1 drop every evening.     NIFEdipine (PROCARDIA-XL) 30 MG (OSM) 24 hr tablet Take 1 tablet (30 mg total) by mouth once daily. (jazzmine presion) HOLD for BP < 100    omeprazole (PRILOSEC) 40 MG capsule Take 1 capsule (40 mg total) by mouth 2 (two) times daily before meals.    raltegravir (ISENTRESS HD) 600 mg tablet Take 600 mg by mouth 2 (two) times daily.     ritonavir (NORVIR) 100 mg Tab tablet Take 100 mg by mouth once daily.    sodium bicarbonate 650 MG tablet Take 1 tablet (650 mg total) by mouth once daily.   Last reviewed on 10/14/2020  9:27 AM by Myah Perry MD    Review of patient's allergies indicates:   Allergen Reactions    Lipitor [atorvastatin] Other (See Comments)     myalgia    Last reviewed on  10/14/2020 9:27 AM by Myah Perry      Tasks added this encounter   No tasks added.   Tasks due within next 3 months   11/26/2020 - Refill Call (Auto Added)     Nena Nino  [facility] - Specialty Pharm[facility]  [address] [place] [id number]  Phone: [phone]  Fax: [phone]

## 2021-01-04 ENCOUNTER — TELEPHONE (OUTPATIENT)
Dept: PHARMACY | Facility: CLINIC | Age: 81
End: 2021-01-04

## 2021-01-05 DIAGNOSIS — I25.10 CORONARY ARTERY DISEASE, ANGINA PRESENCE UNSPECIFIED, UNSPECIFIED VESSEL OR LESION TYPE, UNSPECIFIED WHETHER NATIVE OR TRANSPLANTED HEART: ICD-10-CM

## 2021-01-13 DIAGNOSIS — I25.10 CORONARY ARTERY DISEASE, ANGINA PRESENCE UNSPECIFIED, UNSPECIFIED VESSEL OR LESION TYPE, UNSPECIFIED WHETHER NATIVE OR TRANSPLANTED HEART: ICD-10-CM

## 2021-01-15 DIAGNOSIS — I25.10 CORONARY ARTERY DISEASE, ANGINA PRESENCE UNSPECIFIED, UNSPECIFIED VESSEL OR LESION TYPE, UNSPECIFIED WHETHER NATIVE OR TRANSPLANTED HEART: ICD-10-CM

## 2021-01-19 DIAGNOSIS — I25.10 CORONARY ARTERY DISEASE, ANGINA PRESENCE UNSPECIFIED, UNSPECIFIED VESSEL OR LESION TYPE, UNSPECIFIED WHETHER NATIVE OR TRANSPLANTED HEART: ICD-10-CM

## 2021-01-20 ENCOUNTER — SPECIALTY PHARMACY (OUTPATIENT)
Dept: PHARMACY | Facility: CLINIC | Age: 81
End: 2021-01-20

## 2021-02-05 ENCOUNTER — SPECIALTY PHARMACY (OUTPATIENT)
Dept: PHARMACY | Facility: CLINIC | Age: 81
End: 2021-02-05

## 2021-02-05 DIAGNOSIS — I25.10 CORONARY ARTERY DISEASE INVOLVING NATIVE CORONARY ARTERY OF NATIVE HEART WITHOUT ANGINA PECTORIS: Primary | ICD-10-CM

## 2021-03-01 ENCOUNTER — SPECIALTY PHARMACY (OUTPATIENT)
Dept: PHARMACY | Facility: CLINIC | Age: 81
End: 2021-03-01

## 2021-03-29 ENCOUNTER — SPECIALTY PHARMACY (OUTPATIENT)
Dept: PHARMACY | Facility: CLINIC | Age: 81
End: 2021-03-29

## 2021-04-26 ENCOUNTER — SPECIALTY PHARMACY (OUTPATIENT)
Dept: PHARMACY | Facility: CLINIC | Age: 81
End: 2021-04-26

## 2021-05-24 ENCOUNTER — SPECIALTY PHARMACY (OUTPATIENT)
Dept: PHARMACY | Facility: CLINIC | Age: 81
End: 2021-05-24

## 2021-06-24 ENCOUNTER — SPECIALTY PHARMACY (OUTPATIENT)
Dept: PHARMACY | Facility: CLINIC | Age: 81
End: 2021-06-24

## 2021-07-22 ENCOUNTER — SPECIALTY PHARMACY (OUTPATIENT)
Dept: PHARMACY | Facility: CLINIC | Age: 81
End: 2021-07-22

## 2021-08-23 ENCOUNTER — SPECIALTY PHARMACY (OUTPATIENT)
Dept: PHARMACY | Facility: CLINIC | Age: 81
End: 2021-08-23

## 2021-09-30 ENCOUNTER — SPECIALTY PHARMACY (OUTPATIENT)
Dept: PHARMACY | Facility: CLINIC | Age: 81
End: 2021-09-30

## 2021-10-22 ENCOUNTER — SPECIALTY PHARMACY (OUTPATIENT)
Dept: PHARMACY | Facility: CLINIC | Age: 81
End: 2021-10-22
Payer: MEDICARE

## 2021-11-26 ENCOUNTER — SPECIALTY PHARMACY (OUTPATIENT)
Dept: PHARMACY | Facility: CLINIC | Age: 81
End: 2021-11-26
Payer: MEDICARE

## 2021-12-22 ENCOUNTER — SPECIALTY PHARMACY (OUTPATIENT)
Dept: PHARMACY | Facility: CLINIC | Age: 81
End: 2021-12-22
Payer: MEDICARE

## 2022-01-26 ENCOUNTER — SPECIALTY PHARMACY (OUTPATIENT)
Dept: PHARMACY | Facility: CLINIC | Age: 82
End: 2022-01-26
Payer: MEDICARE

## 2022-02-08 NOTE — TELEPHONE ENCOUNTER
[id number]. Praluent approved until 12/31/22.    Pt does not want to fill Praluent due to rash, itching. Last injection was 1/13.  He is following up with MD regarding this. Pt will call [facility] if he will restart medication after talking with MD. Closing out refill activity at this time. Will re-open if pt reaches out to [facility] to schedule refill if he will resume therapy

## 2022-11-17 NOTE — TELEPHONE ENCOUNTER
We can do it without sedation        Thanks      ZN  
----- Message from Dennys Nicole sent at 4/12/2018 12:51 PM CDT -----  Contact: [phone]/self  Pt would like to know if he can drive after is procedure  Please call and advise   
----- Message from Milly Mujica sent at 4/12/2018 12:11 PM CDT -----  Contact: [phone]/self  Patient is requesting a call back regarding the time and instructions for his procedure. Please advise.      
No answer.   
Patient notified.   
Patient states he does not have anyone to drive him home after EVLT.    Would it be ok for him to drive after procedure?   
100

## 2023-04-20 ENCOUNTER — HOSPITAL ENCOUNTER (OUTPATIENT)
Dept: RADIOLOGY | Facility: HOSPITAL | Age: 83
Discharge: HOME | End: 2023-04-20
Attending: INTERNAL MEDICINE
Payer: MEDICARE

## 2023-04-20 DIAGNOSIS — R60.0 EDEMA OF LEFT LOWER LEG: ICD-10-CM

## 2023-04-20 PROCEDURE — 93971 EXTREMITY STUDY: CPT | Mod: TC,PO,LT

## 2023-04-20 PROCEDURE — 93971 EXTREMITY STUDY: CPT | Mod: 26,LT,, | Performed by: STUDENT IN AN ORGANIZED HEALTH CARE EDUCATION/TRAINING PROGRAM

## 2023-04-20 PROCEDURE — 93971 US LOWER EXTREMITY VEINS LEFT: ICD-10-PCS | Mod: 26,LT,, | Performed by: STUDENT IN AN ORGANIZED HEALTH CARE EDUCATION/TRAINING PROGRAM

## 2023-08-08 ENCOUNTER — HOSPITAL ENCOUNTER (OUTPATIENT)
Dept: RADIOLOGY | Facility: HOSPITAL | Age: 83
Discharge: HOME | End: 2023-08-08
Attending: INTERNAL MEDICINE
Payer: MEDICARE

## 2023-08-08 DIAGNOSIS — N17.9 AKI (ACUTE KIDNEY INJURY): ICD-10-CM

## 2023-08-08 PROCEDURE — 76770 US EXAM ABDO BACK WALL COMP: CPT | Mod: 26,,, | Performed by: RADIOLOGY

## 2023-08-08 PROCEDURE — 76770 US RETROPERITONEAL COMPLETE: ICD-10-PCS | Mod: 26,,, | Performed by: RADIOLOGY

## 2023-08-08 PROCEDURE — 76770 US EXAM ABDO BACK WALL COMP: CPT | Mod: TC,PO

## 2023-08-18 PROCEDURE — 86361 T CELL ABSOLUTE COUNT: CPT | Performed by: INTERNAL MEDICINE

## 2023-08-21 ENCOUNTER — LAB REQUISITION (OUTPATIENT)
Dept: LAB | Facility: HOSPITAL | Age: 83
End: 2023-08-21
Payer: MEDICARE

## 2023-08-21 DIAGNOSIS — B20 HUMAN IMMUNODEFICIENCY VIRUS (HIV) DISEASE: ICD-10-CM

## 2023-08-21 DIAGNOSIS — N18.32 CHRONIC KIDNEY DISEASE, STAGE 3B: ICD-10-CM

## 2023-08-22 LAB
AGE: 85
CD3+CD4+ CELLS # SPEC: 1342.98 UNIT/L (ref 589–1505)
CD3+CD4+ CELLS NFR BLD: 37.1 %
LYMPHOCYTES # BLD AUTO: 3619.88 X10(3)/MCL (ref 1260–5520)
LYMPHOCYTES NFR LN MANUAL: 41.8 % (ref 28–48)
LYMPHOMA - T-CELL MARKERS SPEC-IMP: NORMAL
WBC # BLD AUTO: 8660 /MM3 (ref 4500–11500)

## 2023-09-17 NOTE — TELEPHONE ENCOUNTER
Patient returned call. He was unable to complete consultation, injection training and confirm shipment at this time. He will check with his wife and call [facility] back to schedule a consult time/date.     He currently questions which medications he can discontinue now that Repatha was approved. He thinks Dr. Ramos mentioned two drugs, but could not remember. Routing provider to advise. TTN   17-Sep-2023 21:08

## 2024-01-26 ENCOUNTER — LAB VISIT (OUTPATIENT)
Dept: LAB | Facility: HOSPITAL | Age: 84
End: 2024-01-26
Attending: INTERNAL MEDICINE
Payer: MEDICARE

## 2024-01-26 DIAGNOSIS — B20 HIV DISEASE: ICD-10-CM

## 2024-01-26 DIAGNOSIS — N18.4 ANEMIA IN STAGE 4 CHRONIC KIDNEY DISEASE: ICD-10-CM

## 2024-01-26 DIAGNOSIS — N18.4 CKD (CHRONIC KIDNEY DISEASE) STAGE 4, GFR 15-29 ML/MIN: ICD-10-CM

## 2024-01-26 DIAGNOSIS — D63.1 ANEMIA IN STAGE 4 CHRONIC KIDNEY DISEASE: ICD-10-CM

## 2024-01-26 LAB
25(OH)D3+25(OH)D2 SERPL-MCNC: 19 NG/ML (ref 30–96)
ALBUMIN SERPL BCP-MCNC: 3.7 G/DL (ref 3.5–5.2)
ANION GAP SERPL CALC-SCNC: 7 MMOL/L (ref 8–16)
BASOPHILS # BLD AUTO: 0.05 K/UL (ref 0–0.2)
BASOPHILS NFR BLD: 0.6 % (ref 0–1.9)
CALCIUM SERPL-MCNC: 8.6 MG/DL (ref 8.7–10.5)
CHLORIDE SERPL-SCNC: 105 MMOL/L (ref 95–110)
CO2 SERPL-SCNC: 27 MMOL/L (ref 23–29)
CREAT SERPL-MCNC: 2.25 MG/DL (ref 0.5–1.4)
DIFFERENTIAL METHOD BLD: ABNORMAL
EOSINOPHIL # BLD AUTO: 0.5 K/UL (ref 0–0.5)
EOSINOPHIL NFR BLD: 5.3 % (ref 0–8)
ERYTHROCYTE [DISTWIDTH] IN BLOOD BY AUTOMATED COUNT: 14.5 % (ref 11.5–14.5)
EST. GFR  (NO RACE VARIABLE): 27.9 ML/MIN/1.73 M^2
FERRITIN SERPL-MCNC: 462 NG/ML (ref 20–300)
GLUCOSE SERPL-MCNC: 95 MG/DL (ref 70–110)
HCT VFR BLD AUTO: 34.3 % (ref 40–54)
HGB BLD-MCNC: 11 G/DL (ref 14–18)
IMM GRANULOCYTES # BLD AUTO: 0.03 K/UL (ref 0–0.04)
IMM GRANULOCYTES NFR BLD AUTO: 0.4 % (ref 0–0.5)
IRON SERPL-MCNC: 94 UG/DL (ref 45–160)
LYMPHOCYTES # BLD AUTO: 3.4 K/UL (ref 1–4.8)
LYMPHOCYTES NFR BLD: 40.6 % (ref 18–48)
MAGNESIUM SERPL-MCNC: 1.8 MG/DL (ref 1.6–2.6)
MCH RBC QN AUTO: 30 PG (ref 27–31)
MCHC RBC AUTO-ENTMCNC: 32.1 G/DL (ref 32–36)
MCV RBC AUTO: 94 FL (ref 82–98)
MONOCYTES # BLD AUTO: 0.6 K/UL (ref 0.3–1)
MONOCYTES NFR BLD: 7.5 % (ref 4–15)
NEUTROPHILS # BLD AUTO: 3.9 K/UL (ref 1.8–7.7)
NEUTROPHILS NFR BLD: 45.6 % (ref 38–73)
NRBC BLD-RTO: 0 /100 WBC
PHOSPHATE SERPL-MCNC: 3.5 MG/DL (ref 2.7–4.5)
PLATELET # BLD AUTO: 193 K/UL (ref 150–450)
PMV BLD AUTO: 10.9 FL (ref 9.2–12.9)
POTASSIUM SERPL-SCNC: 4.2 MMOL/L (ref 3.5–5.1)
PTH-INTACT SERPL-MCNC: 137 PG/ML (ref 9–77)
RBC # BLD AUTO: 3.67 M/UL (ref 4.6–6.2)
SATURATED IRON: 31 % (ref 20–50)
SODIUM SERPL-SCNC: 139 MMOL/L (ref 136–145)
TOTAL IRON BINDING CAPACITY: 308 UG/DL (ref 250–450)
TRANSFERRIN SERPL-MCNC: 208 MG/DL (ref 200–375)
URATE SERPL-MCNC: 7.9 MG/DL (ref 3.4–7)
UUN UR-MCNC: 37 MG/DL (ref 2–20)
WBC # BLD AUTO: 8.43 K/UL (ref 3.9–12.7)

## 2024-01-26 PROCEDURE — 36415 COLL VENOUS BLD VENIPUNCTURE: CPT | Mod: PN | Performed by: INTERNAL MEDICINE

## 2024-01-26 PROCEDURE — 83970 ASSAY OF PARATHORMONE: CPT | Mod: PN | Performed by: INTERNAL MEDICINE

## 2024-01-26 PROCEDURE — 82306 VITAMIN D 25 HYDROXY: CPT | Mod: PN | Performed by: INTERNAL MEDICINE

## 2024-01-26 PROCEDURE — 82728 ASSAY OF FERRITIN: CPT | Performed by: INTERNAL MEDICINE

## 2024-01-26 PROCEDURE — 83735 ASSAY OF MAGNESIUM: CPT | Mod: PN | Performed by: INTERNAL MEDICINE

## 2024-01-26 PROCEDURE — 85025 COMPLETE CBC W/AUTO DIFF WBC: CPT | Mod: PN | Performed by: INTERNAL MEDICINE

## 2024-01-26 PROCEDURE — 83540 ASSAY OF IRON: CPT | Mod: PN | Performed by: INTERNAL MEDICINE

## 2024-01-26 PROCEDURE — 84550 ASSAY OF BLOOD/URIC ACID: CPT | Performed by: INTERNAL MEDICINE

## 2024-01-26 PROCEDURE — 80069 RENAL FUNCTION PANEL: CPT | Mod: PN | Performed by: INTERNAL MEDICINE

## 2024-01-26 PROCEDURE — 87536 HIV-1 QUANT&REVRSE TRNSCRPJ: CPT | Mod: PN | Performed by: INTERNAL MEDICINE

## 2024-01-29 LAB
HIV1 RNA # SERPL NAA+PROBE: <20 COPIES/ML
HIV1 RNA SERPL NAA+PROBE-LOG#: <1.3 LOG COPIES/ML
HIV1 RNA SERPL QL NAA+PROBE: DETECTED

## 2024-04-19 ENCOUNTER — LAB VISIT (OUTPATIENT)
Dept: LAB | Facility: HOSPITAL | Age: 84
End: 2024-04-19
Attending: INTERNAL MEDICINE
Payer: MEDICARE

## 2024-04-19 DIAGNOSIS — N18.4 CKD (CHRONIC KIDNEY DISEASE) STAGE 4, GFR 15-29 ML/MIN: ICD-10-CM

## 2024-04-19 LAB
ALBUMIN/CREAT UR: 947.4 UG/MG (ref 0–30)
BACTERIA #/AREA URNS AUTO: ABNORMAL /HPF
BILIRUB UR QL STRIP: NEGATIVE
CLARITY UR REFRACT.AUTO: CLEAR
COLOR UR AUTO: YELLOW
CREAT UR-MCNC: 76 MG/DL (ref 23–375)
CREAT UR-MCNC: 76 MG/DL (ref 23–375)
GLUCOSE UR QL STRIP: NEGATIVE
HGB UR QL STRIP: ABNORMAL
HYALINE CASTS UR QL AUTO: 0 /LPF
KETONES UR QL STRIP: NEGATIVE
LEUKOCYTE ESTERASE UR QL STRIP: ABNORMAL
MICROALBUMIN UR DL<=1MG/L-MCNC: 720 UG/ML
MICROSCOPIC COMMENT: ABNORMAL
NITRITE UR QL STRIP: NEGATIVE
PH UR STRIP: 6 [PH] (ref 5–8)
PROT UR QL STRIP: ABNORMAL
PROT UR-MCNC: 97 MG/DL (ref 0–15)
PROT/CREAT UR: 1.28 MG/G{CREAT} (ref 0–0.2)
RBC #/AREA URNS AUTO: 2 /HPF (ref 0–4)
SP GR UR STRIP: 1.01 (ref 1–1.03)
URN SPEC COLLECT METH UR: ABNORMAL
UROBILINOGEN UR STRIP-ACNC: NEGATIVE EU/DL
WBC #/AREA URNS AUTO: 30 /HPF (ref 0–5)
WBC CLUMPS UR QL AUTO: ABNORMAL

## 2024-04-19 PROCEDURE — 81000 URINALYSIS NONAUTO W/SCOPE: CPT | Mod: PN | Performed by: INTERNAL MEDICINE

## 2024-04-19 PROCEDURE — 82043 UR ALBUMIN QUANTITATIVE: CPT | Mod: PN | Performed by: INTERNAL MEDICINE

## 2024-04-19 PROCEDURE — 84156 ASSAY OF PROTEIN URINE: CPT | Performed by: INTERNAL MEDICINE

## 2024-04-24 ENCOUNTER — LAB VISIT (OUTPATIENT)
Dept: LAB | Facility: HOSPITAL | Age: 84
End: 2024-04-24
Attending: INTERNAL MEDICINE
Payer: MEDICARE

## 2024-04-24 DIAGNOSIS — E87.5 HYPERKALEMIA: ICD-10-CM

## 2024-04-24 LAB
ALBUMIN SERPL BCP-MCNC: 4 G/DL (ref 3.5–5.2)
ANION GAP SERPL CALC-SCNC: 9 MMOL/L (ref 8–16)
CALCIUM SERPL-MCNC: 8.8 MG/DL (ref 8.7–10.5)
CHLORIDE SERPL-SCNC: 108 MMOL/L (ref 95–110)
CO2 SERPL-SCNC: 19 MMOL/L (ref 23–29)
CREAT SERPL-MCNC: 3.77 MG/DL (ref 0.5–1.4)
EST. GFR  (NO RACE VARIABLE): 15 ML/MIN/1.73 M^2
GLUCOSE SERPL-MCNC: 95 MG/DL (ref 70–110)
PHOSPHATE SERPL-MCNC: 4.3 MG/DL (ref 2.7–4.5)
POTASSIUM SERPL-SCNC: 6.8 MMOL/L (ref 3.5–5.1)
SODIUM SERPL-SCNC: 136 MMOL/L (ref 136–145)
UUN UR-MCNC: 69 MG/DL (ref 2–20)

## 2024-04-24 PROCEDURE — 36415 COLL VENOUS BLD VENIPUNCTURE: CPT | Mod: PN | Performed by: INTERNAL MEDICINE

## 2024-04-24 PROCEDURE — 80069 RENAL FUNCTION PANEL: CPT | Mod: PN | Performed by: INTERNAL MEDICINE

## 2024-04-25 ENCOUNTER — HOSPITAL ENCOUNTER (EMERGENCY)
Facility: HOSPITAL | Age: 84
Discharge: HOME | End: 2024-04-25
Attending: EMERGENCY MEDICINE
Payer: MEDICARE

## 2024-04-25 VITALS
TEMPERATURE: 98 F | SYSTOLIC BLOOD PRESSURE: 126 MMHG | DIASTOLIC BLOOD PRESSURE: 58 MMHG | OXYGEN SATURATION: 98 % | HEART RATE: 59 BPM | BODY MASS INDEX: 25.46 KG/M2 | WEIGHT: 193 LBS | RESPIRATION RATE: 18 BRPM

## 2024-04-25 DIAGNOSIS — E87.5 HYPERKALEMIA: Primary | ICD-10-CM

## 2024-04-25 LAB
ALBUMIN SERPL BCP-MCNC: 3.9 G/DL (ref 3.5–5.2)
ANION GAP SERPL CALC-SCNC: 9 MMOL/L (ref 8–16)
BASOPHILS # BLD AUTO: 0.03 K/UL (ref 0–0.2)
BASOPHILS NFR BLD: 0.3 % (ref 0–1.9)
CALCIUM SERPL-MCNC: 8.4 MG/DL (ref 8.7–10.5)
CHLORIDE SERPL-SCNC: 111 MMOL/L (ref 95–110)
CO2 SERPL-SCNC: 17 MMOL/L (ref 23–29)
CREAT SERPL-MCNC: 3.06 MG/DL (ref 0.5–1.4)
DIFFERENTIAL METHOD BLD: ABNORMAL
EOSINOPHIL # BLD AUTO: 0.4 K/UL (ref 0–0.5)
EOSINOPHIL NFR BLD: 4 % (ref 0–8)
ERYTHROCYTE [DISTWIDTH] IN BLOOD BY AUTOMATED COUNT: 14.5 % (ref 11.5–14.5)
EST. GFR  (NO RACE VARIABLE): 19.3 ML/MIN/1.73 M^2
GLUCOSE SERPL-MCNC: 102 MG/DL (ref 70–110)
HCT VFR BLD AUTO: 30.1 % (ref 40–54)
HGB BLD-MCNC: 10.1 G/DL (ref 14–18)
IMM GRANULOCYTES # BLD AUTO: 0.02 K/UL (ref 0–0.04)
IMM GRANULOCYTES NFR BLD AUTO: 0.2 % (ref 0–0.5)
LYMPHOCYTES # BLD AUTO: 4 K/UL (ref 1–4.8)
LYMPHOCYTES NFR BLD: 46.2 % (ref 18–48)
MCH RBC QN AUTO: 30.7 PG (ref 27–31)
MCHC RBC AUTO-ENTMCNC: 33.6 G/DL (ref 32–36)
MCV RBC AUTO: 92 FL (ref 82–98)
MONOCYTES # BLD AUTO: 0.7 K/UL (ref 0.3–1)
MONOCYTES NFR BLD: 7.6 % (ref 4–15)
NEUTROPHILS # BLD AUTO: 3.6 K/UL (ref 1.8–7.7)
NEUTROPHILS NFR BLD: 41.7 % (ref 38–73)
NRBC BLD-RTO: 0 /100 WBC
PHOSPHATE SERPL-MCNC: 4.2 MG/DL (ref 2.7–4.5)
PLATELET # BLD AUTO: 185 K/UL (ref 150–450)
PMV BLD AUTO: 10.6 FL (ref 9.2–12.9)
POTASSIUM SERPL-SCNC: 5.7 MMOL/L (ref 3.5–5.1)
RBC # BLD AUTO: 3.29 M/UL (ref 4.6–6.2)
SODIUM SERPL-SCNC: 137 MMOL/L (ref 136–145)
UUN UR-MCNC: 64 MG/DL (ref 2–20)
WBC # BLD AUTO: 8.65 K/UL (ref 3.9–12.7)

## 2024-04-25 PROCEDURE — 93005 ELECTROCARDIOGRAM TRACING: CPT | Mod: ER

## 2024-04-25 PROCEDURE — 93010 ELECTROCARDIOGRAM REPORT: CPT | Mod: ,,, | Performed by: INTERNAL MEDICINE

## 2024-04-25 PROCEDURE — 85025 COMPLETE CBC W/AUTO DIFF WBC: CPT | Mod: ER | Performed by: EMERGENCY MEDICINE

## 2024-04-25 PROCEDURE — 80069 RENAL FUNCTION PANEL: CPT | Mod: ER | Performed by: EMERGENCY MEDICINE

## 2024-04-25 PROCEDURE — 99284 EMERGENCY DEPT VISIT MOD MDM: CPT | Mod: 25,ER

## 2024-04-25 NOTE — ED PROVIDER NOTES
Encounter Date: 4/25/2024       History     Chief Complaint   Patient presents with    recheck lab work     Pt states was mony at PMD yesterday and was told potassium was high. Pt rec'd meds in office and was told to f/u today to get potassium checked again. Pt AAOx3, states he feels fine.      Patient presents for potassium rechecked.  He has no acute complaints.  He was seen by Nephrology yesterday had a potassium level of 6.8 and was prescribed Lokelma.  Denies any nausea/vomiting.  Denies any abdominal pain.  States urinates every day.    The history is provided by the patient.     Review of patient's allergies indicates:   Allergen Reactions    Juluca [dolutegravir-rilpivirine] Itching    Lipitor [atorvastatin] Other (See Comments)     myalgia     Past Medical History:   Diagnosis Date    Anemia     AVM (arteriovenous malformation) 02/13/2020    CAD (coronary artery disease)     CKD (chronic kidney disease) stage 3, GFR 30-59 ml/min     Edema     Glaucoma     HIV infection     Hyperkalemia     Hyperlipidemia     Hypertension     Hypocalcemia     Proteinuria     Renal cyst, acquired, right     Rheumatoid factor positive     Ulcer of esophagus 02/13/2020    Vitamin D deficiency      Past Surgical History:   Procedure Laterality Date    BACK SURGERY      CATARACT EXTRACTION      CORONARY ANGIOPLASTY  06/19/2017    ESOPHAGOGASTRODUODENOSCOPY  02/13/2020    ESOPHAGOGASTRODUODENOSCOPY N/A 2/13/2020    Procedure: EGD (ESOPHAGOGASTRODUODENOSCOPY);  Surgeon: Garland Márquez MD;  Location: [facility] ENDO;  Service: Endoscopy;  Laterality: N/A;    PERCUTANEOUS ENDOVENOUS LASER ABLATION OF PERIPHERAL VEIN N/A 7/17/2019    Procedure: Ablation, Vein, Peripheral, Percutaneous, Endovenous, Using Laser;  Surgeon: Manuel Metzger MD;  Location: [facility] CATH LAB/EP;  Service: Cardiology;  Laterality: N/A;    SPINE SURGERY       Family History   Problem Relation Name Age of Onset    No Known Problems Sister      No Known Problems  Brother      No Known Problems Daughter      Drug abuse Son      No Known Problems Brother      No Known Problems Brother      No Known Problems Daughter       Social History     Tobacco Use    Smoking status: Never    Smokeless tobacco: Never   Substance Use Topics    Alcohol use: No    Drug use: No     Review of Systems   Gastrointestinal:  Negative for abdominal pain, nausea and vomiting.       Physical Exam     Initial Vitals [04/25/24 1216]   BP Pulse Resp Temp SpO2   134/63 60 17 98.1 °F (36.7 °C) 98 %      MAP       --         Physical Exam    Vitals reviewed.  Constitutional: He appears well-developed.   Cardiovascular:  Normal rate and regular rhythm.           No murmur heard.  Pulmonary/Chest: Breath sounds normal. He has no wheezes.   Abdominal: Abdomen is soft. There is no abdominal tenderness.   Musculoskeletal:         General: Normal range of motion.     Neurological: He is alert and oriented to person, place, and time. He has normal strength.   Skin: Skin is warm and dry.         ED Course   Procedures  Labs Reviewed   CBC W/ AUTO DIFFERENTIAL - Abnormal; Notable for the following components:       Result Value    RBC 3.29 (*)     Hemoglobin 10.1 (*)     Hematocrit 30.1 (*)     All other components within normal limits   RENAL FUNCTION PANEL - Abnormal; Notable for the following components:    Potassium 5.7 (*)     Chloride 111 (*)     CO2 17 (*)     BUN 64 (*)     Calcium 8.4 (*)     Creatinine 3.06 (*)     eGFR 19.3 (*)     All other components within normal limits     EKG Readings: (Independently Interpreted)   Sinus rhythm rate of 56, left axis, QRS of 116, QTC of 413, no ST or T-wave abnormalities       Imaging Results    None          Medications - No data to display  Medical Decision Making  Patient instructed on nephrology recommendations.  He verbalized understanding along with daughter who is at bedside.  Instructed patient to return immediately for any new or worsening symptoms and he  verbalized understanding.  He was also instructed to discontinue Tamarind fruit immediately.  He verbalized understanding.    Amount and/or Complexity of Data Reviewed  Labs: ordered. Decision-making details documented in ED Course.    Risk  Prescription drug management.  Risk Details: Differential diagnosis includes but is not limited to:  Chronic kidney disease with hyperkalemia, medication adverse effect, dietary adverse effect               ED Course as of 04/26/24 0905   Thu Apr 25, 2024   1240 CBC auto differential(!)  Chronic anemia noted [CD]   1248 Renal function panel(!)  Hyperkalemia which has improved, chronic kidney disease [CD]   1255 Numerous attempts to [facility], including number provided on call sheet, , scheduling service.  Spoke to house supervisor who will attempt to find consultant on call. [CD]   1418 Spoke to Dr Perry, who states that for the next 3 days to increase Lokelma to BID. [CD]      ED Course User Index  [CD] Edward Small DO                           Clinical Impression:  Final diagnoses:  [E87.5] Hyperkalemia (Primary)          ED Disposition Condition    Discharge Stable          ED Prescriptions       Medication Sig Dispense Start Date End Date Auth. Provider    sodium zirconium cyclosilicate (LOKELMA) 10 gram packet Take 1 packet (10 g total) by mouth 2 (two) times a day. Mix entire contents of packet(s) into drinking glass containing 3 tablespoons of water; stir well and drink immediately. Add water and repeat until no powder remains to receive entire dose. 6 packet 4/25/2024 -- Edward Small DO          Follow-up Information       Follow up With Specialties Details Why Contact Info    Myah Perry MD Nephrology Schedule an appointment as soon as possible for a visit   [address]  [address]  [facility]  [place] [address]  [phone]               Edward Small DO  04/26/24 0908

## 2024-04-26 LAB
OHS QRS DURATION: 116 MS
OHS QTC CALCULATION: 413 MS

## 2024-11-14 DIAGNOSIS — I73.9 PERIPHERAL VASCULAR DISEASE, UNSPECIFIED: Primary | ICD-10-CM

## 2024-11-14 DIAGNOSIS — M54.50 THORACOLUMBAR BACK PAIN: Primary | ICD-10-CM

## 2024-11-14 DIAGNOSIS — M54.6 THORACOLUMBAR BACK PAIN: Primary | ICD-10-CM

## 2024-11-14 DIAGNOSIS — L97.929 CHRONIC VENOUS HYPERTENSION W ULCER OF BILATERAL LOW EXTRM: ICD-10-CM

## 2024-11-14 DIAGNOSIS — I87.313 CHRONIC VENOUS HYPERTENSION W ULCER OF BILATERAL LOW EXTRM: ICD-10-CM

## 2024-11-14 DIAGNOSIS — L97.919 CHRONIC VENOUS HYPERTENSION W ULCER OF BILATERAL LOW EXTRM: ICD-10-CM

## 2024-11-19 ENCOUNTER — HOSPITAL ENCOUNTER (OUTPATIENT)
Dept: RADIOLOGY | Facility: HOSPITAL | Age: 84
Discharge: HOME | End: 2024-11-19
Attending: FAMILY MEDICINE
Payer: MEDICARE

## 2024-11-19 DIAGNOSIS — I87.313 CHRONIC VENOUS HYPERTENSION W ULCER OF BILATERAL LOW EXTRM: ICD-10-CM

## 2024-11-19 DIAGNOSIS — M54.50 THORACOLUMBAR BACK PAIN: ICD-10-CM

## 2024-11-19 DIAGNOSIS — L97.929 CHRONIC VENOUS HYPERTENSION W ULCER OF BILATERAL LOW EXTRM: ICD-10-CM

## 2024-11-19 DIAGNOSIS — L97.919 CHRONIC VENOUS HYPERTENSION W ULCER OF BILATERAL LOW EXTRM: ICD-10-CM

## 2024-11-19 DIAGNOSIS — I73.9 PERIPHERAL VASCULAR DISEASE, UNSPECIFIED: ICD-10-CM

## 2024-11-19 DIAGNOSIS — M54.6 THORACOLUMBAR BACK PAIN: ICD-10-CM

## 2024-11-19 PROCEDURE — 93970 EXTREMITY STUDY: CPT | Mod: 26,,, | Performed by: RADIOLOGY

## 2024-11-19 PROCEDURE — 72070 X-RAY EXAM THORAC SPINE 2VWS: CPT | Mod: TC,FY,PN

## 2024-11-19 PROCEDURE — 93925 LOWER EXTREMITY STUDY: CPT | Mod: 26,,, | Performed by: RADIOLOGY

## 2024-11-19 PROCEDURE — 72070 X-RAY EXAM THORAC SPINE 2VWS: CPT | Mod: 26,,, | Performed by: RADIOLOGY

## 2024-11-19 PROCEDURE — 72110 X-RAY EXAM L-2 SPINE 4/>VWS: CPT | Mod: 26,,, | Performed by: RADIOLOGY

## 2024-11-19 PROCEDURE — 93925 LOWER EXTREMITY STUDY: CPT | Mod: TC,PN

## 2024-11-19 PROCEDURE — 93970 EXTREMITY STUDY: CPT | Mod: TC,PN

## 2024-11-19 PROCEDURE — 72110 X-RAY EXAM L-2 SPINE 4/>VWS: CPT | Mod: TC,FY,PN

## 2024-11-20 ENCOUNTER — TELEPHONE (OUTPATIENT)
Dept: GASTROENTEROLOGY | Facility: CLINIC | Age: 84
End: 2024-11-20
Payer: MEDICARE

## 2024-11-20 NOTE — TELEPHONE ENCOUNTER
Daughter, Elsa, requesting gi clinic appt for diarrhea.  Appt scheduled on Wednesday, December 4, 2024 at 945am.  Instructions given to check in on 1st floor MOB prior to coming to [address].

## 2024-11-20 NOTE — TELEPHONE ENCOUNTER
----- Message from Dimas sent at 11/20/2024  2:43 PM CST -----  Contact: pt daughter  Type:  Sooner Apoointment Request    Caller is requesting a sooner appointment.  Caller declined first available appointment listed below.  Caller will not accept being placed on the waitlist and is requesting a message be sent to doctor.  Name of Caller: Alyce (Daughter)   When is the first available appointment? N./a   Symptoms:uncontrolled bowel   Would the patient rather a call back or a response via [facility]? Call   Best Call Back Number:  Additional Information:

## 2024-12-04 ENCOUNTER — OFFICE VISIT (OUTPATIENT)
Dept: GASTROENTEROLOGY | Facility: CLINIC | Age: 84
End: 2024-12-04
Payer: MEDICARE

## 2024-12-04 ENCOUNTER — LAB VISIT (OUTPATIENT)
Dept: LAB | Facility: HOSPITAL | Age: 84
End: 2024-12-04
Attending: INTERNAL MEDICINE
Payer: MEDICARE

## 2024-12-04 VITALS
HEART RATE: 63 BPM | DIASTOLIC BLOOD PRESSURE: 65 MMHG | SYSTOLIC BLOOD PRESSURE: 188 MMHG | BODY MASS INDEX: 25.46 KG/M2 | WEIGHT: 192.13 LBS | HEIGHT: 73 IN

## 2024-12-04 DIAGNOSIS — N18.4 CKD (CHRONIC KIDNEY DISEASE) STAGE 4, GFR 15-29 ML/MIN: ICD-10-CM

## 2024-12-04 DIAGNOSIS — R15.1 FECAL SMEARING: Primary | ICD-10-CM

## 2024-12-04 LAB
ALBUMIN/CREAT UR: 1017 UG/MG (ref 0–30)
BACTERIA #/AREA URNS AUTO: NORMAL /HPF
BILIRUB UR QL STRIP: NEGATIVE
CLARITY UR REFRACT.AUTO: CLEAR
COLOR UR AUTO: YELLOW
CREAT UR-MCNC: 70.4 MG/DL (ref 23–375)
CREAT UR-MCNC: 70.4 MG/DL (ref 23–375)
GLUCOSE UR QL STRIP: NEGATIVE
HGB UR QL STRIP: ABNORMAL
HYALINE CASTS UR QL AUTO: 0 /LPF
KETONES UR QL STRIP: NEGATIVE
LEUKOCYTE ESTERASE UR QL STRIP: NEGATIVE
MICROALBUMIN UR DL<=1MG/L-MCNC: 716 UG/ML
MICROSCOPIC COMMENT: NORMAL
NITRITE UR QL STRIP: NEGATIVE
PH UR STRIP: 7 [PH] (ref 5–8)
PROT UR QL STRIP: ABNORMAL
PROT UR-MCNC: 124 MG/DL (ref 0–15)
PROT/CREAT UR: 1.76 MG/G{CREAT} (ref 0–0.2)
RBC #/AREA URNS AUTO: 2 /HPF (ref 0–4)
SP GR UR STRIP: 1.01 (ref 1–1.03)
URN SPEC COLLECT METH UR: ABNORMAL
UROBILINOGEN UR STRIP-ACNC: NEGATIVE EU/DL
WBC #/AREA URNS AUTO: 5 /HPF (ref 0–5)
WBC CLUMPS UR QL AUTO: NORMAL

## 2024-12-04 PROCEDURE — 81000 URINALYSIS NONAUTO W/SCOPE: CPT | Mod: PN | Performed by: INTERNAL MEDICINE

## 2024-12-04 PROCEDURE — 99204 OFFICE O/P NEW MOD 45 MIN: CPT | Mod: S$GLB,,, | Performed by: INTERNAL MEDICINE

## 2024-12-04 PROCEDURE — 3288F FALL RISK ASSESSMENT DOCD: CPT | Mod: CPTII,S$GLB,, | Performed by: INTERNAL MEDICINE

## 2024-12-04 PROCEDURE — 1101F PT FALLS ASSESS-DOCD LE1/YR: CPT | Mod: CPTII,S$GLB,, | Performed by: INTERNAL MEDICINE

## 2024-12-04 PROCEDURE — 1126F AMNT PAIN NOTED NONE PRSNT: CPT | Mod: CPTII,S$GLB,, | Performed by: INTERNAL MEDICINE

## 2024-12-04 PROCEDURE — 84156 ASSAY OF PROTEIN URINE: CPT | Performed by: INTERNAL MEDICINE

## 2024-12-04 PROCEDURE — 99999 PR PBB SHADOW E&M-EST. PATIENT-LVL IV: CPT | Mod: PBBFAC,,, | Performed by: INTERNAL MEDICINE

## 2024-12-04 PROCEDURE — 82043 UR ALBUMIN QUANTITATIVE: CPT | Mod: PN | Performed by: INTERNAL MEDICINE

## 2024-12-04 NOTE — PATIENT INSTRUCTIONS
Miralax 17gm by mouth daily.    Audio follow up appointment scheduled on Wednesday, January 8, 2024 at 11am.

## 2024-12-04 NOTE — PROGRESS NOTES
"[facility]    CC: Fecal incontinence     HPI 84 y.o. male with a history of HIV, CKD, DVT on Eliquis who presents for evaluation incontinence. Reports this has been an issue since 2011. He has intermittent episodes of loose/ formed stool associated with urgency. Will have a bowel movement only a few times per week. He denies any blood in his stool or abdominal pain. Weight is stable and he is tolerating his diet. No nocturnal stooling present. Reports a colonoscopy ~10 years ago which was normal.     Past Medical History  Past Medical History:   Diagnosis Date    Anemia     AVM (arteriovenous malformation) 02/13/2020    CAD (coronary artery disease)     CKD (chronic kidney disease) stage 3, GFR 30-59 ml/min     Edema     Glaucoma     HIV infection     Hyperkalemia     Hyperlipidemia     Hypertension     Hypocalcemia     Proteinuria     Renal cyst, acquired, right     Rheumatoid factor positive     Ulcer of esophagus 02/13/2020    Vitamin D deficiency          Physical Examination  BP (!) 188/65 (BP Location: Right arm, Patient Position: Sitting)   Pulse 63   Ht 6' 1" (1.854 m)   Wt 87.1 kg (192 lb 2.1 oz)   BMI 25.35 kg/m²   General appearance: alert, cooperative, no distress  Lungs: clear to auscultation bilaterally, no dullness to percussion bilaterally  Heart: regular rate and rhythm without rub; no displacement of the PMI   Abdomen: soft, non-tender; bowel sounds normoactive; no organomegaly      Assessment:   84 y.o. male with a history of HIV, CKD, DVT on Eliquis who presents for evaluation of fecal incontinence. His story sounds consistent with overflow incontinence as an etiology. Will try managing conservatively. (Chronic problem with exacerbation)    Plan:  - Trial of miralax 17 g daily with goal of 1 soft bowel movement every day or two   - Discussed increasing fiber intake   - If symptoms improve with improving constipation then would continue laxatives and consider adding bisacodyl twice " per week or linzess to ensure consistent bowel movements.   - If diarrhea results from adding laxatives, will give a trial of lomotil vs imodium    Garland Márquez MD   [address] [place] [address], [address]  [place] [address] [phone]

## 2025-01-08 ENCOUNTER — OFFICE VISIT (OUTPATIENT)
Dept: GASTROENTEROLOGY | Facility: CLINIC | Age: 85
End: 2025-01-08
Payer: MEDICARE

## 2025-01-08 DIAGNOSIS — K59.00 CONSTIPATION, UNSPECIFIED CONSTIPATION TYPE: Primary | ICD-10-CM

## 2025-01-17 ENCOUNTER — LAB VISIT (OUTPATIENT)
Dept: LAB | Facility: HOSPITAL | Age: 85
End: 2025-01-17
Attending: NURSE PRACTITIONER
Payer: MEDICARE

## 2025-01-17 DIAGNOSIS — N18.4 CKD (CHRONIC KIDNEY DISEASE) STAGE 4, GFR 15-29 ML/MIN: ICD-10-CM

## 2025-01-17 LAB
BACTERIA #/AREA URNS AUTO: ABNORMAL /HPF
BILIRUB UR QL STRIP: NEGATIVE
CLARITY UR REFRACT.AUTO: CLEAR
COLOR UR AUTO: YELLOW
GLUCOSE UR QL STRIP: NEGATIVE
HGB UR QL STRIP: ABNORMAL
HYALINE CASTS UR QL AUTO: 0 /LPF
KETONES UR QL STRIP: NEGATIVE
LEUKOCYTE ESTERASE UR QL STRIP: ABNORMAL
MICROSCOPIC COMMENT: ABNORMAL
NITRITE UR QL STRIP: NEGATIVE
PH UR STRIP: 6 [PH] (ref 5–8)
PROT UR QL STRIP: ABNORMAL
RBC #/AREA URNS AUTO: 2 /HPF (ref 0–4)
SP GR UR STRIP: 1.01 (ref 1–1.03)
URN SPEC COLLECT METH UR: ABNORMAL
UROBILINOGEN UR STRIP-ACNC: NEGATIVE EU/DL
WBC #/AREA URNS AUTO: 20 /HPF (ref 0–5)
WBC CLUMPS UR QL AUTO: ABNORMAL

## 2025-01-17 PROCEDURE — 84156 ASSAY OF PROTEIN URINE: CPT | Performed by: NURSE PRACTITIONER

## 2025-01-17 PROCEDURE — 82043 UR ALBUMIN QUANTITATIVE: CPT | Mod: PN | Performed by: NURSE PRACTITIONER

## 2025-01-17 PROCEDURE — 81000 URINALYSIS NONAUTO W/SCOPE: CPT | Mod: PN | Performed by: NURSE PRACTITIONER

## 2025-01-18 LAB
ALBUMIN/CREAT UR: 412.2 UG/MG (ref 0–30)
CREAT UR-MCNC: 41 MG/DL (ref 23–375)
CREAT UR-MCNC: 41 MG/DL (ref 23–375)
MICROALBUMIN UR DL<=1MG/L-MCNC: 169 UG/ML
PROT UR-MCNC: 29 MG/DL (ref 0–15)
PROT/CREAT UR: 0.71 MG/G{CREAT} (ref 0–0.2)

## 2025-01-25 ENCOUNTER — HOSPITAL ENCOUNTER (EMERGENCY)
Facility: HOSPITAL | Age: 85
Discharge: HOME | End: 2025-01-25
Attending: FAMILY MEDICINE
Payer: MEDICARE

## 2025-01-25 VITALS
BODY MASS INDEX: 25.58 KG/M2 | HEIGHT: 73 IN | WEIGHT: 193 LBS | TEMPERATURE: 98 F | RESPIRATION RATE: 14 BRPM | HEART RATE: 71 BPM | SYSTOLIC BLOOD PRESSURE: 154 MMHG | DIASTOLIC BLOOD PRESSURE: 70 MMHG | OXYGEN SATURATION: 100 %

## 2025-01-25 DIAGNOSIS — N18.5 STAGE 5 CHRONIC KIDNEY DISEASE NOT ON CHRONIC DIALYSIS: Primary | ICD-10-CM

## 2025-01-25 DIAGNOSIS — D64.9 ANEMIA, UNSPECIFIED TYPE: ICD-10-CM

## 2025-01-25 DIAGNOSIS — E87.5 HYPERKALEMIA: ICD-10-CM

## 2025-01-25 LAB
ALBUMIN SERPL BCP-MCNC: 4.1 G/DL (ref 3.5–5.2)
ALP SERPL-CCNC: 77 U/L (ref 38–126)
ALT SERPL W/O P-5'-P-CCNC: 10 U/L (ref 10–44)
ANION GAP SERPL CALC-SCNC: 14 MMOL/L (ref 8–16)
AST SERPL-CCNC: 27 U/L (ref 15–46)
BASOPHILS # BLD AUTO: 0.03 K/UL (ref 0–0.2)
BASOPHILS NFR BLD: 0.4 % (ref 0–1.9)
BILIRUB SERPL-MCNC: 0.5 MG/DL (ref 0.1–1)
CALCIUM SERPL-MCNC: 8.5 MG/DL (ref 8.7–10.5)
CHLORIDE SERPL-SCNC: 109 MMOL/L (ref 95–110)
CO2 SERPL-SCNC: 16 MMOL/L (ref 23–29)
CREAT SERPL-MCNC: 4.97 MG/DL (ref 0.5–1.4)
DIFFERENTIAL METHOD BLD: ABNORMAL
EOSINOPHIL # BLD AUTO: 0.4 K/UL (ref 0–0.5)
EOSINOPHIL NFR BLD: 5.4 % (ref 0–8)
ERYTHROCYTE [DISTWIDTH] IN BLOOD BY AUTOMATED COUNT: 15.2 % (ref 11.5–14.5)
EST. GFR  (NO RACE VARIABLE): 10.7 ML/MIN/1.73 M^2
GLUCOSE SERPL-MCNC: 111 MG/DL (ref 70–110)
HCT VFR BLD AUTO: 26.4 % (ref 40–54)
HGB BLD-MCNC: 8.7 G/DL (ref 14–18)
IMM GRANULOCYTES # BLD AUTO: 0.03 K/UL (ref 0–0.04)
IMM GRANULOCYTES NFR BLD AUTO: 0.4 % (ref 0–0.5)
LYMPHOCYTES # BLD AUTO: 3 K/UL (ref 1–4.8)
LYMPHOCYTES NFR BLD: 38.2 % (ref 18–48)
MAGNESIUM SERPL-MCNC: 1.9 MG/DL (ref 1.6–2.6)
MCH RBC QN AUTO: 31 PG (ref 27–31)
MCHC RBC AUTO-ENTMCNC: 33 G/DL (ref 32–36)
MCV RBC AUTO: 94 FL (ref 82–98)
MONOCYTES # BLD AUTO: 0.6 K/UL (ref 0.3–1)
MONOCYTES NFR BLD: 7.2 % (ref 4–15)
NEUTROPHILS # BLD AUTO: 3.8 K/UL (ref 1.8–7.7)
NEUTROPHILS NFR BLD: 48.4 % (ref 38–73)
NRBC BLD-RTO: 0 /100 WBC
OB PNL STL: NEGATIVE
PLATELET # BLD AUTO: 159 K/UL (ref 150–450)
PMV BLD AUTO: 11.4 FL (ref 9.2–12.9)
POTASSIUM SERPL-SCNC: 5.2 MMOL/L (ref 3.5–5.1)
PROT SERPL-MCNC: 7.7 G/DL (ref 6–8.4)
RBC # BLD AUTO: 2.81 M/UL (ref 4.6–6.2)
SODIUM SERPL-SCNC: 139 MMOL/L (ref 136–145)
UUN UR-MCNC: 93 MG/DL (ref 2–20)
WBC # BLD AUTO: 7.91 K/UL (ref 3.9–12.7)

## 2025-01-25 PROCEDURE — 93005 ELECTROCARDIOGRAM TRACING: CPT | Mod: ER

## 2025-01-25 PROCEDURE — 93010 ELECTROCARDIOGRAM REPORT: CPT | Mod: ,,, | Performed by: STUDENT IN AN ORGANIZED HEALTH CARE EDUCATION/TRAINING PROGRAM

## 2025-01-25 PROCEDURE — 99900035 HC TECH TIME PER 15 MIN (STAT): Mod: ER

## 2025-01-25 PROCEDURE — 94640 AIRWAY INHALATION TREATMENT: CPT | Mod: ER

## 2025-01-25 PROCEDURE — 25000003 PHARM REV CODE 250: Mod: ER | Performed by: FAMILY MEDICINE

## 2025-01-25 PROCEDURE — 96375 TX/PRO/DX INJ NEW DRUG ADDON: CPT | Mod: ER

## 2025-01-25 PROCEDURE — 83735 ASSAY OF MAGNESIUM: CPT | Mod: ER | Performed by: FAMILY MEDICINE

## 2025-01-25 PROCEDURE — 25000242 PHARM REV CODE 250 ALT 637 W/ HCPCS: Mod: ER | Performed by: FAMILY MEDICINE

## 2025-01-25 PROCEDURE — 80053 COMPREHEN METABOLIC PANEL: CPT | Mod: ER | Performed by: FAMILY MEDICINE

## 2025-01-25 PROCEDURE — 96374 THER/PROPH/DIAG INJ IV PUSH: CPT | Mod: ER

## 2025-01-25 PROCEDURE — 85025 COMPLETE CBC W/AUTO DIFF WBC: CPT | Mod: ER | Performed by: FAMILY MEDICINE

## 2025-01-25 PROCEDURE — 99291 CRITICAL CARE FIRST HOUR: CPT | Mod: ER

## 2025-01-25 PROCEDURE — 82272 OCCULT BLD FECES 1-3 TESTS: CPT | Mod: ER | Performed by: FAMILY MEDICINE

## 2025-01-25 RX ORDER — ALBUTEROL SULFATE 2.5 MG/.5ML
10 SOLUTION RESPIRATORY (INHALATION)
Status: COMPLETED | OUTPATIENT
Start: 2025-01-25 | End: 2025-01-25

## 2025-01-25 RX ORDER — SODIUM BICARBONATE 1 MEQ/ML
50 SYRINGE (ML) INTRAVENOUS
Status: COMPLETED | OUTPATIENT
Start: 2025-01-25 | End: 2025-01-25

## 2025-01-25 RX ORDER — CALCIUM GLUCONATE 20 MG/ML
1 INJECTION, SOLUTION INTRAVENOUS
Status: COMPLETED | OUTPATIENT
Start: 2025-01-25 | End: 2025-01-25

## 2025-01-25 RX ADMIN — CALCIUM GLUCONATE 1 G: 20 INJECTION, SOLUTION INTRAVENOUS at 01:01

## 2025-01-25 RX ADMIN — ALBUTEROL SULFATE 10 MG: 2.5 SOLUTION RESPIRATORY (INHALATION) at 01:01

## 2025-01-25 RX ADMIN — SODIUM BICARBONATE 50 MEQ: 84 INJECTION INTRAVENOUS at 01:01

## 2025-01-25 RX ADMIN — SODIUM ZIRCONIUM CYCLOSILICATE 10 G: 10 POWDER, FOR SUSPENSION ORAL at 01:01

## 2025-01-25 NOTE — ED NOTES
Patient presents to ED with daughter stating they received a phone call in regards to an abnormal lab value that was drawn outpatient this morning; potassium level high- history of CKD and hyperkalemia taking Lokelma MWF.  Patient claims he took double dose yesterday.  His repeat potassium came back as 5.8 and he was advised to follow up emergency room; he denies any other signs and symptoms.

## 2025-01-25 NOTE — ED PROVIDER NOTES
Encounter Date: 1/25/2025       History     Chief Complaint   Patient presents with    Elevated Potassium     Pt had blood work this morning and was told to come to the ER bc his potassium is elevated. Pt denies any symptoms     84-year-old male with history of CKD and hyperkalemia taking Lokelma Monday Wednesday and Friday.  Patient claims he took double dose yesterday.  His repeat potassium came back as 5.8 and he was advised to follow up emergency room.  He has no other symptoms.    The history is provided by the patient.     Review of patient's allergies indicates:   Allergen Reactions    Juluca [dolutegravir-rilpivirine] Itching    Lipitor [atorvastatin] Other (See Comments)     myalgia     Past Medical History:   Diagnosis Date    Anemia     AVM (arteriovenous malformation) 02/13/2020    CAD (coronary artery disease)     CKD (chronic kidney disease) stage 3, GFR 30-59 ml/min     Edema     Glaucoma     HIV infection     Hyperkalemia     Hyperlipidemia     Hypertension     Hypocalcemia     Proteinuria     Renal cyst, acquired, right     Rheumatoid factor positive     Ulcer of esophagus 02/13/2020    Vitamin D deficiency      Past Surgical History:   Procedure Laterality Date    BACK SURGERY      CATARACT EXTRACTION      CORONARY ANGIOPLASTY  06/19/2017    ESOPHAGOGASTRODUODENOSCOPY  02/13/2020    ESOPHAGOGASTRODUODENOSCOPY N/A 2/13/2020    Procedure: EGD (ESOPHAGOGASTRODUODENOSCOPY);  Surgeon: Garland Márquez MD;  Location: [facility] ENDO;  Service: Endoscopy;  Laterality: N/A;    PERCUTANEOUS ENDOVENOUS LASER ABLATION OF PERIPHERAL VEIN N/A 7/17/2019    Procedure: Ablation, Vein, Peripheral, Percutaneous, Endovenous, Using Laser;  Surgeon: Manuel Metzger MD;  Location: [facility] CATH LAB/EP;  Service: Cardiology;  Laterality: N/A;    SPINE SURGERY       Family History   Problem Relation Name Age of Onset    No Known Problems Sister      No Known Problems Brother      No Known Problems Daughter      Drug abuse Son       No Known Problems Brother      No Known Problems Brother      No Known Problems Daughter       Social History     Tobacco Use    Smoking status: Never    Smokeless tobacco: Never   Substance Use Topics    Alcohol use: No    Drug use: No     Review of Systems   Constitutional:  Negative for activity change, appetite change, chills and fever.   HENT:  Negative for congestion, ear discharge, rhinorrhea, sinus pressure, sinus pain, sore throat and trouble swallowing.    Eyes:  Negative for photophobia, pain, discharge, redness, itching and visual disturbance.   Respiratory:  Negative for cough, chest tightness, shortness of breath and wheezing.    Cardiovascular:  Negative for chest pain, palpitations and leg swelling.   Gastrointestinal:  Negative for abdominal distention, abdominal pain, constipation, diarrhea, nausea and vomiting.   Genitourinary:  Negative for dysuria, flank pain, frequency and hematuria.   Musculoskeletal:  Negative for back pain, gait problem, neck pain and neck stiffness.   Skin:  Negative for rash and wound.   Neurological:  Negative for dizziness, tremors, seizures, syncope, speech difficulty, weakness, light-headedness, numbness and headaches.   Psychiatric/Behavioral:  Negative for behavioral problems, confusion, hallucinations and sleep disturbance. The patient is not nervous/anxious.    All other systems reviewed and are negative.      Physical Exam     Initial Vitals [01/25/25 1215]   BP Pulse Resp Temp SpO2   (!) 185/79 74 19 98.2 °F (36.8 °C) 99 %      MAP       --         Physical Exam    Nursing note and vitals reviewed.  Constitutional: Vital signs are normal. He appears well-developed and well-nourished. He is active. No distress.   HENT:   Head: Normocephalic.   Nose: Nose normal. Mouth/Throat: Oropharynx is clear and moist and mucous membranes are normal.   Eyes: Conjunctivae, EOM and lids are normal.   Neck: Neck supple.   Normal range of motion.  Cardiovascular:  Normal rate,  regular rhythm, S1 normal, S2 normal and normal heart sounds.           Pulmonary/Chest: Breath sounds normal. No respiratory distress. He has no wheezes. He has no rhonchi. He has no rales.   Abdominal: Abdomen is soft. Bowel sounds are normal. He exhibits no distension. There is no abdominal tenderness.   Rectal exam no stool in rectum.  No blood in rectum.  Normal tone. There is no rebound.   Musculoskeletal:         General: Normal range of motion.      Right upper arm: Normal.      Left upper arm: Normal.      Cervical back: Normal range of motion and neck supple.      Right lower leg: Normal.      Left lower leg: Normal.     Neurological: He is alert and oriented to person, place, and time. He has normal strength. He displays normal reflexes. No cranial nerve deficit. GCS score is 15. GCS eye subscore is 4. GCS verbal subscore is 5. GCS motor subscore is 6.   Skin: Skin is warm. Capillary refill takes less than 2 seconds.   Psychiatric: He has a normal mood and affect. His speech is normal and behavior is normal. Thought content normal. Cognition and memory are normal.         ED Course   Critical Care    Date/Time: 1/25/2025 2:40 PM    Performed by: Jose Cates MD  Authorized by: Jose Cates MD  Direct patient critical care time: 5 minutes  Additional history critical care time: 5 minutes  Ordering / reviewing critical care time: 5 minutes  Documentation critical care time: 5 minutes  Consulting other physicians critical care time: 5 minutes  Consult with family critical care time: 5 minutes  Other critical care time: 5 minutes  Total critical care time (exclusive of procedural time) : 35 minutes  Critical care was necessary to treat or prevent imminent or life-threatening deterioration of the following conditions: metabolic crisis.  Critical care was time spent personally by me on the following activities: re-evaluation of patient's condition, ordering and review of laboratory studies,  obtaining history from patient or surrogate, examination of patient, evaluation of patient's response to treatment and development of treatment plan with patient or surrogate.        Labs Reviewed   COMPREHENSIVE METABOLIC PANEL - Abnormal       Result Value    Sodium 139      Potassium 5.2 (*)     Chloride 109      CO2 16 (*)     Glucose 111 (*)     BUN 93 (*)     Creatinine 4.97 (*)     Calcium 8.5 (*)     Total Protein 7.7      Albumin 4.1      Total Bilirubin 0.5      Alkaline Phosphatase 77      AST 27      ALT 10      Anion Gap 14      eGFR 10.7 (*)    CBC W/ AUTO DIFFERENTIAL - Abnormal    WBC 7.91      RBC 2.81 (*)     Hemoglobin 8.7 (*)     Hematocrit 26.4 (*)     MCV 94      MCH 31.0      MCHC 33.0      RDW 15.2 (*)     Platelets 159      MPV 11.4      Immature Granulocytes 0.4      Gran # (ANC) 3.8      Immature Grans (Abs) 0.03      Lymph # 3.0      Mono # 0.6      Eos # 0.4      Baso # 0.03      nRBC 0      Gran % 48.4      Lymph % 38.2      Mono % 7.2      Eosinophil % 5.4      Basophil % 0.4      Differential Method Automated     MAGNESIUM    Magnesium 1.9     OCCULT BLOOD X 1, STOOL    Occult Blood Negative       EKG Readings: (Independently Interpreted)   Initial Reading: No STEMI. Rhythm: Atrial Fibrillation. Heart Rate: 68. Ectopy: No Ectopy. Q Waves: V2 and V3. Clinical Impression: Atrial Fibrillation       Imaging Results    None          Medications   albuterol sulfate nebulizer solution 10 mg (10 mg Nebulization Given 1/25/25 1307)   calcium gluconate 1 g in NS IVPB (premixed) (0 g Intravenous Stopped 1/25/25 1318)   sodium bicarbonate 8.4 % (1 mEq/mL) injection 50 mEq (50 mEq Intravenous Given 1/25/25 1319)   sodium zirconium cyclosilicate packet 10 g (10 g Oral Given 1/25/25 1323)     Medical Decision Making  CKD stage 5 with hyperkalemia sent to ED for further evaluation.  Patient has been taking his Lokelma in spite had hyperkalemia.    Differential diagnosis include not limited to CKD 5  not on dialysis, hyperkalemia, medication induced, lab error, hemolysis.  Will repeat labs.  EKG.  Atrial fibrillation, ventricular rate 68, septal infarct.  Patient is on Eliquis.  Repeat potassium is 5.2.  Patient is excessively treated with albuterol, calcium gluconate, sodium bicarbonate and Lokelma in ED.  He is advised to continue Lokelma everyday.  Call Nephrology on Monday.  Other labs reviewed a as slightly low hemoglobin 8.7 from his baseline.  He denies any acute bleeding.  Denies rectal bleeding or black stool.  Vital stable with no hypotension.  Rectal exam normal.  Advised to notify his PCP/ED for any blood in his stool.  Or black stool.      Amount and/or Complexity of Data Reviewed  Labs: ordered. Decision-making details documented in ED Course.  ECG/medicine tests: ordered and independent interpretation performed.    Risk  Prescription drug management.                                      Clinical Impression:  Final diagnoses:  [E87.5] Hyperkalemia  [N18.5] Stage 5 chronic kidney disease not on chronic dialysis (Primary)  [D64.9] Anemia, unspecified type          ED Disposition Condition    Discharge Stable          ED Prescriptions       Medication Sig Dispense Start Date End Date Auth. Provider    sodium zirconium cyclosilicate (LOKELMA) 10 gram packet Take 1 packet (10 g total) by mouth once daily. Mix entire contents of packet(s) into drinking glass containing 3 tablespoons of water; stir well and drink immediately. Add water and repeat until no powder remains to receive entire dose. 30 packet 1/25/2025 -- Jose Cates MD          Follow-up Information    None          Jose Cates MD  01/25/25 1440       Jose Cates MD  01/25/25 1442

## 2025-01-27 LAB
OHS QRS DURATION: 114 MS
OHS QTC CALCULATION: 452 MS

## 2025-02-04 ENCOUNTER — LAB VISIT (OUTPATIENT)
Dept: LAB | Facility: HOSPITAL | Age: 85
End: 2025-02-04
Attending: NURSE PRACTITIONER
Payer: MEDICARE

## 2025-02-04 DIAGNOSIS — N18.4 ANEMIA IN STAGE 4 CHRONIC KIDNEY DISEASE: ICD-10-CM

## 2025-02-04 DIAGNOSIS — E87.5 HYPERKALEMIA: ICD-10-CM

## 2025-02-04 DIAGNOSIS — N18.4 CKD (CHRONIC KIDNEY DISEASE) STAGE 4, GFR 15-29 ML/MIN: ICD-10-CM

## 2025-02-04 DIAGNOSIS — D63.1 ANEMIA IN STAGE 4 CHRONIC KIDNEY DISEASE: ICD-10-CM

## 2025-02-04 LAB
ALBUMIN SERPL BCP-MCNC: 4 G/DL (ref 3.5–5.2)
ANION GAP SERPL CALC-SCNC: 11 MMOL/L (ref 8–16)
BASOPHILS # BLD AUTO: 0.03 K/UL (ref 0–0.2)
BASOPHILS NFR BLD: 0.4 % (ref 0–1.9)
CALCIUM SERPL-MCNC: 8.2 MG/DL (ref 8.7–10.5)
CHLORIDE SERPL-SCNC: 100 MMOL/L (ref 95–110)
CO2 SERPL-SCNC: 22 MMOL/L (ref 23–29)
CREAT SERPL-MCNC: 4.76 MG/DL (ref 0.5–1.4)
DIFFERENTIAL METHOD BLD: ABNORMAL
EOSINOPHIL # BLD AUTO: 0.4 K/UL (ref 0–0.5)
EOSINOPHIL NFR BLD: 4.9 % (ref 0–8)
ERYTHROCYTE [DISTWIDTH] IN BLOOD BY AUTOMATED COUNT: 15.1 % (ref 11.5–14.5)
EST. GFR  (NO RACE VARIABLE): 11.3 ML/MIN/1.73 M^2
GLUCOSE SERPL-MCNC: 120 MG/DL (ref 70–110)
HCT VFR BLD AUTO: 25.3 % (ref 40–54)
HGB BLD-MCNC: 8.3 G/DL (ref 14–18)
IMM GRANULOCYTES # BLD AUTO: 0.03 K/UL (ref 0–0.04)
IMM GRANULOCYTES NFR BLD AUTO: 0.4 % (ref 0–0.5)
LYMPHOCYTES # BLD AUTO: 3 K/UL (ref 1–4.8)
LYMPHOCYTES NFR BLD: 39.3 % (ref 18–48)
MCH RBC QN AUTO: 30.6 PG (ref 27–31)
MCHC RBC AUTO-ENTMCNC: 32.8 G/DL (ref 32–36)
MCV RBC AUTO: 93 FL (ref 82–98)
MONOCYTES # BLD AUTO: 0.6 K/UL (ref 0.3–1)
MONOCYTES NFR BLD: 8.3 % (ref 4–15)
NEUTROPHILS # BLD AUTO: 3.5 K/UL (ref 1.8–7.7)
NEUTROPHILS NFR BLD: 46.7 % (ref 38–73)
NRBC BLD-RTO: 0 /100 WBC
PHOSPHATE SERPL-MCNC: 5.7 MG/DL (ref 2.7–4.5)
PLATELET # BLD AUTO: 169 K/UL (ref 150–450)
PMV BLD AUTO: 11.6 FL (ref 9.2–12.9)
POTASSIUM SERPL-SCNC: 4.5 MMOL/L (ref 3.5–5.1)
RBC # BLD AUTO: 2.71 M/UL (ref 4.6–6.2)
SODIUM SERPL-SCNC: 133 MMOL/L (ref 136–145)
UUN UR-MCNC: 99 MG/DL (ref 2–20)
WBC # BLD AUTO: 7.51 K/UL (ref 3.9–12.7)

## 2025-02-04 PROCEDURE — 85025 COMPLETE CBC W/AUTO DIFF WBC: CPT | Mod: PN | Performed by: NURSE PRACTITIONER

## 2025-02-04 PROCEDURE — 36415 COLL VENOUS BLD VENIPUNCTURE: CPT | Mod: PN | Performed by: NURSE PRACTITIONER

## 2025-02-04 PROCEDURE — 80069 RENAL FUNCTION PANEL: CPT | Mod: PN | Performed by: NURSE PRACTITIONER

## 2025-02-11 ENCOUNTER — TELEPHONE (OUTPATIENT)
Dept: SURGERY | Facility: CLINIC | Age: 85
End: 2025-02-11
Payer: MEDICARE

## 2025-02-11 NOTE — TELEPHONE ENCOUNTER
02/11/2025        1645  Attempted to contact patient to schedule an appointment for PD cath evaluation. No answer. Left voicemail.

## 2025-02-14 ENCOUNTER — LAB VISIT (OUTPATIENT)
Dept: LAB | Facility: HOSPITAL | Age: 85
End: 2025-02-14
Attending: NURSE PRACTITIONER
Payer: MEDICARE

## 2025-02-14 DIAGNOSIS — N18.4 ANEMIA IN STAGE 4 CHRONIC KIDNEY DISEASE: ICD-10-CM

## 2025-02-14 DIAGNOSIS — E87.20 METABOLIC ACIDOSIS: ICD-10-CM

## 2025-02-14 DIAGNOSIS — N18.4 CKD (CHRONIC KIDNEY DISEASE) STAGE 4, GFR 15-29 ML/MIN: ICD-10-CM

## 2025-02-14 DIAGNOSIS — D63.1 ANEMIA IN STAGE 4 CHRONIC KIDNEY DISEASE: ICD-10-CM

## 2025-02-14 LAB
ALBUMIN SERPL BCP-MCNC: 4.1 G/DL (ref 3.5–5.2)
ANION GAP SERPL CALC-SCNC: 15 MMOL/L (ref 8–16)
BASOPHILS # BLD AUTO: 0.03 K/UL (ref 0–0.2)
BASOPHILS NFR BLD: 0.3 % (ref 0–1.9)
CALCIUM SERPL-MCNC: 7.3 MG/DL (ref 8.7–10.5)
CHLORIDE SERPL-SCNC: 94 MMOL/L (ref 95–110)
CO2 SERPL-SCNC: 27 MMOL/L (ref 23–29)
CREAT SERPL-MCNC: 4.97 MG/DL (ref 0.5–1.4)
DIFFERENTIAL METHOD BLD: ABNORMAL
EOSINOPHIL # BLD AUTO: 0.4 K/UL (ref 0–0.5)
EOSINOPHIL NFR BLD: 4.1 % (ref 0–8)
ERYTHROCYTE [DISTWIDTH] IN BLOOD BY AUTOMATED COUNT: 15.2 % (ref 11.5–14.5)
EST. GFR  (NO RACE VARIABLE): 10.7 ML/MIN/1.73 M^2
FERRITIN SERPL-MCNC: 325 NG/ML (ref 20–300)
GLUCOSE SERPL-MCNC: 115 MG/DL (ref 70–110)
HCT VFR BLD AUTO: 26.1 % (ref 40–54)
HGB BLD-MCNC: 8.5 G/DL (ref 14–18)
IMM GRANULOCYTES # BLD AUTO: 0.05 K/UL (ref 0–0.04)
IMM GRANULOCYTES NFR BLD AUTO: 0.6 % (ref 0–0.5)
LYMPHOCYTES # BLD AUTO: 3.6 K/UL (ref 1–4.8)
LYMPHOCYTES NFR BLD: 40.5 % (ref 18–48)
MCH RBC QN AUTO: 30.6 PG (ref 27–31)
MCHC RBC AUTO-ENTMCNC: 32.6 G/DL (ref 32–36)
MCV RBC AUTO: 94 FL (ref 82–98)
MONOCYTES # BLD AUTO: 0.8 K/UL (ref 0.3–1)
MONOCYTES NFR BLD: 8.8 % (ref 4–15)
NEUTROPHILS # BLD AUTO: 4 K/UL (ref 1.8–7.7)
NEUTROPHILS NFR BLD: 45.7 % (ref 38–73)
NRBC BLD-RTO: 0 /100 WBC
PHOSPHATE SERPL-MCNC: 6.2 MG/DL (ref 2.7–4.5)
PLATELET # BLD AUTO: 156 K/UL (ref 150–450)
PMV BLD AUTO: 10.8 FL (ref 9.2–12.9)
POTASSIUM SERPL-SCNC: 3.6 MMOL/L (ref 3.5–5.1)
RBC # BLD AUTO: 2.78 M/UL (ref 4.6–6.2)
SODIUM SERPL-SCNC: 136 MMOL/L (ref 136–145)
UUN UR-MCNC: 119 MG/DL (ref 2–20)
WBC # BLD AUTO: 8.8 K/UL (ref 3.9–12.7)

## 2025-02-14 PROCEDURE — 36415 COLL VENOUS BLD VENIPUNCTURE: CPT | Mod: PN | Performed by: NURSE PRACTITIONER

## 2025-02-14 PROCEDURE — 83540 ASSAY OF IRON: CPT | Mod: PN | Performed by: NURSE PRACTITIONER

## 2025-02-14 PROCEDURE — 80069 RENAL FUNCTION PANEL: CPT | Mod: PN | Performed by: NURSE PRACTITIONER

## 2025-02-14 PROCEDURE — 85025 COMPLETE CBC W/AUTO DIFF WBC: CPT | Mod: PN | Performed by: NURSE PRACTITIONER

## 2025-02-14 PROCEDURE — 82728 ASSAY OF FERRITIN: CPT | Performed by: NURSE PRACTITIONER

## 2025-02-15 LAB
IRON SERPL-MCNC: 56 UG/DL (ref 45–160)
SATURATED IRON: 19 % (ref 20–50)
TOTAL IRON BINDING CAPACITY: 295 UG/DL (ref 250–450)
TRANSFERRIN SERPL-MCNC: 199 MG/DL (ref 200–375)

## 2025-02-20 ENCOUNTER — TELEPHONE (OUTPATIENT)
Dept: SURGERY | Facility: CLINIC | Age: 85
End: 2025-02-20
Payer: MEDICARE

## 2025-02-20 NOTE — TELEPHONE ENCOUNTER
Spoke to patient's daughter regarding patient's appointment on tomorrow. Rescheduled from 1400 to 0840 on 02/21/2025. Patient's daughter verbalized understanding.

## 2025-02-21 ENCOUNTER — OFFICE VISIT (OUTPATIENT)
Dept: SURGERY | Facility: CLINIC | Age: 85
End: 2025-02-21
Payer: MEDICARE

## 2025-02-21 VITALS
BODY MASS INDEX: 25.1 KG/M2 | DIASTOLIC BLOOD PRESSURE: 58 MMHG | HEART RATE: 56 BPM | SYSTOLIC BLOOD PRESSURE: 130 MMHG | HEIGHT: 73 IN | WEIGHT: 189.38 LBS | TEMPERATURE: 98 F

## 2025-02-21 DIAGNOSIS — N18.4 CKD (CHRONIC KIDNEY DISEASE) STAGE 4, GFR 15-29 ML/MIN: ICD-10-CM

## 2025-02-21 RX ORDER — SILVER SULFADIAZINE 10 G/1000G
CREAM TOPICAL
COMMUNITY
Start: 2024-11-19

## 2025-02-21 RX ORDER — OLMESARTAN MEDOXOMIL 40 MG/1
40 TABLET ORAL
COMMUNITY
Start: 2025-01-29

## 2025-02-21 NOTE — PROGRESS NOTES
Patient ID: Osorio Boggs is a 84 y.o. male.    Chief Complaint: No chief complaint on file.      HPI:  HPI  84M with known CKD, progressing. Follows with dr barbosa. Referred here for PD catheter. Never had HD or PD.   No hx of ab surgery  Overall feeling well. Makes urine.     Review of Systems   Constitutional:  Negative for chills, diaphoresis and fever.   HENT:  Negative for trouble swallowing.    Respiratory:  Negative for cough, shortness of breath, wheezing and stridor.    Cardiovascular:  Negative for chest pain and palpitations.   Gastrointestinal:  Negative for abdominal distention, abdominal pain, blood in stool, diarrhea, nausea and vomiting.   Endocrine: Negative for cold intolerance and heat intolerance.   Genitourinary:  Negative for difficulty urinating.   Musculoskeletal:  Negative for back pain.   Skin:  Negative for rash.   Allergic/Immunologic: Negative for immunocompromised state.   Neurological:  Negative for dizziness, syncope and numbness.   Hematological:  Negative for adenopathy.   Psychiatric/Behavioral:  Negative for agitation.        Current Medications[1]    Review of patient's allergies indicates:   Allergen Reactions    Juluca [dolutegravir-rilpivirine] Itching    Lipitor [atorvastatin] Other (See Comments)     myalgia       Past Medical History:   Diagnosis Date    Anemia     AVM (arteriovenous malformation) 02/13/2020    CAD (coronary artery disease)     CKD (chronic kidney disease) stage 3, GFR 30-59 ml/min     Edema     Glaucoma     HIV infection     Hyperkalemia     Hyperlipidemia     Hypertension     Hypocalcemia     Proteinuria     Renal cyst, acquired, right     Rheumatoid factor positive     Ulcer of esophagus 02/13/2020    Vitamin D deficiency        Past Surgical History:   Procedure Laterality Date    BACK SURGERY      CATARACT EXTRACTION      CORONARY ANGIOPLASTY  06/19/2017    ESOPHAGOGASTRODUODENOSCOPY  02/13/2020    ESOPHAGOGASTRODUODENOSCOPY N/A 2/13/2020     Procedure: EGD (ESOPHAGOGASTRODUODENOSCOPY);  Surgeon: Garland Márquez MD;  Location: [facility] ENDO;  Service: Endoscopy;  Laterality: N/A;    PERCUTANEOUS ENDOVENOUS LASER ABLATION OF PERIPHERAL VEIN N/A 7/17/2019    Procedure: Ablation, Vein, Peripheral, Percutaneous, Endovenous, Using Laser;  Surgeon: Manuel Metzger MD;  Location: [facility] CATH LAB/EP;  Service: Cardiology;  Laterality: N/A;    SPINE SURGERY         Social History[2]    Vitals:    02/21/25 0852   BP: (!) 130/58   Pulse: (!) 56   Temp: 98 °F (36.7 °C)       Physical Exam  Constitutional:       General: He is not in acute distress.  HENT:      Head: Normocephalic and atraumatic.   Eyes:      General: No scleral icterus.  Cardiovascular:      Rate and Rhythm: Normal rate.   Pulmonary:      Effort: Pulmonary effort is normal. No respiratory distress.      Breath sounds: No stridor.   Abdominal:      Palpations: Abdomen is soft.      Tenderness: There is no abdominal tenderness.   Lymphadenopathy:      Cervical: No cervical adenopathy.   Skin:     General: Skin is warm.      Findings: No erythema.   Neurological:      Mental Status: He is alert and oriented to person, place, and time.   Psychiatric:         Behavior: Behavior normal.     Body mass index is 24.99 kg/m².  Last GFR 11 a few days ago  Potassium normal, was elevated two weeks ago.    Assessment & Plan:  84M with CKD  Referred here for PD by dr barbosa. Likely to need dialysis access in the near future  Discussed with patient, placement.   They want to think about it and will call us back  Will discuss with dr barbosa  Plan PD catheter placement. Can schedule over the phone when they're ready  On eliquis for CAD/PAD, heart cath with stent in 2018. Need to stop eliquis 2 days prior to surgery           [1]   Current Outpatient Medications   Medication Sig Dispense Refill    allopurinoL (ZYLOPRIM) 100 MG tablet Take 2 tablets (200 mg total) by mouth once daily. 180 tablet 3    apixaban  (ELIQUIS) 2.5 mg Tab Take 1 tablet (2.5 mg total) by mouth 2 (two) times daily. 180 tablet 3    brimonidine 0.1% (ALPHAGAN P) 0.1 % Drop Place 1 drop into both eyes 2 (two) times daily.       carvediloL (COREG) 3.125 MG tablet Take 1 tablet (3.125 mg total) by mouth 2 (two) times daily with meals. 180 tablet 3    epoetin nafisa (PROCRIT) 20,000 unit/mL injection Inject 1 mL (20,000 Units total) into the skin every 7 days. 4 mL 11    ergocalciferol (ERGOCALCIFEROL) 50,000 unit Cap Take 1 capsule (50,000 Units total) by mouth every 7 days. 12 capsule 3    ferrous sulfate 324 mg (65 mg iron) TbEC Take 1 tablet (324 mg total) by mouth once daily. 90 tablet 3    furosemide (LASIX) 80 MG tablet Take 1 tablet (80 mg total) by mouth 2 (two) times daily. As needed 180 tablet 3    latanoprost 0.005 % ophthalmic solution 1 drop every evening.      magnesium oxide (MAG-OX) 400 mg (241.3 mg magnesium) tablet Take 2 tablets (800 mg total) by mouth 2 (two) times daily. 320 tablet 3    metOLazone (ZAROXOLYN) 10 MG tablet Take 1 tablet (10 mg total) by mouth daily as needed (swelling, rescue pill, only if Lasix is not working on its own). 30 tablet 11    NIFEdipine (PROCARDIA-XL) 30 MG (OSM) 24 hr tablet Take 1 tablet (30 mg total) by mouth once daily. 90 tablet 3    olmesartan (BENICAR) 40 MG tablet Take 40 mg by mouth.      PREZCOBIX 800-150 mg-mg Tab tablet once daily.      psyllium husk 6 gram/6 gram Powd Take 1 capsule by mouth 2 (two) times a day. To prevent constipation 450 g 11    raltegravir (ISENTRESS HD) 600 mg tablet Take 600 mg by mouth once daily.      rosuvastatin (CRESTOR) 20 MG tablet Take 20 mg by mouth once daily.      sodium zirconium cyclosilicate (LOKELMA) 10 gram packet Take 1 packet (10 g total) by mouth every Mon, Wed, Fri. Mix entire contents of packet(s) into drinking glass containing 3 tablespoons of water; stir well and drink immediately. Add water and repeat until no powder remains to receive entire dose.  33 packet 3    SSD 1 % cream APPLY 1 APPLICATION TOPICALLY TO AFFECTED AREA DAILY       No current facility-administered medications for this visit.   [2]   Social History  Socioeconomic History    Marital status:    Tobacco Use    Smoking status: Never    Smokeless tobacco: Never   Substance and Sexual Activity    Alcohol use: No    Drug use: No    Sexual activity: Not Currently

## 2025-03-14 ENCOUNTER — LAB VISIT (OUTPATIENT)
Dept: LAB | Facility: HOSPITAL | Age: 85
End: 2025-03-14
Attending: INTERNAL MEDICINE
Payer: MEDICARE

## 2025-03-14 DIAGNOSIS — N18.4 CKD (CHRONIC KIDNEY DISEASE) STAGE 4, GFR 15-29 ML/MIN: ICD-10-CM

## 2025-03-14 LAB
BACTERIA #/AREA URNS AUTO: ABNORMAL /HPF
BILIRUB UR QL STRIP: NEGATIVE
CLARITY UR REFRACT.AUTO: CLEAR
COLOR UR AUTO: YELLOW
CREAT UR-MCNC: 42.6 MG/DL (ref 23–375)
GLUCOSE UR QL STRIP: NEGATIVE
HGB UR QL STRIP: ABNORMAL
KETONES UR QL STRIP: NEGATIVE
LEUKOCYTE ESTERASE UR QL STRIP: ABNORMAL
MICROSCOPIC COMMENT: ABNORMAL
NITRITE UR QL STRIP: POSITIVE
PH UR STRIP: 6 [PH] (ref 5–8)
PROT UR QL STRIP: ABNORMAL
PROT UR-MCNC: 20 MG/DL (ref 0–15)
PROT/CREAT UR: 0.47 MG/G{CREAT} (ref 0–0.2)
RBC #/AREA URNS AUTO: 1 /HPF (ref 0–4)
SP GR UR STRIP: 1.01 (ref 1–1.03)
URN SPEC COLLECT METH UR: ABNORMAL
UROBILINOGEN UR STRIP-ACNC: NEGATIVE EU/DL
WBC #/AREA URNS AUTO: 10 /HPF (ref 0–5)
WBC CLUMPS UR QL AUTO: ABNORMAL

## 2025-03-14 PROCEDURE — 81000 URINALYSIS NONAUTO W/SCOPE: CPT | Mod: PN | Performed by: INTERNAL MEDICINE

## 2025-03-14 PROCEDURE — 84156 ASSAY OF PROTEIN URINE: CPT | Performed by: INTERNAL MEDICINE

## 2025-04-11 ENCOUNTER — LAB VISIT (OUTPATIENT)
Dept: LAB | Facility: HOSPITAL | Age: 85
End: 2025-04-11
Attending: INTERNAL MEDICINE
Payer: MEDICARE

## 2025-04-11 DIAGNOSIS — N18.4 CKD (CHRONIC KIDNEY DISEASE) STAGE 4, GFR 15-29 ML/MIN: ICD-10-CM

## 2025-04-11 DIAGNOSIS — N25.81 SECONDARY HYPERPARATHYROIDISM OF RENAL ORIGIN: ICD-10-CM

## 2025-04-11 LAB
ABSOLUTE EOSINOPHIL (OHS): 0.42 K/UL
ABSOLUTE MONOCYTE (OHS): 0.66 K/UL (ref 0.3–1)
ABSOLUTE NEUTROPHIL COUNT (OHS): 3.48 K/UL (ref 1.8–7.7)
ALBUMIN SERPL BCP-MCNC: 4.5 G/DL (ref 3.5–5.2)
ALBUMIN/CREAT UR: 282.6 UG/MG
ANION GAP (OHS): 17 MMOL/L (ref 8–16)
BACTERIA #/AREA URNS HPF: ABNORMAL /HPF
BASOPHILS # BLD AUTO: 0.02 K/UL
BASOPHILS NFR BLD AUTO: 0.2 %
BILIRUB UR QL STRIP.AUTO: NEGATIVE
BUN SERPL-MCNC: 135 MG/DL (ref 2–20)
CALCIUM SERPL-MCNC: 8.4 MG/DL (ref 8.7–10.5)
CHLORIDE SERPL-SCNC: 91 MMOL/L (ref 95–110)
CLARITY UR: CLEAR
CO2 SERPL-SCNC: 26 MMOL/L (ref 23–29)
COLOR UR AUTO: YELLOW
CREAT SERPL-MCNC: 5.4 MG/DL (ref 0.5–1.4)
CREAT UR-MCNC: 45.4 MG/DL (ref 23–375)
CREAT UR-MCNC: 46 MG/DL (ref 23–375)
ERYTHROCYTE [DISTWIDTH] IN BLOOD BY AUTOMATED COUNT: 14.4 % (ref 11.5–14.5)
GFR SERPLBLD CREATININE-BSD FMLA CKD-EPI: 10 ML/MIN/1.73/M2
GLUCOSE SERPL-MCNC: 145 MG/DL (ref 70–110)
GLUCOSE UR QL STRIP: NEGATIVE
HCT VFR BLD AUTO: 31.1 % (ref 40–54)
HGB BLD-MCNC: 10.3 GM/DL (ref 14–18)
HGB UR QL STRIP: ABNORMAL
IMM GRANULOCYTES # BLD AUTO: 0.03 K/UL (ref 0–0.04)
IMM GRANULOCYTES NFR BLD AUTO: 0.3 % (ref 0–0.5)
KETONES UR QL STRIP: NEGATIVE
LEUKOCYTE ESTERASE UR QL STRIP: ABNORMAL
LYMPHOCYTES # BLD AUTO: 4.1 K/UL (ref 1–4.8)
MAGNESIUM SERPL-MCNC: 2.1 MG/DL (ref 1.6–2.6)
MCH RBC QN AUTO: 31.3 PG (ref 27–31)
MCHC RBC AUTO-ENTMCNC: 33.1 G/DL (ref 32–36)
MCV RBC AUTO: 95 FL (ref 82–98)
MICROALBUMIN UR-MCNC: 130 UG/ML (ref ?–5000)
MICROSCOPIC COMMENT: ABNORMAL
NITRITE UR QL STRIP: NEGATIVE
NUCLEATED RBC (/100WBC) (OHS): 0 /100 WBC
PH UR STRIP: 6 [PH]
PHOSPHATE SERPL-MCNC: 6.1 MG/DL (ref 2.7–4.5)
PLATELET # BLD AUTO: 165 K/UL (ref 150–450)
PMV BLD AUTO: 11.7 FL (ref 9.2–12.9)
POTASSIUM SERPL-SCNC: 3.8 MMOL/L (ref 3.5–5.1)
PROT UR QL STRIP: ABNORMAL
PROT UR-MCNC: 29 MG/DL
PROT/CREAT UR: 0.64 MG/G{CREAT}
PTH-INTACT SERPL-MCNC: 244.3 PG/ML (ref 9–77)
RBC # BLD AUTO: 3.29 M/UL (ref 4.6–6.2)
RBC #/AREA URNS HPF: 1 /HPF (ref 0–4)
RELATIVE EOSINOPHIL (OHS): 4.8 %
RELATIVE LYMPHOCYTE (OHS): 47.1 % (ref 18–48)
RELATIVE MONOCYTE (OHS): 7.6 % (ref 4–15)
RELATIVE NEUTROPHIL (OHS): 40 % (ref 38–73)
SODIUM SERPL-SCNC: 134 MMOL/L (ref 136–145)
SP GR UR STRIP: 1.01
URATE SERPL-MCNC: 11.9 MG/DL (ref 3.4–7)
UROBILINOGEN UR STRIP-ACNC: NEGATIVE EU/DL
WBC # BLD AUTO: 8.71 K/UL (ref 3.9–12.7)
WBC #/AREA URNS HPF: 11 /HPF (ref 0–5)
WBC CLUMPS URNS QL MICRO: ABNORMAL

## 2025-04-11 PROCEDURE — 81003 URINALYSIS AUTO W/O SCOPE: CPT | Mod: PN

## 2025-04-11 PROCEDURE — 84156 ASSAY OF PROTEIN URINE: CPT | Mod: PN

## 2025-04-11 PROCEDURE — 83735 ASSAY OF MAGNESIUM: CPT | Mod: PN

## 2025-04-11 PROCEDURE — 82040 ASSAY OF SERUM ALBUMIN: CPT | Mod: PN

## 2025-04-11 PROCEDURE — 36415 COLL VENOUS BLD VENIPUNCTURE: CPT | Mod: PN

## 2025-04-11 PROCEDURE — 82043 UR ALBUMIN QUANTITATIVE: CPT | Mod: PN

## 2025-04-11 PROCEDURE — 83970 ASSAY OF PARATHORMONE: CPT | Mod: PN

## 2025-04-11 PROCEDURE — 85025 COMPLETE CBC W/AUTO DIFF WBC: CPT | Mod: PN

## 2025-04-11 PROCEDURE — 84550 ASSAY OF BLOOD/URIC ACID: CPT | Mod: PN

## (undated) DEVICE — SYR KIT  AUTO FILL 12ML

## (undated) DEVICE — KIT STANDARD PROCEDURE 4F 55CM

## (undated) DEVICE — Device

## (undated) DEVICE — KIT MICRO INTRODUCER 4F .018IN

## (undated) DEVICE — SYR 3 RING ANGIOGRAPHIC

## (undated) DEVICE — SET MICRO PUNCT 4FR/MPIS-401

## (undated) DEVICE — PACK COMPREHENSIVE PROCEDURE

## (undated) DEVICE — COVER BAND BAG 40 X 40

## (undated) DEVICE — COVER PROBE US 5.5X58L NON LTX